# Patient Record
Sex: FEMALE | Race: WHITE | Employment: OTHER | URBAN - NONMETROPOLITAN AREA
[De-identification: names, ages, dates, MRNs, and addresses within clinical notes are randomized per-mention and may not be internally consistent; named-entity substitution may affect disease eponyms.]

---

## 2017-01-16 DIAGNOSIS — E78.5 HYPERLIPIDEMIA, UNSPECIFIED HYPERLIPIDEMIA TYPE: ICD-10-CM

## 2017-01-16 DIAGNOSIS — I11.0 HYPERTENSIVE HEART DISEASE WITH HEART FAILURE (HCC): ICD-10-CM

## 2017-01-16 DIAGNOSIS — I15.0 RENOVASCULAR HYPERTENSION: ICD-10-CM

## 2017-01-16 DIAGNOSIS — I10 ESSENTIAL HYPERTENSION: Primary | ICD-10-CM

## 2017-01-17 ENCOUNTER — TELEPHONE (OUTPATIENT)
Dept: NEPHROLOGY | Age: 82
End: 2017-01-17

## 2017-01-17 RX ORDER — LISINOPRIL 5 MG/1
2.5 TABLET ORAL DAILY
Qty: 30 TABLET | Refills: 1 | Status: SHIPPED | OUTPATIENT
Start: 2017-01-17 | End: 2018-06-25 | Stop reason: ALTCHOICE

## 2017-01-19 PROBLEM — E86.0 SEVERE DEHYDRATION: Status: ACTIVE | Noted: 2017-01-19

## 2017-01-19 PROBLEM — K59.01 CONSTIPATION BY DELAYED COLONIC TRANSIT: Status: ACTIVE | Noted: 2017-01-19

## 2017-01-19 PROBLEM — E87.3 METABOLIC ALKALOSIS: Status: ACTIVE | Noted: 2017-01-19

## 2017-01-19 PROBLEM — E11.65 DIABETES MELLITUS WITH HYPERGLYCEMIA (HCC): Status: ACTIVE | Noted: 2017-01-19

## 2017-01-24 ENCOUNTER — OFFICE VISIT (OUTPATIENT)
Dept: NEPHROLOGY | Age: 82
End: 2017-01-24

## 2017-01-24 VITALS — HEART RATE: 80 BPM | SYSTOLIC BLOOD PRESSURE: 152 MMHG | RESPIRATION RATE: 16 BRPM | DIASTOLIC BLOOD PRESSURE: 74 MMHG

## 2017-01-24 DIAGNOSIS — I10 ESSENTIAL HYPERTENSION: ICD-10-CM

## 2017-01-24 DIAGNOSIS — N18.30 CKD (CHRONIC KIDNEY DISEASE) STAGE 3, GFR 30-59 ML/MIN (HCC): Primary | ICD-10-CM

## 2017-01-24 DIAGNOSIS — E11.22 TYPE 2 DIABETES MELLITUS WITH STAGE 3 CHRONIC KIDNEY DISEASE, UNSPECIFIED LONG TERM INSULIN USE STATUS: ICD-10-CM

## 2017-01-24 DIAGNOSIS — M15.9 PRIMARY OSTEOARTHRITIS INVOLVING MULTIPLE JOINTS: ICD-10-CM

## 2017-01-24 DIAGNOSIS — N18.3 TYPE 2 DIABETES MELLITUS WITH STAGE 3 CHRONIC KIDNEY DISEASE, UNSPECIFIED LONG TERM INSULIN USE STATUS: ICD-10-CM

## 2017-01-24 PROCEDURE — G8400 PT W/DXA NO RESULTS DOC: HCPCS | Performed by: INTERNAL MEDICINE

## 2017-01-24 PROCEDURE — 1090F PRES/ABSN URINE INCON ASSESS: CPT | Performed by: INTERNAL MEDICINE

## 2017-01-24 PROCEDURE — 1123F ACP DISCUSS/DSCN MKR DOCD: CPT | Performed by: INTERNAL MEDICINE

## 2017-01-24 PROCEDURE — 99213 OFFICE O/P EST LOW 20 MIN: CPT | Performed by: INTERNAL MEDICINE

## 2017-01-24 PROCEDURE — 4040F PNEUMOC VAC/ADMIN/RCVD: CPT | Performed by: INTERNAL MEDICINE

## 2017-01-24 PROCEDURE — G8427 DOCREV CUR MEDS BY ELIG CLIN: HCPCS | Performed by: INTERNAL MEDICINE

## 2017-01-24 PROCEDURE — G8598 ASA/ANTIPLAT THER USED: HCPCS | Performed by: INTERNAL MEDICINE

## 2017-01-24 PROCEDURE — 1111F DSCHRG MED/CURRENT MED MERGE: CPT | Performed by: INTERNAL MEDICINE

## 2017-01-24 PROCEDURE — G8482 FLU IMMUNIZE ORDER/ADMIN: HCPCS | Performed by: INTERNAL MEDICINE

## 2017-01-24 PROCEDURE — G8419 CALC BMI OUT NRM PARAM NOF/U: HCPCS | Performed by: INTERNAL MEDICINE

## 2017-01-24 PROCEDURE — 1036F TOBACCO NON-USER: CPT | Performed by: INTERNAL MEDICINE

## 2017-02-01 ENCOUNTER — TELEPHONE (OUTPATIENT)
Dept: PULMONOLOGY | Age: 82
End: 2017-02-01

## 2017-02-16 ENCOUNTER — OFFICE VISIT (OUTPATIENT)
Dept: CARDIOLOGY | Age: 82
End: 2017-02-16

## 2017-02-16 VITALS
SYSTOLIC BLOOD PRESSURE: 164 MMHG | BODY MASS INDEX: 32.61 KG/M2 | DIASTOLIC BLOOD PRESSURE: 78 MMHG | HEART RATE: 72 BPM | HEIGHT: 64 IN | WEIGHT: 191 LBS

## 2017-02-16 DIAGNOSIS — I10 UNCONTROLLED HYPERTENSION: Primary | ICD-10-CM

## 2017-02-16 DIAGNOSIS — Z95.5 PRESENCE OF STENT IN RIGHT CORONARY ARTERY: ICD-10-CM

## 2017-02-16 DIAGNOSIS — I10 ESSENTIAL HYPERTENSION: ICD-10-CM

## 2017-02-16 DIAGNOSIS — I11.0 HYPERTENSIVE HEART DISEASE WITH HEART FAILURE (HCC): ICD-10-CM

## 2017-02-16 DIAGNOSIS — Z95.5 PRESENCE OF STENT IN LEFT CIRCUMFLEX CORONARY ARTERY: ICD-10-CM

## 2017-02-16 DIAGNOSIS — E78.5 DYSLIPIDEMIA: ICD-10-CM

## 2017-02-16 DIAGNOSIS — I25.10 CORONARY ARTERY DISEASE INVOLVING NATIVE CORONARY ARTERY OF NATIVE HEART WITHOUT ANGINA PECTORIS: ICD-10-CM

## 2017-02-16 DIAGNOSIS — N18.30 CKD (CHRONIC KIDNEY DISEASE) STAGE 3, GFR 30-59 ML/MIN (HCC): ICD-10-CM

## 2017-02-16 PROCEDURE — 4040F PNEUMOC VAC/ADMIN/RCVD: CPT | Performed by: INTERNAL MEDICINE

## 2017-02-16 PROCEDURE — 99213 OFFICE O/P EST LOW 20 MIN: CPT | Performed by: INTERNAL MEDICINE

## 2017-02-16 PROCEDURE — 1090F PRES/ABSN URINE INCON ASSESS: CPT | Performed by: INTERNAL MEDICINE

## 2017-02-16 PROCEDURE — G8427 DOCREV CUR MEDS BY ELIG CLIN: HCPCS | Performed by: INTERNAL MEDICINE

## 2017-02-16 PROCEDURE — G8400 PT W/DXA NO RESULTS DOC: HCPCS | Performed by: INTERNAL MEDICINE

## 2017-02-16 PROCEDURE — 1036F TOBACCO NON-USER: CPT | Performed by: INTERNAL MEDICINE

## 2017-02-16 PROCEDURE — G8598 ASA/ANTIPLAT THER USED: HCPCS | Performed by: INTERNAL MEDICINE

## 2017-02-16 PROCEDURE — 1123F ACP DISCUSS/DSCN MKR DOCD: CPT | Performed by: INTERNAL MEDICINE

## 2017-02-16 PROCEDURE — G8482 FLU IMMUNIZE ORDER/ADMIN: HCPCS | Performed by: INTERNAL MEDICINE

## 2017-02-16 PROCEDURE — G8419 CALC BMI OUT NRM PARAM NOF/U: HCPCS | Performed by: INTERNAL MEDICINE

## 2017-02-16 RX ORDER — ISOSORBIDE MONONITRATE 60 MG/1
60 TABLET, EXTENDED RELEASE ORAL DAILY
Qty: 30 TABLET | Refills: 3 | Status: SHIPPED | OUTPATIENT
Start: 2017-02-16 | End: 2017-06-12 | Stop reason: ALTCHOICE

## 2017-02-16 RX ORDER — METOPROLOL SUCCINATE 100 MG/1
100 TABLET, EXTENDED RELEASE ORAL DAILY
Qty: 30 TABLET | Refills: 3 | Status: SHIPPED | OUTPATIENT
Start: 2017-02-16 | End: 2017-12-18 | Stop reason: DRUGHIGH

## 2017-02-16 RX ORDER — FUROSEMIDE 20 MG/1
20 TABLET ORAL EVERY OTHER DAY
COMMUNITY
End: 2018-06-19 | Stop reason: SDUPTHER

## 2017-02-16 RX ORDER — ISOSORBIDE MONONITRATE 30 MG/1
30 TABLET, EXTENDED RELEASE ORAL 2 TIMES DAILY
COMMUNITY
End: 2017-02-16

## 2017-02-16 RX ORDER — OXYBUTYNIN CHLORIDE 5 MG/1
5 TABLET ORAL 2 TIMES DAILY
COMMUNITY

## 2017-02-17 ENCOUNTER — TELEPHONE (OUTPATIENT)
Dept: NEPHROLOGY | Age: 82
End: 2017-02-17

## 2017-02-28 ENCOUNTER — TELEPHONE (OUTPATIENT)
Dept: NEPHROLOGY | Age: 82
End: 2017-02-28

## 2017-03-01 ENCOUNTER — TELEPHONE (OUTPATIENT)
Dept: CARDIOLOGY | Age: 82
End: 2017-03-01

## 2017-05-23 LAB
ANION GAP SERPL CALCULATED.3IONS-SCNC: 10 MMOL/L
BUN BLDV-MCNC: 56 MG/DL (ref 9–24)
CALCIUM SERPL-MCNC: 9.1 MG/DL (ref 8.7–10.8)
CHLORIDE BLD-SCNC: 108 MMOL/L (ref 95–111)
CO2: 24 MMOL/L (ref 21–32)
CREAT SERPL-MCNC: 1.9 MG/DL (ref 0.5–1.3)
EGFR AFRICAN AMERICAN: 30
EGFR IF NONAFRICAN AMERICAN: 25
GLUCOSE: 159 MG/DL (ref 70–100)
HCT VFR BLD CALC: 29.6 % (ref 36–48)
HCT, POC: 29.6
HEMOGLOBIN: 9.9 G/DL (ref 12–16)
HGB, POC: 9.9
PHOSPHORUS: 3.6 MG/DL (ref 2.5–4.7)
POTASSIUM SERPL-SCNC: 4.3 MMOL/L (ref 3.5–5.4)
PTH INTACT: 64
SODIUM BLD-SCNC: 138 MMOL/L (ref 134–147)
VITAMIN D 25-HYDROXY: 34
VITAMIN D2, 25 HYDROXY: NORMAL
VITAMIN D3,25 HYDROXY: NORMAL

## 2017-05-24 LAB
PARATHYROID HORMONE INTACT: 64 PG/ML (ref 11–67)
VITAMIN D 25-HYDROXY: 34 NG/ML

## 2017-06-12 ENCOUNTER — OFFICE VISIT (OUTPATIENT)
Dept: NEPHROLOGY | Age: 82
End: 2017-06-12

## 2017-06-12 VITALS
SYSTOLIC BLOOD PRESSURE: 160 MMHG | DIASTOLIC BLOOD PRESSURE: 72 MMHG | RESPIRATION RATE: 20 BRPM | BODY MASS INDEX: 32.61 KG/M2 | HEART RATE: 60 BPM | WEIGHT: 190 LBS

## 2017-06-12 DIAGNOSIS — N18.4 DIABETES MELLITUS DUE TO UNDERLYING CONDITION WITH STAGE 4 CHRONIC KIDNEY DISEASE, UNSPECIFIED LONG TERM INSULIN USE STATUS: ICD-10-CM

## 2017-06-12 DIAGNOSIS — I10 ESSENTIAL HYPERTENSION: ICD-10-CM

## 2017-06-12 DIAGNOSIS — M15.9 PRIMARY OSTEOARTHRITIS INVOLVING MULTIPLE JOINTS: ICD-10-CM

## 2017-06-12 DIAGNOSIS — E08.22 DIABETES MELLITUS DUE TO UNDERLYING CONDITION WITH STAGE 4 CHRONIC KIDNEY DISEASE, UNSPECIFIED LONG TERM INSULIN USE STATUS: ICD-10-CM

## 2017-06-12 DIAGNOSIS — N18.4 CKD (CHRONIC KIDNEY DISEASE), STAGE 4 (SEVERE): Primary | ICD-10-CM

## 2017-06-12 DIAGNOSIS — N18.4 CHRONIC KIDNEY DISEASE (CKD), STAGE IV (SEVERE) (HCC): ICD-10-CM

## 2017-06-12 PROCEDURE — G8598 ASA/ANTIPLAT THER USED: HCPCS | Performed by: INTERNAL MEDICINE

## 2017-06-12 PROCEDURE — 1090F PRES/ABSN URINE INCON ASSESS: CPT | Performed by: INTERNAL MEDICINE

## 2017-06-12 PROCEDURE — 99214 OFFICE O/P EST MOD 30 MIN: CPT | Performed by: INTERNAL MEDICINE

## 2017-06-12 PROCEDURE — 1123F ACP DISCUSS/DSCN MKR DOCD: CPT | Performed by: INTERNAL MEDICINE

## 2017-06-12 PROCEDURE — 1036F TOBACCO NON-USER: CPT | Performed by: INTERNAL MEDICINE

## 2017-06-12 PROCEDURE — G8427 DOCREV CUR MEDS BY ELIG CLIN: HCPCS | Performed by: INTERNAL MEDICINE

## 2017-06-12 PROCEDURE — 4040F PNEUMOC VAC/ADMIN/RCVD: CPT | Performed by: INTERNAL MEDICINE

## 2017-06-12 PROCEDURE — G8417 CALC BMI ABV UP PARAM F/U: HCPCS | Performed by: INTERNAL MEDICINE

## 2017-06-12 RX ORDER — LOSARTAN POTASSIUM 100 MG/1
100 TABLET ORAL DAILY
Qty: 90 TABLET | Refills: 3 | Status: SHIPPED | OUTPATIENT
Start: 2017-06-12 | End: 2018-07-09 | Stop reason: SDUPTHER

## 2017-12-15 LAB
ANION GAP SERPL CALCULATED.3IONS-SCNC: 15 MMOL/L
BUN BLDV-MCNC: 64 MG/DL (ref 9–24)
CALCIUM SERPL-MCNC: 9.4 MG/DL (ref 8.7–10.8)
CHLORIDE BLD-SCNC: 103 MMOL/L (ref 95–111)
CO2: 27 MMOL/L (ref 21–32)
CREAT SERPL-MCNC: 2.5 MG/DL (ref 0.5–1.3)
EGFR AFRICAN AMERICAN: 22
EGFR IF NONAFRICAN AMERICAN: 18
GLUCOSE: 113 MG/DL (ref 70–100)
PHOSPHORUS: 4.1 MG/DL (ref 2.5–4.7)
POTASSIUM SERPL-SCNC: 4.4 MMOL/L (ref 3.5–5.4)
SODIUM BLD-SCNC: 141 MMOL/L (ref 134–147)

## 2017-12-18 ENCOUNTER — OFFICE VISIT (OUTPATIENT)
Dept: NEPHROLOGY | Age: 82
End: 2017-12-18
Payer: MEDICARE

## 2017-12-18 VITALS — WEIGHT: 194 LBS | BODY MASS INDEX: 33.3 KG/M2 | DIASTOLIC BLOOD PRESSURE: 80 MMHG | SYSTOLIC BLOOD PRESSURE: 176 MMHG

## 2017-12-18 DIAGNOSIS — N18.4 TYPE 2 DIABETES MELLITUS WITH STAGE 4 CHRONIC KIDNEY DISEASE, UNSPECIFIED LONG TERM INSULIN USE STATUS: ICD-10-CM

## 2017-12-18 DIAGNOSIS — D63.8 ANEMIA OF CHRONIC DISEASE: ICD-10-CM

## 2017-12-18 DIAGNOSIS — N18.4 STAGE 4 CHRONIC KIDNEY DISEASE (HCC): Primary | ICD-10-CM

## 2017-12-18 DIAGNOSIS — N17.9 AKI (ACUTE KIDNEY INJURY) (HCC): ICD-10-CM

## 2017-12-18 DIAGNOSIS — E11.22 TYPE 2 DIABETES MELLITUS WITH STAGE 4 CHRONIC KIDNEY DISEASE, UNSPECIFIED LONG TERM INSULIN USE STATUS: ICD-10-CM

## 2017-12-18 DIAGNOSIS — I10 ESSENTIAL HYPERTENSION: ICD-10-CM

## 2017-12-18 LAB
BUN BLDV-MCNC: 64 MG/DL
CALCIUM SERPL-MCNC: 9.4 MG/DL
CHLORIDE BLD-SCNC: 103 MMOL/L
CO2: 27 MMOL/L
CREAT SERPL-MCNC: 2.5 MG/DL
GFR CALCULATED: 18
GLUCOSE BLD-MCNC: 113 MG/DL
PHOSPHORUS: 4.1 MG/DL
POTASSIUM SERPL-SCNC: 4.4 MMOL/L
SODIUM BLD-SCNC: 141 MMOL/L

## 2017-12-18 PROCEDURE — 99214 OFFICE O/P EST MOD 30 MIN: CPT | Performed by: INTERNAL MEDICINE

## 2017-12-18 PROCEDURE — G8427 DOCREV CUR MEDS BY ELIG CLIN: HCPCS | Performed by: INTERNAL MEDICINE

## 2017-12-18 PROCEDURE — 1090F PRES/ABSN URINE INCON ASSESS: CPT | Performed by: INTERNAL MEDICINE

## 2017-12-18 PROCEDURE — 1036F TOBACCO NON-USER: CPT | Performed by: INTERNAL MEDICINE

## 2017-12-18 PROCEDURE — G8417 CALC BMI ABV UP PARAM F/U: HCPCS | Performed by: INTERNAL MEDICINE

## 2017-12-18 PROCEDURE — 1123F ACP DISCUSS/DSCN MKR DOCD: CPT | Performed by: INTERNAL MEDICINE

## 2017-12-18 PROCEDURE — G8598 ASA/ANTIPLAT THER USED: HCPCS | Performed by: INTERNAL MEDICINE

## 2017-12-18 PROCEDURE — G8484 FLU IMMUNIZE NO ADMIN: HCPCS | Performed by: INTERNAL MEDICINE

## 2017-12-18 PROCEDURE — 4040F PNEUMOC VAC/ADMIN/RCVD: CPT | Performed by: INTERNAL MEDICINE

## 2017-12-18 RX ORDER — ISOSORBIDE MONONITRATE 120 MG/1
60 TABLET, EXTENDED RELEASE ORAL DAILY
COMMUNITY
End: 2018-01-01 | Stop reason: SDUPTHER

## 2017-12-18 RX ORDER — METOPROLOL SUCCINATE 50 MG/1
50 TABLET, EXTENDED RELEASE ORAL DAILY
COMMUNITY

## 2017-12-18 NOTE — PROGRESS NOTES
disease involving native coronary artery of native heart without angina pectoris    Acute diastolic congestive heart failure (HCC)    Accelerated hypertension    Pneumonia of both lungs due to infectious organism    Wheezing    Interstitial nephritis, acute    Acidosis    Anemia of chronic kidney failure    Pneumonia of both lower lobes due to infectious organism    Hypervolemia    Constipation by delayed colonic transit    Metabolic alkalosis    Severe dehydration    Diabetes mellitus with hyperglycemia (HCC)    TURNER (acute kidney injury) ([facility] [address]    Anemia of chronic disease       Subjective:   Chief complaint:  Chief Complaint   Patient presents with    Chronic Kidney Disease     Stage 4      HPI:This is a follow up visit for Mrs. Lexis Abdullahi who is here today for return appointment. I see her for chronic kidney disease. She was last seen about 6 months ago. Serum creatinine was 1.9 mg/dL. Today it is 2.5 mg/dL. BUN was 56 mg/dL. Today it is 64 mg/dL. Since last time I saw her, she had been diagnosed with polymyalgia rheumatica. She had been prescribed a prednisone. She was also prescribed methotrexate and folic acid but she has not started those. About 3-4 months ago she was started on the furosemide 20 mg 1-2 tablets as needed for weight gain of 3 pounds or more. Sometimes she had taken up to 3 tablets. She does have  chronic joint ache and joint pain. She uses walker to ambulate around.   She has chronic lower extremity edema    ROS:Constitutional: negative  Eyes: negative  Ears, nose, mouth, throat, and face: negative  Respiratory: negative  Cardiovascular: positive for lower extremity edema  Gastrointestinal: negative  Genitourinary:negative  Integument/breast: negative  Hematologic/lymphatic: negative  Musculoskeletal:positive for arthralgias and stiff joints  Neurological: positive for coordination problems and gait problems  Behavioral/Psych: negative  Endocrine: negative  Allergic/Immunologic: negative  Medications:     Current Outpatient Prescriptions   Medication Sig Dispense Refill    Multiple Vitamins-Minerals (VISION FORMULA PO) Take by mouth daily      losartan (COZAAR) 100 MG tablet Take 1 tablet by mouth daily 90 tablet 3    furosemide (LASIX) 20 MG tablet Take 20 mg by mouth every other day      oxybutynin (DITROPAN) 5 MG tablet Take 5 mg by mouth 2 times daily      famotidine (PEPCID) 20 MG tablet Take 20 mg by mouth daily as needed      lisinopril (PRINIVIL;ZESTRIL) 5 MG tablet Take 0.5 tablets by mouth daily (Patient taking differently: Take 40 mg by mouth daily ) 30 tablet 1    hydrALAZINE (APRESOLINE) 100 MG tablet Take 1 tablet by mouth 3 times daily 90 tablet 3    LUTEIN-ZEAXANTHIN PO Take by mouth      clopidogrel (PLAVIX) 75 MG tablet Take 1 tablet by mouth daily 90 tablet 3    atorvastatin (LIPITOR) 40 MG tablet Take 1 tablet by mouth daily 90 tablet 3    glipiZIDE (GLUCOTROL) 5 MG tablet Take 5 mg by mouth daily      levothyroxine (SYNTHROID) 125 MCG tablet Take 125 mcg by mouth Daily.  aspirin 81 MG tablet Take 81 mg by mouth daily. No current facility-administered medications for this visit.         Lab Results:    CBC:   Lab Results   Component Value Date    WBC 6.3 04/30/2017    HGB 9.9 05/23/2017    HCT 29.6 05/23/2017    MCV 96.0 04/30/2017     04/30/2017     BMP:    Lab Results   Component Value Date     12/18/2017     05/22/2017     04/30/2017    K 4.4 12/18/2017    K 4.3 05/22/2017    K 4.7 04/30/2017     12/18/2017     05/22/2017     04/30/2017    CO2 27 12/18/2017    CO2 24 05/22/2017    CO2 23 04/30/2017    BUN 64 12/18/2017    BUN 56 (H) 05/22/2017    BUN 64 (H) 04/30/2017    CREATININE 2.5 12/18/2017    CREATININE 1.9 (H) 05/22/2017    CREATININE 2.3 (H) 04/30/2017    GLUCOSE 113 12/18/2017    GLUCOSE 159 (H) 05/22/2017    GLUCOSE 79 04/30/2017      Hepatic:   Lab Results

## 2017-12-19 LAB
PARATHYROID HORMONE INTACT: 6 PG/ML (ref 11–67)
VITAMIN D 25-HYDROXY: 20 NG/ML

## 2017-12-20 ENCOUNTER — TELEPHONE (OUTPATIENT)
Dept: NEPHROLOGY | Age: 82
End: 2017-12-20

## 2018-01-01 ENCOUNTER — HOSPITAL ENCOUNTER (OUTPATIENT)
Age: 83
Discharge: HOME | End: 2018-12-10
Payer: MEDICARE

## 2018-01-01 ENCOUNTER — HOSPITAL ENCOUNTER (OUTPATIENT)
Dept: GENERAL RADIOLOGY | Age: 83
Discharge: HOME | End: 2018-12-10
Payer: MEDICARE

## 2018-01-01 ENCOUNTER — OFFICE VISIT (OUTPATIENT)
Dept: NEPHROLOGY | Age: 83
End: 2018-01-01
Payer: MEDICARE

## 2018-01-01 ENCOUNTER — TELEPHONE (OUTPATIENT)
Dept: ADMINISTRATIVE | Age: 83
End: 2018-01-01

## 2018-01-01 ENCOUNTER — HOSPITAL ENCOUNTER (OUTPATIENT)
Dept: NURSING | Age: 83
Discharge: HOME | End: 2018-11-26
Payer: MEDICARE

## 2018-01-01 VITALS
OXYGEN SATURATION: 96 % | BODY MASS INDEX: 34.4 KG/M2 | DIASTOLIC BLOOD PRESSURE: 67 MMHG | HEART RATE: 88 BPM | WEIGHT: 194.2 LBS | SYSTOLIC BLOOD PRESSURE: 147 MMHG

## 2018-01-01 VITALS
OXYGEN SATURATION: 98 % | SYSTOLIC BLOOD PRESSURE: 154 MMHG | TEMPERATURE: 96.9 F | RESPIRATION RATE: 18 BRPM | HEART RATE: 64 BPM | DIASTOLIC BLOOD PRESSURE: 70 MMHG

## 2018-01-01 DIAGNOSIS — M85.80 OSTEOPENIA, UNSPECIFIED LOCATION: ICD-10-CM

## 2018-01-01 DIAGNOSIS — Z87.39 H/O: GOUT: ICD-10-CM

## 2018-01-01 DIAGNOSIS — N18.4 STAGE 4 CHRONIC KIDNEY DISEASE (HCC): Primary | ICD-10-CM

## 2018-01-01 DIAGNOSIS — D63.8 ANEMIA OF CHRONIC DISEASE: ICD-10-CM

## 2018-01-01 DIAGNOSIS — N18.4 TYPE 2 DIABETES MELLITUS WITH STAGE 4 CHRONIC KIDNEY DISEASE, UNSPECIFIED WHETHER LONG TERM INSULIN USE (HCC): ICD-10-CM

## 2018-01-01 DIAGNOSIS — E11.22 TYPE 2 DIABETES MELLITUS WITH STAGE 4 CHRONIC KIDNEY DISEASE, UNSPECIFIED WHETHER LONG TERM INSULIN USE (HCC): ICD-10-CM

## 2018-01-01 DIAGNOSIS — M25.50 POLYARTHRALGIA: ICD-10-CM

## 2018-01-01 DIAGNOSIS — E55.9 VITAMIN D DEFICIENCY: ICD-10-CM

## 2018-01-01 DIAGNOSIS — I10 ESSENTIAL HYPERTENSION: ICD-10-CM

## 2018-01-01 LAB
ANION GAP SERPL CALCULATED.3IONS-SCNC: 13 MEQ/L (ref 10–19)
BUN BLDV-MCNC: 74 MG/DL (ref 8–23)
CALCIUM SERPL-MCNC: 9 MG/DL (ref 8.5–10.5)
CHLORIDE BLD-SCNC: 104 MEQ/L (ref 95–107)
CO2: 25 MEQ/L (ref 19–31)
CREAT SERPL-MCNC: 2.5 MG/DL (ref 0.6–1.3)
EGFR AFRICAN AMERICAN: 19.4 ML/MIN/1.73 M2
EGFR IF NONAFRICAN AMERICAN: 16.7 ML/MIN/1.73 M2
GLUCOSE: 175 MG/DL (ref 70–99)
PARATHYROID HORMONE INTACT: 190 PG/ML (ref 11–67)
PHOSPHORUS: 3 MG/DL (ref 2.5–4.5)
POTASSIUM SERPL-SCNC: 4.9 MEQ/L (ref 3.5–5.4)
SODIUM BLD-SCNC: 142 MEQ/L (ref 135–146)
VITAMIN D 25-HYDROXY: 50 NG/ML

## 2018-01-01 PROCEDURE — 4040F PNEUMOC VAC/ADMIN/RCVD: CPT | Performed by: INTERNAL MEDICINE

## 2018-01-01 PROCEDURE — G8417 CALC BMI ABV UP PARAM F/U: HCPCS | Performed by: INTERNAL MEDICINE

## 2018-01-01 PROCEDURE — 1123F ACP DISCUSS/DSCN MKR DOCD: CPT | Performed by: INTERNAL MEDICINE

## 2018-01-01 PROCEDURE — 1090F PRES/ABSN URINE INCON ASSESS: CPT | Performed by: INTERNAL MEDICINE

## 2018-01-01 PROCEDURE — 1036F TOBACCO NON-USER: CPT | Performed by: INTERNAL MEDICINE

## 2018-01-01 PROCEDURE — 99213 OFFICE O/P EST LOW 20 MIN: CPT | Performed by: INTERNAL MEDICINE

## 2018-01-01 PROCEDURE — G8598 ASA/ANTIPLAT THER USED: HCPCS | Performed by: INTERNAL MEDICINE

## 2018-01-01 PROCEDURE — 6360000002 HC RX W HCPCS: Performed by: INTERNAL MEDICINE

## 2018-01-01 PROCEDURE — 96372 THER/PROPH/DIAG INJ SC/IM: CPT

## 2018-01-01 PROCEDURE — G8484 FLU IMMUNIZE NO ADMIN: HCPCS | Performed by: INTERNAL MEDICINE

## 2018-01-01 PROCEDURE — G8427 DOCREV CUR MEDS BY ELIG CLIN: HCPCS | Performed by: INTERNAL MEDICINE

## 2018-01-01 PROCEDURE — 73630 X-RAY EXAM OF FOOT: CPT

## 2018-01-01 PROCEDURE — 1101F PT FALLS ASSESS-DOCD LE1/YR: CPT | Performed by: INTERNAL MEDICINE

## 2018-01-01 RX ORDER — DIPHENHYDRAMINE HYDROCHLORIDE 50 MG/ML
50 INJECTION INTRAMUSCULAR; INTRAVENOUS ONCE
Status: CANCELLED | OUTPATIENT
Start: 2018-01-01 | End: 2018-01-01

## 2018-01-01 RX ORDER — SODIUM CHLORIDE 9 MG/ML
100 INJECTION, SOLUTION INTRAVENOUS CONTINUOUS
Status: CANCELLED | OUTPATIENT
Start: 2018-01-01

## 2018-01-01 RX ORDER — 0.9 % SODIUM CHLORIDE 0.9 %
10 VIAL (ML) INJECTION ONCE
Status: CANCELLED | OUTPATIENT
Start: 2018-01-01 | End: 2018-01-01

## 2018-01-01 RX ORDER — METHYLPREDNISOLONE SODIUM SUCCINATE 125 MG/2ML
125 INJECTION, POWDER, LYOPHILIZED, FOR SOLUTION INTRAMUSCULAR; INTRAVENOUS ONCE
Status: CANCELLED | OUTPATIENT
Start: 2018-01-01 | End: 2018-01-01

## 2018-01-01 RX ORDER — ISOSORBIDE MONONITRATE 60 MG/1
60 TABLET, EXTENDED RELEASE ORAL DAILY
COMMUNITY

## 2018-01-01 RX ADMIN — DENOSUMAB 60 MG: 60 INJECTION SUBCUTANEOUS at 14:44

## 2018-02-27 ENCOUNTER — HOSPITAL ENCOUNTER (OUTPATIENT)
Dept: GENERAL RADIOLOGY | Age: 83
Discharge: HOME | End: 2018-02-27
Payer: MEDICARE

## 2018-02-27 ENCOUNTER — HOSPITAL ENCOUNTER (OUTPATIENT)
Age: 83
Discharge: HOME | End: 2018-02-27
Payer: MEDICARE

## 2018-02-27 ENCOUNTER — OFFICE VISIT (OUTPATIENT)
Dept: RHEUMATOLOGY | Age: 83
End: 2018-02-27
Payer: MEDICARE

## 2018-02-27 VITALS
DIASTOLIC BLOOD PRESSURE: 80 MMHG | HEIGHT: 64 IN | SYSTOLIC BLOOD PRESSURE: 188 MMHG | HEART RATE: 97 BPM | BODY MASS INDEX: 32.27 KG/M2 | RESPIRATION RATE: 14 BRPM | WEIGHT: 189 LBS

## 2018-02-27 DIAGNOSIS — M17.0 OSTEOARTHRITIS OF BOTH KNEES, UNSPECIFIED OSTEOARTHRITIS TYPE: ICD-10-CM

## 2018-02-27 DIAGNOSIS — M19.042 OSTEOARTHRITIS OF BOTH HANDS, UNSPECIFIED OSTEOARTHRITIS TYPE: ICD-10-CM

## 2018-02-27 DIAGNOSIS — M54.41 CHRONIC MIDLINE LOW BACK PAIN WITH BILATERAL SCIATICA: ICD-10-CM

## 2018-02-27 DIAGNOSIS — M25.50 POLYARTHRALGIA: Primary | ICD-10-CM

## 2018-02-27 DIAGNOSIS — M54.2 CERVICALGIA: ICD-10-CM

## 2018-02-27 DIAGNOSIS — R53.83 FATIGUE, UNSPECIFIED TYPE: ICD-10-CM

## 2018-02-27 DIAGNOSIS — G89.29 CHRONIC MIDLINE LOW BACK PAIN WITH BILATERAL SCIATICA: ICD-10-CM

## 2018-02-27 DIAGNOSIS — M25.50 POLYARTHRALGIA: ICD-10-CM

## 2018-02-27 DIAGNOSIS — M19.041 OSTEOARTHRITIS OF BOTH HANDS, UNSPECIFIED OSTEOARTHRITIS TYPE: ICD-10-CM

## 2018-02-27 DIAGNOSIS — M54.42 CHRONIC MIDLINE LOW BACK PAIN WITH BILATERAL SCIATICA: ICD-10-CM

## 2018-02-27 DIAGNOSIS — Z78.0 POST-MENOPAUSAL: ICD-10-CM

## 2018-02-27 LAB
ALBUMIN SERPL-MCNC: 4.4 G/DL
ALP BLD-CCNC: 105 U/L
ALT SERPL-CCNC: 10 U/L
ANION GAP SERPL CALCULATED.3IONS-SCNC: 19 MMOL/L
AST SERPL-CCNC: 17 U/L
BASOPHILS ABSOLUTE: 0.1 /ΜL
BASOPHILS RELATIVE PERCENT: 1.2 %
BILIRUB SERPL-MCNC: 0.6 MG/DL (ref 0.1–1.4)
BUN BLDV-MCNC: 73 MG/DL
C-REACTIVE PROTEIN: 0.26
CALCIUM SERPL-MCNC: 10 MG/DL
CHLORIDE BLD-SCNC: 103 MMOL/L
CO2: 24 MMOL/L
CREAT SERPL-MCNC: 2.3 MG/DL
EOSINOPHILS ABSOLUTE: 0.3 /ΜL
EOSINOPHILS RELATIVE PERCENT: 3.8 %
FERRITIN: 57.01 NG/ML (ref 9–150)
GFR CALCULATED: 18.5
GLUCOSE BLD-MCNC: 126 MG/DL
HCT VFR BLD CALC: 31.4 % (ref 36–46)
HEMOGLOBIN: 10.7 G/DL (ref 12–16)
LYMPHOCYTES ABSOLUTE: 0.6 /ΜL
LYMPHOCYTES RELATIVE PERCENT: 7.8 %
MAGNESIUM: 2 MG/DL
MCH RBC QN AUTO: 32.9 PG
MCHC RBC AUTO-ENTMCNC: 34.1 G/DL
MCV RBC AUTO: 96.6 FL
MONOCYTES ABSOLUTE: 0.8 /ΜL
MONOCYTES RELATIVE PERCENT: 9.5 %
NEUTROPHILS ABSOLUTE: 6.2 /ΜL
NEUTROPHILS RELATIVE PERCENT: 77.2 %
PDW BLD-RTO: 14.4 %
PHOSPHORUS: 4.9 MG/DL
PLATELET # BLD: 227 K/ΜL
PMV BLD AUTO: ABNORMAL FL
POTASSIUM SERPL-SCNC: 4.8 MMOL/L
RBC # BLD: 3.25 10^6/ΜL
RHEUMATOID FACTOR: <10
SEDIMENTATION RATE, ERYTHROCYTE: 45
SODIUM BLD-SCNC: 146 MMOL/L
TOTAL PROTEIN: 6.5
URIC ACID: 10.5
WBC # BLD: 8.1 10^3/ML

## 2018-02-27 PROCEDURE — G8417 CALC BMI ABV UP PARAM F/U: HCPCS | Performed by: INTERNAL MEDICINE

## 2018-02-27 PROCEDURE — 1090F PRES/ABSN URINE INCON ASSESS: CPT | Performed by: INTERNAL MEDICINE

## 2018-02-27 PROCEDURE — 71046 X-RAY EXAM CHEST 2 VIEWS: CPT

## 2018-02-27 PROCEDURE — G8427 DOCREV CUR MEDS BY ELIG CLIN: HCPCS | Performed by: INTERNAL MEDICINE

## 2018-02-27 PROCEDURE — 73130 X-RAY EXAM OF HAND: CPT

## 2018-02-27 PROCEDURE — 99204 OFFICE O/P NEW MOD 45 MIN: CPT | Performed by: INTERNAL MEDICINE

## 2018-02-27 PROCEDURE — 4040F PNEUMOC VAC/ADMIN/RCVD: CPT | Performed by: INTERNAL MEDICINE

## 2018-02-27 PROCEDURE — G8484 FLU IMMUNIZE NO ADMIN: HCPCS | Performed by: INTERNAL MEDICINE

## 2018-02-27 ASSESSMENT — JOINT PAIN
TOTAL NUMBER OF SWOLLEN JOINTS: 10
TOTAL NUMBER OF TENDER JOINTS: 18

## 2018-02-27 ASSESSMENT — ENCOUNTER SYMPTOMS
EYES NEGATIVE: 1
CONSTIPATION: 1
BACK PAIN: 1
DIARRHEA: 1
RESPIRATORY NEGATIVE: 1
BLOOD IN STOOL: 0
ORTHOPNEA: 0

## 2018-02-27 NOTE — PROGRESS NOTES
Codeine #3 [Acetaminophen-Codeine]     Ultram [Tramadol] Other (See Comments)     dizziness    Vioxx [Rofecoxib] Other (See Comments)     dizziness    Advil [Ibuprofen] Nausea And Vomiting and Other (See Comments)     Was told never to take    Bumex [Bumetanide] Nausea And Vomiting    Codeine Other (See Comments)     Inability to focus, glassed over eyes patient states    Darvocet A500 [Propoxyphene N-Acetaminophen] Nausea And Vomiting and Other (See Comments)     dizziness    Darvon [Fd&C Red #40-Fd&C Yellow #10-Propoxyphene] Nausea And Vomiting and Other (See Comments)     dizziness       CURRENT MEDICATIONS  Current Outpatient Prescriptions   Medication Sig Dispense Refill    Cholecalciferol (VITAMIN D3) 5000 units CAPS Take 1 capsule by mouth daily      isosorbide mononitrate (IMDUR) 60 MG extended release tablet Take 60 mg by mouth daily      metoprolol succinate (TOPROL XL) 50 MG extended release tablet Take 50 mg by mouth daily      Multiple Vitamins-Minerals (VISION FORMULA PO) Take by mouth daily      losartan (COZAAR) 100 MG tablet Take 1 tablet by mouth daily 90 tablet 3    furosemide (LASIX) 20 MG tablet Take 20 mg by mouth every other day      oxybutynin (DITROPAN) 5 MG tablet Take 5 mg by mouth 2 times daily      famotidine (PEPCID) 20 MG tablet Take 20 mg by mouth daily as needed      lisinopril (PRINIVIL;ZESTRIL) 5 MG tablet Take 0.5 tablets by mouth daily (Patient taking differently: Take 40 mg by mouth daily ) 30 tablet 1    hydrALAZINE (APRESOLINE) 100 MG tablet Take 1 tablet by mouth 3 times daily 90 tablet 3    LUTEIN-ZEAXANTHIN PO Take by mouth      clopidogrel (PLAVIX) 75 MG tablet Take 1 tablet by mouth daily 90 tablet 3    glipiZIDE (GLUCOTROL) 5 MG tablet Take 5 mg by mouth daily      levothyroxine (SYNTHROID) 125 MCG tablet Take 125 mcg by mouth Daily.  aspirin 81 MG tablet Take 81 mg by mouth daily.        No current facility-administered medications for this and wrist  LUE: wrist  Right hand: 1st MCP, 2nd MCP, 5th MCP, 1st PIP, 2nd PIP, 3rd PIP, 4th PIP, 5th PIP, 2nd DIP, 3rd DIP, 4th DIP and 5th DIP  Left hand: 1st MCP, 2nd MCP, 3rd MCP, 1st PIP, 2nd PIP, 3rd PIP and 5th PIP    Swelling:   RUE: ulnohumeral and radiohumeral and wrist  LUE: wrist  Right hand: 2nd MCP, 5th MCP, 1st PIP, 2nd PIP and 5th PIP  Left hand: 1st PIP and 5th PIP    YOUNG-28 tender joint count: 18  YOUNG-28 swollen joint count: 10    RAPID3 Composite Score  MDHAQ (0-10):   Patient pain VAS (0-10):   Patient global assessment VAS (0-10):      RAPID3 Total Score:   Remission: <3  Low Disease Activity: <6  Moderate Disease Activity: >=6 and <=12  High Disease Activity: >12    LABS:  CBC  Lab Results   Component Value Date    WBC 6.3 04/30/2017    RBC 3.23 04/30/2017    RBC 3.48 01/13/2016    HGB 9.9 05/23/2017    HGB 9.9 05/22/2017    HCT 29.6 05/23/2017    HCT 29.6 05/22/2017    MCV 96.0 04/30/2017    MCH 33.0 04/30/2017    MCHC 34.3 04/30/2017    RDW 12.8 04/30/2017     04/30/2017       CMP  Lab Results   Component Value Date    CALCIUM 9.4 12/18/2017    LABALBU 3.5 04/30/2017    PROT 6.2 04/30/2017     12/18/2017    K 4.4 12/18/2017    CO2 27 12/18/2017     12/18/2017    BUN 64 12/18/2017    CREATININE 2.5 12/18/2017    ALT 7 04/30/2017    AST 18 04/30/2017       HgBA1c: No components found for: HGBA1C    Lab Results   Component Value Date    TSH 3.920 02/24/2016     Lab Results   Component Value Date    VITD25 20 12/14/2017         No results found for: ANASCRN  No results found for: SSA  No results found for: SSB  No results found for: ANTI-SMITH  No results found for: DSDNAAB   No results found for: ANTIRNP  No results found for: C3, C4  No results found for: CCPAB  No results found for: RF    No components found for: CANCASCRN, APANCASCRN  Lab Results   Component Value Date    SEDRATE 42 (H) 12/28/2016     No results found for: CRP    RADIOLOGY:       Assessment/ Plan:    Polyarthralgia:   - The patient reports joint pain in finger, wrist, elbows, Right shoulder, Bilateral hips, toes, neck and back. Most severe in the right shoulder and elbows. Symptoms started: the lower back with sciatica present for years,  No relief with surgery. Neck pain present for years diagnosed with moderate-severe cervical spinal stenosis in 2015. Achilles tendon rupture 2004 with surgical intervention and in 2005 or 06 she had hammer toe surgery on the right foot. ~ [id number]  the hands pain started with intermittent swelling. nodule development on the fingers over the past several years. The pain in the right shoulder, right elbows started over the past 6 months. Unable to straight the right elbow for 10 years and inability to make a fist. Difficulty with open bottle tops and jar tops, Denies difficulty dressing of feeding herself. Morning stiffness lasting 15-30 minutes, (+) joint swelling, dry mouth, itching eyes intermittent, weakness of legs with standing/walking improved with sitting.    - (+) synovitis in hands and Right elbows. [place] and shayla nodes with [facility]. Limited ROM of the right shoulder and right hand. (+) circumferential swelling of the right 5th Finger, right 1st toes, and 5th toe. Red rash along the anterior    -  Mother & sister with reported similar arthritic hands. Rheumatism in both mother and father.    - evaluation for gout, CPPD arthropathy. Pt with rash in the gluteal cleft, and significant deformities of the figners with synovitis and dactylitis noted on exam so ? PsA. Dactylitis and synovitis can be seen with sarcoidosis. Lower suspicion for PMR given the feet involvement. Will also evaluate for rheumatoid arthritis, SLE, Sjgorens (given Sicca Sx's)   - negative bilateral Temporal artery biopsy 3/12/15    - CBC Auto Differential; Future  - Comprehensive Metabolic Panel; Future  - Sedimentation Rate; Future  - C-Reactive Protein;  Future  - Hepatitis

## 2018-03-05 ENCOUNTER — TELEPHONE (OUTPATIENT)
Dept: RHEUMATOLOGY | Age: 83
End: 2018-03-05

## 2018-03-07 ENCOUNTER — HOSPITAL ENCOUNTER (OUTPATIENT)
Dept: WOMENS IMAGING | Age: 83
Discharge: HOME | End: 2018-03-07
Payer: MEDICARE

## 2018-03-07 DIAGNOSIS — Z78.0 POST-MENOPAUSAL: ICD-10-CM

## 2018-03-07 PROCEDURE — 77080 DXA BONE DENSITY AXIAL: CPT

## 2018-03-09 ENCOUNTER — TELEPHONE (OUTPATIENT)
Dept: RHEUMATOLOGY | Age: 83
End: 2018-03-09

## 2018-03-09 NOTE — TELEPHONE ENCOUNTER
Pt called but there was no answer. A message was left on the patients voicemail asking them to call back. Labs:   - pt with elevated uric acid and Erythrocyte Sedimentation Rate (ESR)   - DEXA consistent with osteopenia but FRAX score warranting treatment given risk of hip fracture was 3.8%. The major risk for fracture was 13 %.  Given her CKD would like to puruse Prolia

## 2018-03-17 LAB
ANION GAP SERPL CALCULATED.3IONS-SCNC: 21 MEQ/L (ref 10–19)
BUN BLDV-MCNC: 53 MG/DL (ref 8–23)
CALCIUM SERPL-MCNC: 8.9 MG/DL (ref 8.5–10.5)
CHLORIDE BLD-SCNC: 101 MEQ/L (ref 95–107)
CO2: 24 MEQ/L (ref 19–31)
CREAT SERPL-MCNC: 2 MG/DL (ref 0.6–1.3)
EGFR AFRICAN AMERICAN: 25.4 ML/MIN/1.73 M2
EGFR IF NONAFRICAN AMERICAN: 21.9 ML/MIN/1.73 M2
GLUCOSE: 149 MG/DL (ref 70–99)
PHOSPHORUS: 3.6 MG/DL (ref 2.5–4.5)
POTASSIUM SERPL-SCNC: 4.5 MEQ/L (ref 3.5–5.4)
SODIUM BLD-SCNC: 146 MEQ/L (ref 135–146)

## 2018-03-19 ENCOUNTER — OFFICE VISIT (OUTPATIENT)
Dept: NEPHROLOGY | Age: 83
End: 2018-03-19
Payer: MEDICARE

## 2018-03-19 VITALS
BODY MASS INDEX: 34.31 KG/M2 | RESPIRATION RATE: 18 BRPM | WEIGHT: 200 LBS | SYSTOLIC BLOOD PRESSURE: 158 MMHG | HEART RATE: 80 BPM | DIASTOLIC BLOOD PRESSURE: 64 MMHG

## 2018-03-19 DIAGNOSIS — N18.4 CKD (CHRONIC KIDNEY DISEASE), STAGE IV (HCC): Primary | ICD-10-CM

## 2018-03-19 DIAGNOSIS — I10 ESSENTIAL HYPERTENSION: ICD-10-CM

## 2018-03-19 DIAGNOSIS — D63.8 ANEMIA OF CHRONIC DISEASE: ICD-10-CM

## 2018-03-19 DIAGNOSIS — N18.4 TYPE 2 DIABETES MELLITUS WITH STAGE 4 CHRONIC KIDNEY DISEASE, UNSPECIFIED LONG TERM INSULIN USE STATUS: ICD-10-CM

## 2018-03-19 DIAGNOSIS — E11.22 TYPE 2 DIABETES MELLITUS WITH STAGE 4 CHRONIC KIDNEY DISEASE, UNSPECIFIED LONG TERM INSULIN USE STATUS: ICD-10-CM

## 2018-03-19 PROCEDURE — G8417 CALC BMI ABV UP PARAM F/U: HCPCS | Performed by: INTERNAL MEDICINE

## 2018-03-19 PROCEDURE — 4040F PNEUMOC VAC/ADMIN/RCVD: CPT | Performed by: INTERNAL MEDICINE

## 2018-03-19 PROCEDURE — 1090F PRES/ABSN URINE INCON ASSESS: CPT | Performed by: INTERNAL MEDICINE

## 2018-03-19 PROCEDURE — G8484 FLU IMMUNIZE NO ADMIN: HCPCS | Performed by: INTERNAL MEDICINE

## 2018-03-19 PROCEDURE — 99214 OFFICE O/P EST MOD 30 MIN: CPT | Performed by: INTERNAL MEDICINE

## 2018-03-19 PROCEDURE — 1036F TOBACCO NON-USER: CPT | Performed by: INTERNAL MEDICINE

## 2018-03-19 PROCEDURE — G8427 DOCREV CUR MEDS BY ELIG CLIN: HCPCS | Performed by: INTERNAL MEDICINE

## 2018-03-19 PROCEDURE — 1123F ACP DISCUSS/DSCN MKR DOCD: CPT | Performed by: INTERNAL MEDICINE

## 2018-03-19 PROCEDURE — G8598 ASA/ANTIPLAT THER USED: HCPCS | Performed by: INTERNAL MEDICINE

## 2018-03-19 NOTE — PROGRESS NOTES
negative  Allergic/Immunologic: negative     Medications:     Current Outpatient Prescriptions   Medication Sig Dispense Refill    Cholecalciferol (VITAMIN D3) 5000 units CAPS Take 1 capsule by mouth daily      isosorbide mononitrate (IMDUR) 60 MG extended release tablet Take 60 mg by mouth daily      metoprolol succinate (TOPROL XL) 50 MG extended release tablet Take 50 mg by mouth daily      losartan (COZAAR) 100 MG tablet Take 1 tablet by mouth daily 90 tablet 3    furosemide (LASIX) 20 MG tablet Take 20 mg by mouth every other day      oxybutynin (DITROPAN) 5 MG tablet Take 5 mg by mouth 2 times daily      hydrALAZINE (APRESOLINE) 100 MG tablet Take 1 tablet by mouth 3 times daily 90 tablet 3    LUTEIN-ZEAXANTHIN PO Take by mouth      clopidogrel (PLAVIX) 75 MG tablet Take 1 tablet by mouth daily 90 tablet 3    glipiZIDE (GLUCOTROL) 5 MG tablet Take 5 mg by mouth daily      Levothyroxine Sodium 150 MCG CAPS Take 150 mcg by mouth Daily       aspirin 81 MG tablet Take 81 mg by mouth daily.  Multiple Vitamins-Minerals (VISION FORMULA PO) Take by mouth daily      famotidine (PEPCID) 20 MG tablet Take 20 mg by mouth daily as needed      lisinopril (PRINIVIL;ZESTRIL) 5 MG tablet Take 0.5 tablets by mouth daily (Patient taking differently: Take 40 mg by mouth daily ) 30 tablet 1     No current facility-administered medications for this visit.         Lab Results:    CBC:   Lab Results   Component Value Date    WBC 8.1 02/27/2018    HGB 10.7 (A) 02/27/2018    HCT 31.4 (A) 02/27/2018    MCV 96.6 02/27/2018     02/27/2018     BMP:    Lab Results   Component Value Date     02/27/2018     12/18/2017     12/14/2017    K 4.8 02/27/2018    K 4.4 12/18/2017    K 4.4 12/14/2017     02/27/2018     12/18/2017     12/14/2017    CO2 24 02/27/2018    CO2 27 12/18/2017    CO2 27 12/14/2017    BUN 73 02/27/2018    BUN 64 12/18/2017    BUN 64 (H) 12/14/2017    CREATININE 2.3

## 2018-03-20 LAB — PARATHYROID HORMONE INTACT: 92 PG/ML (ref 11–67)

## 2018-04-10 ENCOUNTER — OFFICE VISIT (OUTPATIENT)
Dept: RHEUMATOLOGY | Age: 83
End: 2018-04-10
Payer: MEDICARE

## 2018-04-10 ENCOUNTER — TELEPHONE (OUTPATIENT)
Dept: NEPHROLOGY | Age: 83
End: 2018-04-10

## 2018-04-10 VITALS
OXYGEN SATURATION: 96 % | SYSTOLIC BLOOD PRESSURE: 134 MMHG | DIASTOLIC BLOOD PRESSURE: 61 MMHG | BODY MASS INDEX: 33.83 KG/M2 | HEART RATE: 72 BPM | WEIGHT: 197.2 LBS

## 2018-04-10 DIAGNOSIS — M85.89 OSTEOPENIA OF MULTIPLE SITES: ICD-10-CM

## 2018-04-10 DIAGNOSIS — M19.042 OSTEOARTHRITIS OF BOTH HANDS, UNSPECIFIED OSTEOARTHRITIS TYPE: ICD-10-CM

## 2018-04-10 DIAGNOSIS — M19.041 OSTEOARTHRITIS OF BOTH HANDS, UNSPECIFIED OSTEOARTHRITIS TYPE: ICD-10-CM

## 2018-04-10 DIAGNOSIS — M1A.09X1 IDIOPATHIC CHRONIC GOUT OF MULTIPLE SITES WITH TOPHUS: Primary | ICD-10-CM

## 2018-04-10 DIAGNOSIS — M54.2 CERVICALGIA: ICD-10-CM

## 2018-04-10 DIAGNOSIS — M48.02 CERVICAL SPINAL STENOSIS: ICD-10-CM

## 2018-04-10 DIAGNOSIS — Z51.81 MEDICATION MONITORING ENCOUNTER: ICD-10-CM

## 2018-04-10 PROCEDURE — 1036F TOBACCO NON-USER: CPT | Performed by: INTERNAL MEDICINE

## 2018-04-10 PROCEDURE — 99214 OFFICE O/P EST MOD 30 MIN: CPT | Performed by: INTERNAL MEDICINE

## 2018-04-10 PROCEDURE — G8427 DOCREV CUR MEDS BY ELIG CLIN: HCPCS | Performed by: INTERNAL MEDICINE

## 2018-04-10 PROCEDURE — 4040F PNEUMOC VAC/ADMIN/RCVD: CPT | Performed by: INTERNAL MEDICINE

## 2018-04-10 PROCEDURE — 1090F PRES/ABSN URINE INCON ASSESS: CPT | Performed by: INTERNAL MEDICINE

## 2018-04-10 PROCEDURE — G8598 ASA/ANTIPLAT THER USED: HCPCS | Performed by: INTERNAL MEDICINE

## 2018-04-10 PROCEDURE — G8417 CALC BMI ABV UP PARAM F/U: HCPCS | Performed by: INTERNAL MEDICINE

## 2018-04-10 PROCEDURE — 1123F ACP DISCUSS/DSCN MKR DOCD: CPT | Performed by: INTERNAL MEDICINE

## 2018-04-10 RX ORDER — ALLOPURINOL 100 MG/1
100 TABLET ORAL DAILY
Qty: 30 TABLET | Refills: 0 | Status: SHIPPED | OUTPATIENT
Start: 2018-04-10 | End: 2018-04-30 | Stop reason: SDUPTHER

## 2018-04-10 ASSESSMENT — ENCOUNTER SYMPTOMS
RESPIRATORY NEGATIVE: 1
DIARRHEA: 1
EYES NEGATIVE: 1
BLOOD IN STOOL: 0
CONSTIPATION: 1
ORTHOPNEA: 0
BACK PAIN: 1

## 2018-04-27 LAB
ALBUMIN SERPL-MCNC: 4 G/DL
ALP BLD-CCNC: 104 U/L
ALT SERPL-CCNC: 8 U/L
ANION GAP SERPL CALCULATED.3IONS-SCNC: 14 MMOL/L
AST SERPL-CCNC: 16 U/L
BILIRUB SERPL-MCNC: 0.4 MG/DL (ref 0.1–1.4)
BUN BLDV-MCNC: 52 MG/DL
CALCIUM SERPL-MCNC: 9.5 MG/DL
CHLORIDE BLD-SCNC: 101 MMOL/L
CO2: 27 MMOL/L
CREAT SERPL-MCNC: 1.9 MG/DL
GFR CALCULATED: 23.3
GLUCOSE BLD-MCNC: 94 MG/DL
POTASSIUM SERPL-SCNC: 4.6 MMOL/L
SODIUM BLD-SCNC: 142 MMOL/L
TOTAL PROTEIN: 6.1
URIC ACID: 7.6

## 2018-04-30 DIAGNOSIS — M1A.09X1 IDIOPATHIC CHRONIC GOUT OF MULTIPLE SITES WITH TOPHUS: Primary | ICD-10-CM

## 2018-04-30 RX ORDER — ALLOPURINOL 100 MG/1
200 TABLET ORAL DAILY
Qty: 60 TABLET | Refills: 0 | Status: SHIPPED | OUTPATIENT
Start: 2018-04-30 | End: 2018-05-14 | Stop reason: SDUPTHER

## 2018-05-01 ENCOUNTER — TELEPHONE (OUTPATIENT)
Dept: RHEUMATOLOGY | Age: 83
End: 2018-05-01

## 2018-05-11 LAB
ALBUMIN SERPL-MCNC: 3.8 G/DL
ALP BLD-CCNC: 104 U/L
ALT SERPL-CCNC: 7 U/L
ANION GAP SERPL CALCULATED.3IONS-SCNC: 12 MMOL/L
AST SERPL-CCNC: NORMAL U/L
BILIRUB SERPL-MCNC: 0.3 MG/DL (ref 0.1–1.4)
BUN BLDV-MCNC: 53 MG/DL
CALCIUM SERPL-MCNC: 9.3 MG/DL
CHLORIDE BLD-SCNC: 104 MMOL/L
CO2: 25 MMOL/L
CREAT SERPL-MCNC: 2 MG/DL
GFR CALCULATED: 21.9
GLUCOSE BLD-MCNC: 178 MG/DL
POTASSIUM SERPL-SCNC: 4.5 MMOL/L
SODIUM BLD-SCNC: 141 MMOL/L
TOTAL PROTEIN: 6.2
URIC ACID: 6.8

## 2018-05-14 RX ORDER — ALLOPURINOL 300 MG/1
300 TABLET ORAL DAILY
Qty: 30 TABLET | Refills: 0 | Status: SHIPPED | OUTPATIENT
Start: 2018-05-14 | End: 2018-05-30 | Stop reason: SDUPTHER

## 2018-05-15 ENCOUNTER — TELEPHONE (OUTPATIENT)
Dept: RHEUMATOLOGY | Age: 83
End: 2018-05-15

## 2018-05-16 ENCOUNTER — TELEPHONE (OUTPATIENT)
Dept: RHEUMATOLOGY | Age: 83
End: 2018-05-16

## 2018-05-22 ENCOUNTER — HOSPITAL ENCOUNTER (OUTPATIENT)
Dept: NURSING | Age: 83
Discharge: HOME | End: 2018-05-22
Payer: MEDICARE

## 2018-05-22 VITALS
SYSTOLIC BLOOD PRESSURE: 172 MMHG | DIASTOLIC BLOOD PRESSURE: 74 MMHG | TEMPERATURE: 96.2 F | HEART RATE: 65 BPM | RESPIRATION RATE: 18 BRPM | OXYGEN SATURATION: 94 %

## 2018-05-22 DIAGNOSIS — M85.80 OSTEOPENIA, UNSPECIFIED LOCATION: ICD-10-CM

## 2018-05-22 PROCEDURE — 96372 THER/PROPH/DIAG INJ SC/IM: CPT

## 2018-05-22 PROCEDURE — 6360000002 HC RX W HCPCS: Performed by: INTERNAL MEDICINE

## 2018-05-22 RX ADMIN — DENOSUMAB 60 MG: 60 INJECTION SUBCUTANEOUS at 10:41

## 2018-05-22 ASSESSMENT — PAIN DESCRIPTION - LOCATION: LOCATION: COCCYX

## 2018-05-22 ASSESSMENT — PAIN SCALES - GENERAL: PAINLEVEL_OUTOF10: 5

## 2018-05-22 ASSESSMENT — PAIN DESCRIPTION - PAIN TYPE: TYPE: ACUTE PAIN

## 2018-05-22 NOTE — PROGRESS NOTES
56 Pt arrives via wheelchair with daughter for prolia injection. Injection explained and questions answered. PT RIGHTS AND RESPONSIBILITIES OFFERED TO PT.  1041 Injection given. Pt tolerated it well with no complaints. 1046 Pt discharged via wheelchair with daughter with instructions with no complaints.            __m__ Safety:       (Environmental)   Waterfall to environment   Ensure ID band is correct and in place/ allergy band as needed   Assess for fall risk   Initiate fall precautions as applicable (fall band, side rails, etc.)   Call light within reach   Bed in low position/ wheels locked    __m__ Pain:        Assess pain level and characteristics   Administer analgesics as ordered   Assess effectiveness of pain management and report to MD as needed    __m__ Knowledge Deficit:   Assess baseline knowledge   Provide teaching at level of understanding   Provide teaching via preferred learning method   Evaluate teaching effectiveness    __m__ Hemodynamic/Respiratory Status:       (Pre and Post Procedure Monitoring)   Assess/Monitor vital signs and LOC   Assess Baseline SpO2 prior to any sedation   Obtain weight/height   Assess vital signs/ LOC until patient meets discharge criteria   Monitor procedure site and notify MD of any issues  

## 2018-05-29 ENCOUNTER — NURSE TRIAGE (OUTPATIENT)
Dept: ADMINISTRATIVE | Age: 83
End: 2018-05-29

## 2018-05-29 LAB
ALBUMIN SERPL-MCNC: 3.9 G/DL
ALP BLD-CCNC: 104 U/L
ALT SERPL-CCNC: 9 U/L
ANION GAP SERPL CALCULATED.3IONS-SCNC: 15 MMOL/L
AST SERPL-CCNC: 16 U/L
BILIRUB SERPL-MCNC: 0.4 MG/DL (ref 0.1–1.4)
BUN BLDV-MCNC: 44 MG/DL
CALCIUM SERPL-MCNC: 8.1 MG/DL
CHLORIDE BLD-SCNC: 102 MMOL/L
CO2: 25 MMOL/L
CREAT SERPL-MCNC: 1.9 MG/DL
GFR CALCULATED: 23.3
GLUCOSE BLD-MCNC: 97 MG/DL
POTASSIUM SERPL-SCNC: 4.1 MMOL/L
SODIUM BLD-SCNC: 142 MMOL/L
TOTAL PROTEIN: 6.2

## 2018-05-30 ENCOUNTER — HOSPITAL ENCOUNTER (OUTPATIENT)
Dept: GENERAL RADIOLOGY | Age: 83
Discharge: HOME | End: 2018-05-30
Payer: MEDICARE

## 2018-05-30 ENCOUNTER — HOSPITAL ENCOUNTER (OUTPATIENT)
Age: 83
Discharge: HOME | End: 2018-05-30
Payer: MEDICARE

## 2018-05-30 DIAGNOSIS — Z51.81 MEDICATION MONITORING ENCOUNTER: Primary | ICD-10-CM

## 2018-05-30 DIAGNOSIS — Z91.81 STATUS POST FALL: ICD-10-CM

## 2018-05-30 DIAGNOSIS — M1A.9XX1 CHRONIC TOPHACEOUS GOUT: ICD-10-CM

## 2018-05-30 LAB — URIC ACID: 5.2

## 2018-05-30 PROCEDURE — 72100 X-RAY EXAM L-S SPINE 2/3 VWS: CPT

## 2018-05-30 PROCEDURE — 72220 X-RAY EXAM SACRUM TAILBONE: CPT

## 2018-05-30 RX ORDER — ALLOPURINOL 100 MG/1
100 TABLET ORAL DAILY
Qty: 30 TABLET | Refills: 3 | Status: SHIPPED | OUTPATIENT
Start: 2018-05-30 | End: 2018-06-22 | Stop reason: SDUPTHER

## 2018-05-30 RX ORDER — ALLOPURINOL 300 MG/1
300 TABLET ORAL DAILY
Qty: 30 TABLET | Refills: 0 | Status: SHIPPED | OUTPATIENT
Start: 2018-05-30 | End: 2018-06-22 | Stop reason: SDUPTHER

## 2018-06-11 LAB
ALBUMIN SERPL-MCNC: 3.9 G/DL
ALP BLD-CCNC: 178 U/L
ALT SERPL-CCNC: 8 U/L
ANION GAP SERPL CALCULATED.3IONS-SCNC: 13 MMOL/L
AST SERPL-CCNC: 15 U/L
BILIRUB SERPL-MCNC: 0.6 MG/DL (ref 0.1–1.4)
BUN BLDV-MCNC: 39 MG/DL
CALCIUM SERPL-MCNC: 9.3 MG/DL
CHLORIDE BLD-SCNC: 101 MMOL/L
CO2: 28 MMOL/L
CREAT SERPL-MCNC: 1.7 MG/DL
GFR CALCULATED: 26.6
GLUCOSE BLD-MCNC: 170 MG/DL
POTASSIUM SERPL-SCNC: 4.7 MMOL/L
SODIUM BLD-SCNC: 142 MMOL/L
TOTAL PROTEIN: 6.8
URIC ACID: 4.6

## 2018-06-12 ENCOUNTER — OFFICE VISIT (OUTPATIENT)
Dept: RHEUMATOLOGY | Age: 83
End: 2018-06-12
Payer: MEDICARE

## 2018-06-12 VITALS
OXYGEN SATURATION: 96 % | BODY MASS INDEX: 36.43 KG/M2 | DIASTOLIC BLOOD PRESSURE: 74 MMHG | SYSTOLIC BLOOD PRESSURE: 165 MMHG | HEIGHT: 63 IN | HEART RATE: 68 BPM | WEIGHT: 205.6 LBS

## 2018-06-12 DIAGNOSIS — M1A.9XX1 CHRONIC TOPHACEOUS GOUT: Primary | ICD-10-CM

## 2018-06-12 DIAGNOSIS — G89.29 CHRONIC MIDLINE LOW BACK PAIN WITH BILATERAL SCIATICA: ICD-10-CM

## 2018-06-12 DIAGNOSIS — M54.41 CHRONIC MIDLINE LOW BACK PAIN WITH BILATERAL SCIATICA: ICD-10-CM

## 2018-06-12 DIAGNOSIS — M85.89 OSTEOPENIA OF MULTIPLE SITES: ICD-10-CM

## 2018-06-12 DIAGNOSIS — M54.2 CERVICALGIA: ICD-10-CM

## 2018-06-12 DIAGNOSIS — M54.42 CHRONIC MIDLINE LOW BACK PAIN WITH BILATERAL SCIATICA: ICD-10-CM

## 2018-06-12 DIAGNOSIS — M17.0 OSTEOARTHRITIS OF BOTH KNEES, UNSPECIFIED OSTEOARTHRITIS TYPE: ICD-10-CM

## 2018-06-12 PROCEDURE — G8598 ASA/ANTIPLAT THER USED: HCPCS | Performed by: INTERNAL MEDICINE

## 2018-06-12 PROCEDURE — G8427 DOCREV CUR MEDS BY ELIG CLIN: HCPCS | Performed by: INTERNAL MEDICINE

## 2018-06-12 PROCEDURE — 99214 OFFICE O/P EST MOD 30 MIN: CPT | Performed by: INTERNAL MEDICINE

## 2018-06-12 PROCEDURE — G8417 CALC BMI ABV UP PARAM F/U: HCPCS | Performed by: INTERNAL MEDICINE

## 2018-06-12 PROCEDURE — 1123F ACP DISCUSS/DSCN MKR DOCD: CPT | Performed by: INTERNAL MEDICINE

## 2018-06-12 PROCEDURE — 1036F TOBACCO NON-USER: CPT | Performed by: INTERNAL MEDICINE

## 2018-06-12 PROCEDURE — 4040F PNEUMOC VAC/ADMIN/RCVD: CPT | Performed by: INTERNAL MEDICINE

## 2018-06-12 PROCEDURE — 1090F PRES/ABSN URINE INCON ASSESS: CPT | Performed by: INTERNAL MEDICINE

## 2018-06-12 ASSESSMENT — ENCOUNTER SYMPTOMS
EYES NEGATIVE: 1
ORTHOPNEA: 0
DIARRHEA: 1
BACK PAIN: 1
RESPIRATORY NEGATIVE: 1
BLOOD IN STOOL: 0
CONSTIPATION: 1

## 2018-06-14 ENCOUNTER — OFFICE VISIT (OUTPATIENT)
Dept: NEPHROLOGY | Age: 83
End: 2018-06-14
Payer: MEDICARE

## 2018-06-14 VITALS
HEART RATE: 67 BPM | DIASTOLIC BLOOD PRESSURE: 80 MMHG | WEIGHT: 205.8 LBS | OXYGEN SATURATION: 96 % | BODY MASS INDEX: 36.46 KG/M2 | SYSTOLIC BLOOD PRESSURE: 168 MMHG

## 2018-06-14 DIAGNOSIS — I10 ESSENTIAL HYPERTENSION: ICD-10-CM

## 2018-06-14 DIAGNOSIS — N18.3 TYPE 2 DIABETES MELLITUS WITH STAGE 3 CHRONIC KIDNEY DISEASE, UNSPECIFIED LONG TERM INSULIN USE STATUS: ICD-10-CM

## 2018-06-14 DIAGNOSIS — E55.9 VITAMIN D DEFICIENCY: ICD-10-CM

## 2018-06-14 DIAGNOSIS — D63.8 ANEMIA OF CHRONIC DISEASE: ICD-10-CM

## 2018-06-14 DIAGNOSIS — E11.22 TYPE 2 DIABETES MELLITUS WITH STAGE 3 CHRONIC KIDNEY DISEASE, UNSPECIFIED LONG TERM INSULIN USE STATUS: ICD-10-CM

## 2018-06-14 DIAGNOSIS — N18.30 STAGE 3 CHRONIC KIDNEY DISEASE (HCC): Primary | ICD-10-CM

## 2018-06-14 PROCEDURE — 1090F PRES/ABSN URINE INCON ASSESS: CPT | Performed by: INTERNAL MEDICINE

## 2018-06-14 PROCEDURE — 4040F PNEUMOC VAC/ADMIN/RCVD: CPT | Performed by: INTERNAL MEDICINE

## 2018-06-14 PROCEDURE — 99214 OFFICE O/P EST MOD 30 MIN: CPT | Performed by: INTERNAL MEDICINE

## 2018-06-14 PROCEDURE — 1123F ACP DISCUSS/DSCN MKR DOCD: CPT | Performed by: INTERNAL MEDICINE

## 2018-06-14 PROCEDURE — G8598 ASA/ANTIPLAT THER USED: HCPCS | Performed by: INTERNAL MEDICINE

## 2018-06-14 PROCEDURE — 1036F TOBACCO NON-USER: CPT | Performed by: INTERNAL MEDICINE

## 2018-06-14 PROCEDURE — G8417 CALC BMI ABV UP PARAM F/U: HCPCS | Performed by: INTERNAL MEDICINE

## 2018-06-14 PROCEDURE — G8427 DOCREV CUR MEDS BY ELIG CLIN: HCPCS | Performed by: INTERNAL MEDICINE

## 2018-06-14 RX ORDER — METOLAZONE 5 MG/1
5 TABLET ORAL DAILY
Qty: 10 TABLET | Refills: 0 | Status: SHIPPED | OUTPATIENT
Start: 2018-06-14 | End: 2018-07-12

## 2018-06-18 ENCOUNTER — TELEPHONE (OUTPATIENT)
Dept: NEPHROLOGY | Age: 83
End: 2018-06-18

## 2018-06-19 RX ORDER — FUROSEMIDE 40 MG/1
40 TABLET ORAL DAILY
Qty: 30 TABLET | Refills: 5
Start: 2018-06-19 | End: 2018-07-12

## 2018-06-20 LAB
ALBUMIN SERPL-MCNC: 3.9 G/DL
ALP BLD-CCNC: 150 U/L
ALT SERPL-CCNC: 7 U/L
ANION GAP SERPL CALCULATED.3IONS-SCNC: 13 MMOL/L
AST SERPL-CCNC: 12 U/L
BILIRUB SERPL-MCNC: 0.5 MG/DL (ref 0.1–1.4)
BUN BLDV-MCNC: 52 MG/DL
CALCIUM SERPL-MCNC: 8.9 MG/DL
CHLORIDE BLD-SCNC: 97 MMOL/L
CO2: 28 MMOL/L
CREAT SERPL-MCNC: 2 MG/DL
GFR CALCULATED: 21.9
GLUCOSE BLD-MCNC: 141 MG/DL
POTASSIUM SERPL-SCNC: 5 MMOL/L
SODIUM BLD-SCNC: 138 MMOL/L
TOTAL PROTEIN: 6.4
URIC ACID: 4.1

## 2018-06-21 LAB
ANION GAP SERPL CALCULATED.3IONS-SCNC: 15 MEQ/L (ref 10–19)
BUN BLDV-MCNC: 52 MG/DL (ref 8–23)
CALCIUM SERPL-MCNC: 8.9 MG/DL (ref 8.5–10.5)
CHLORIDE BLD-SCNC: 98 MEQ/L (ref 95–107)
CO2: 28 MEQ/L (ref 19–31)
CREAT SERPL-MCNC: 1.9 MG/DL (ref 0.6–1.3)
EGFR AFRICAN AMERICAN: 27 ML/MIN/1.73 M2
EGFR IF NONAFRICAN AMERICAN: 23.3 ML/MIN/1.73 M2
FERRITIN: 42.1 NG/ML (ref 13–200)
GLUCOSE: 141 MG/DL (ref 70–99)
HCT VFR BLD CALC: 30.7 % (ref 36–48)
HEMOGLOBIN: 10.3 G/DL (ref 12–16)
IRON SATURATION: 19 % (ref 20–50)
IRON, SERUM: 63 UG/DL (ref 37–145)
PHOSPHORUS: 3.5 MG/DL (ref 2.5–4.5)
POTASSIUM SERPL-SCNC: 5.1 MEQ/L (ref 3.5–5.4)
SODIUM BLD-SCNC: 141 MEQ/L (ref 135–146)
TOTAL IRON BINDING CAPACITY: 334 MCG/DL (ref 250–450)
UNSATURATED IRON BINDING CAPACITY: 271 UG/DL (ref 112–347)

## 2018-06-22 DIAGNOSIS — M1A.9XX1 CHRONIC TOPHACEOUS GOUT: ICD-10-CM

## 2018-06-22 LAB
PARATHYROID HORMONE INTACT: 308 PG/ML (ref 11–67)
VITAMIN D 25-HYDROXY: 36 NG/ML

## 2018-06-22 RX ORDER — ALLOPURINOL 300 MG/1
300 TABLET ORAL DAILY
Qty: 90 TABLET | Refills: 1 | Status: SHIPPED | OUTPATIENT
Start: 2018-06-22 | End: 2018-08-06 | Stop reason: SDUPTHER

## 2018-06-22 RX ORDER — ALLOPURINOL 100 MG/1
100 TABLET ORAL DAILY
Qty: 90 TABLET | Refills: 1 | Status: SHIPPED | OUTPATIENT
Start: 2018-06-22 | End: 2018-07-12

## 2018-06-25 ENCOUNTER — OFFICE VISIT (OUTPATIENT)
Dept: NEPHROLOGY | Age: 83
End: 2018-06-25
Payer: MEDICARE

## 2018-06-25 VITALS
OXYGEN SATURATION: 95 % | WEIGHT: 200.2 LBS | DIASTOLIC BLOOD PRESSURE: 53 MMHG | HEART RATE: 76 BPM | SYSTOLIC BLOOD PRESSURE: 134 MMHG | BODY MASS INDEX: 35.46 KG/M2

## 2018-06-25 DIAGNOSIS — D63.8 ANEMIA OF CHRONIC DISEASE: ICD-10-CM

## 2018-06-25 DIAGNOSIS — E87.70 HYPERVOLEMIA, UNSPECIFIED HYPERVOLEMIA TYPE: ICD-10-CM

## 2018-06-25 DIAGNOSIS — E11.22 TYPE 2 DIABETES MELLITUS WITH STAGE 3 CHRONIC KIDNEY DISEASE, UNSPECIFIED LONG TERM INSULIN USE STATUS: ICD-10-CM

## 2018-06-25 DIAGNOSIS — N18.3 TYPE 2 DIABETES MELLITUS WITH STAGE 3 CHRONIC KIDNEY DISEASE, UNSPECIFIED LONG TERM INSULIN USE STATUS: ICD-10-CM

## 2018-06-25 DIAGNOSIS — N18.4 CKD (CHRONIC KIDNEY DISEASE), STAGE IV (HCC): Primary | ICD-10-CM

## 2018-06-25 DIAGNOSIS — I51.7 LEFT ATRIAL DILATION: ICD-10-CM

## 2018-06-25 DIAGNOSIS — E55.9 VITAMIN D DEFICIENCY: ICD-10-CM

## 2018-06-25 DIAGNOSIS — I10 ESSENTIAL HYPERTENSION: ICD-10-CM

## 2018-06-25 PROCEDURE — G8598 ASA/ANTIPLAT THER USED: HCPCS | Performed by: INTERNAL MEDICINE

## 2018-06-25 PROCEDURE — 99214 OFFICE O/P EST MOD 30 MIN: CPT | Performed by: INTERNAL MEDICINE

## 2018-06-25 PROCEDURE — 4040F PNEUMOC VAC/ADMIN/RCVD: CPT | Performed by: INTERNAL MEDICINE

## 2018-06-25 PROCEDURE — G8427 DOCREV CUR MEDS BY ELIG CLIN: HCPCS | Performed by: INTERNAL MEDICINE

## 2018-06-25 PROCEDURE — 1123F ACP DISCUSS/DSCN MKR DOCD: CPT | Performed by: INTERNAL MEDICINE

## 2018-06-25 PROCEDURE — G8417 CALC BMI ABV UP PARAM F/U: HCPCS | Performed by: INTERNAL MEDICINE

## 2018-06-25 PROCEDURE — 1036F TOBACCO NON-USER: CPT | Performed by: INTERNAL MEDICINE

## 2018-06-25 PROCEDURE — 1090F PRES/ABSN URINE INCON ASSESS: CPT | Performed by: INTERNAL MEDICINE

## 2018-06-25 RX ORDER — FUROSEMIDE 40 MG/1
40 TABLET ORAL 2 TIMES DAILY
Qty: 60 TABLET | Refills: 3 | Status: ON HOLD | OUTPATIENT
Start: 2018-06-25 | End: 2018-08-31 | Stop reason: HOSPADM

## 2018-07-06 LAB
ALBUMIN SERPL-MCNC: NORMAL G/DL
ALP BLD-CCNC: NORMAL U/L
ALT SERPL-CCNC: NORMAL U/L
ANION GAP SERPL CALCULATED.3IONS-SCNC: 15 MMOL/L
AST SERPL-CCNC: NORMAL U/L
BILIRUB SERPL-MCNC: NORMAL MG/DL (ref 0.1–1.4)
BUN BLDV-MCNC: 80 MG/DL
CALCIUM SERPL-MCNC: 9 MG/DL
CHLORIDE BLD-SCNC: 99 MMOL/L
CO2: 25 MMOL/L
CREAT SERPL-MCNC: 2.3 MG/DL
GFR CALCULATED: 18.5
GLUCOSE BLD-MCNC: 83 MG/DL
POTASSIUM SERPL-SCNC: 5 MMOL/L
SODIUM BLD-SCNC: 139 MMOL/L
TOTAL PROTEIN: NORMAL
URIC ACID: 4

## 2018-07-09 ENCOUNTER — TELEPHONE (OUTPATIENT)
Dept: RHEUMATOLOGY | Age: 83
End: 2018-07-09

## 2018-07-09 RX ORDER — LOSARTAN POTASSIUM 100 MG/1
100 TABLET ORAL DAILY
Qty: 90 TABLET | Refills: 3 | Status: SHIPPED | OUTPATIENT
Start: 2018-07-09

## 2018-07-12 ENCOUNTER — OFFICE VISIT (OUTPATIENT)
Dept: CARDIOLOGY CLINIC | Age: 83
End: 2018-07-12
Payer: MEDICARE

## 2018-07-12 VITALS
WEIGHT: 198 LBS | HEIGHT: 63 IN | SYSTOLIC BLOOD PRESSURE: 180 MMHG | HEART RATE: 67 BPM | BODY MASS INDEX: 35.08 KG/M2 | DIASTOLIC BLOOD PRESSURE: 78 MMHG

## 2018-07-12 DIAGNOSIS — I77.9 BILATERAL CAROTID ARTERY DISEASE (HCC): ICD-10-CM

## 2018-07-12 DIAGNOSIS — I10 UNCONTROLLED HYPERTENSION: ICD-10-CM

## 2018-07-12 DIAGNOSIS — Z95.5 PRESENCE OF STENT IN LEFT CIRCUMFLEX CORONARY ARTERY: ICD-10-CM

## 2018-07-12 DIAGNOSIS — Z95.5 PRESENCE OF STENT IN RIGHT CORONARY ARTERY: ICD-10-CM

## 2018-07-12 DIAGNOSIS — I25.118 CORONARY ARTERY DISEASE INVOLVING NATIVE CORONARY ARTERY OF NATIVE HEART WITH OTHER FORM OF ANGINA PECTORIS (HCC): Primary | ICD-10-CM

## 2018-07-12 PROCEDURE — 1123F ACP DISCUSS/DSCN MKR DOCD: CPT | Performed by: INTERNAL MEDICINE

## 2018-07-12 PROCEDURE — 1101F PT FALLS ASSESS-DOCD LE1/YR: CPT | Performed by: INTERNAL MEDICINE

## 2018-07-12 PROCEDURE — 99213 OFFICE O/P EST LOW 20 MIN: CPT | Performed by: INTERNAL MEDICINE

## 2018-07-12 PROCEDURE — 93000 ELECTROCARDIOGRAM COMPLETE: CPT | Performed by: INTERNAL MEDICINE

## 2018-07-12 PROCEDURE — G8598 ASA/ANTIPLAT THER USED: HCPCS | Performed by: INTERNAL MEDICINE

## 2018-07-12 PROCEDURE — 1036F TOBACCO NON-USER: CPT | Performed by: INTERNAL MEDICINE

## 2018-07-12 PROCEDURE — G8427 DOCREV CUR MEDS BY ELIG CLIN: HCPCS | Performed by: INTERNAL MEDICINE

## 2018-07-12 PROCEDURE — 4040F PNEUMOC VAC/ADMIN/RCVD: CPT | Performed by: INTERNAL MEDICINE

## 2018-07-12 PROCEDURE — G8417 CALC BMI ABV UP PARAM F/U: HCPCS | Performed by: INTERNAL MEDICINE

## 2018-07-12 PROCEDURE — 1090F PRES/ABSN URINE INCON ASSESS: CPT | Performed by: INTERNAL MEDICINE

## 2018-07-12 RX ORDER — ACETAMINOPHEN 500 MG
500 TABLET ORAL EVERY 6 HOURS PRN
COMMUNITY

## 2018-07-12 NOTE — PROGRESS NOTES
Pt here for check up   Having MARY Sharma wanted pt to be seen   Pt states she is always SOB   Denies chest pain, dizziness
Component Value Date    ALKPHOS 150 06/20/2018    ALT 7 06/20/2018    AST 12 06/20/2018    PROT 6.2 04/30/2017    BILITOT 0.5 06/20/2018    BILIDIR <0.2 04/30/2017    LABALBU 3.9 06/20/2018       Lab Results   Component Value Date    TRIG 175 01/13/2016    HDL 43 01/13/2016    LDLCALC 81 01/13/2016    LABVLDL 35 01/13/2016         Assessment/Plan:    C/o angina II  Significant CAD as above. Cath 3/25/15 - Status post successful percutaneous coronary intervention of the distal LAD with 3.0 x 34 mm drug stent. Cath on 4/24/15 - Status post successful percutaneous coronary intervention of the right coronary artery with a 3.0x38 mm drug-eluting stent postdilated to 3.3-3.6 mm. Continue ASA/plavix/imdur/BB. Non invasive cardiac testing will be ordered to further evaluate for any ischemic or flow- limiting CAD and/ or structural heart disease as a cause of the patient symptoms. We will proceed with a Stress Cardiolite test and echo soon. Patient  agrees with that work up and its risks and benefits and potential need for additional testing if stress test is abnormal such as cardiac catheterization. In addition patient is advised to avoid strenuous activity ( Not limited to sexual activities,  driving heavy equipments, traveling long distances,  hunting and avoiding stressfull events) in the mean time while we await stress test testing. Hypertension, on medical treatment. Not well Controlled. On 5 antiHTN meds  No Renal artery stenosis by renal artery duplex. Increase imdur to 120 mg daily. Diabetes mellitus: Followed by family physician. Patient needs very tight control to minimize cardiac risk.     Normal EF    Less than 50 percent disease bilateral      Will schedule follow up appointment in 2 wks

## 2018-07-17 ENCOUNTER — TELEPHONE (OUTPATIENT)
Dept: NEPHROLOGY | Age: 83
End: 2018-07-17

## 2018-07-17 NOTE — TELEPHONE ENCOUNTER
Patient called to get clarification on the dose adjustment made on 06.25.2018 by Dr Juliet Vegas  I verbalized the following with her  \"Continue furosemide 40 mg bid for next 3 days and back to once a day thereafter \"  Patient stated that the Rx directions did not state this. Patient stated that the directions were to take lasix 40 mg po BID and this is how she has been taking the lasix every sinceher last appointment.    Patient voiced understanding as to how she is to take her lasix now    Dr Juliet Vegas   Please advise if there is anything different at this time

## 2018-07-24 ENCOUNTER — HOSPITAL ENCOUNTER (OUTPATIENT)
Dept: NON INVASIVE DIAGNOSTICS | Age: 83
Discharge: HOME | End: 2018-07-24
Payer: MEDICARE

## 2018-07-24 ENCOUNTER — HOSPITAL ENCOUNTER (OUTPATIENT)
Dept: ULTRASOUND IMAGING | Age: 83
Discharge: HOME | End: 2018-07-24
Payer: MEDICARE

## 2018-07-24 VITALS — HEIGHT: 63 IN | BODY MASS INDEX: 34.38 KG/M2 | WEIGHT: 194 LBS

## 2018-07-24 DIAGNOSIS — I25.118 CORONARY ARTERY DISEASE INVOLVING NATIVE CORONARY ARTERY OF NATIVE HEART WITH OTHER FORM OF ANGINA PECTORIS (HCC): ICD-10-CM

## 2018-07-24 DIAGNOSIS — I10 UNCONTROLLED HYPERTENSION: ICD-10-CM

## 2018-07-24 LAB
LV EF: 55 %
LVEF MODALITY: NORMAL

## 2018-07-24 PROCEDURE — 78452 HT MUSCLE IMAGE SPECT MULT: CPT

## 2018-07-24 PROCEDURE — 93306 TTE W/DOPPLER COMPLETE: CPT

## 2018-07-24 PROCEDURE — 93975 VASCULAR STUDY: CPT

## 2018-07-24 PROCEDURE — 93017 CV STRESS TEST TRACING ONLY: CPT

## 2018-07-24 PROCEDURE — 3430000000 HC RX DIAGNOSTIC RADIOPHARMACEUTICAL: Performed by: INTERNAL MEDICINE

## 2018-07-24 PROCEDURE — 93976 VASCULAR STUDY: CPT

## 2018-07-24 PROCEDURE — A9500 TC99M SESTAMIBI: HCPCS | Performed by: INTERNAL MEDICINE

## 2018-07-24 PROCEDURE — 6360000002 HC RX W HCPCS

## 2018-07-24 RX ADMIN — Medication 30 MILLICURIE: at 12:40

## 2018-07-24 RX ADMIN — Medication 9.4 MILLICURIE: at 11:41

## 2018-07-27 ENCOUNTER — OFFICE VISIT (OUTPATIENT)
Dept: CARDIOLOGY CLINIC | Age: 83
End: 2018-07-27
Payer: MEDICARE

## 2018-07-27 VITALS
HEART RATE: 58 BPM | DIASTOLIC BLOOD PRESSURE: 59 MMHG | WEIGHT: 194 LBS | SYSTOLIC BLOOD PRESSURE: 144 MMHG | BODY MASS INDEX: 34.38 KG/M2 | HEIGHT: 63 IN

## 2018-07-27 DIAGNOSIS — I77.9 BILATERAL CAROTID ARTERY DISEASE (HCC): ICD-10-CM

## 2018-07-27 DIAGNOSIS — I10 ESSENTIAL HYPERTENSION: ICD-10-CM

## 2018-07-27 DIAGNOSIS — I25.118 CORONARY ARTERY DISEASE INVOLVING NATIVE CORONARY ARTERY OF NATIVE HEART WITH OTHER FORM OF ANGINA PECTORIS (HCC): Primary | ICD-10-CM

## 2018-07-27 DIAGNOSIS — Z95.5 PRESENCE OF STENT IN LEFT CIRCUMFLEX CORONARY ARTERY: ICD-10-CM

## 2018-07-27 DIAGNOSIS — Z95.5 PRESENCE OF STENT IN RIGHT CORONARY ARTERY: ICD-10-CM

## 2018-07-27 PROCEDURE — 1090F PRES/ABSN URINE INCON ASSESS: CPT | Performed by: INTERNAL MEDICINE

## 2018-07-27 PROCEDURE — G8427 DOCREV CUR MEDS BY ELIG CLIN: HCPCS | Performed by: INTERNAL MEDICINE

## 2018-07-27 PROCEDURE — 4040F PNEUMOC VAC/ADMIN/RCVD: CPT | Performed by: INTERNAL MEDICINE

## 2018-07-27 PROCEDURE — G8598 ASA/ANTIPLAT THER USED: HCPCS | Performed by: INTERNAL MEDICINE

## 2018-07-27 PROCEDURE — 1123F ACP DISCUSS/DSCN MKR DOCD: CPT | Performed by: INTERNAL MEDICINE

## 2018-07-27 PROCEDURE — 1101F PT FALLS ASSESS-DOCD LE1/YR: CPT | Performed by: INTERNAL MEDICINE

## 2018-07-27 PROCEDURE — 99213 OFFICE O/P EST LOW 20 MIN: CPT | Performed by: INTERNAL MEDICINE

## 2018-07-27 PROCEDURE — 1036F TOBACCO NON-USER: CPT | Performed by: INTERNAL MEDICINE

## 2018-07-27 PROCEDURE — G8417 CALC BMI ABV UP PARAM F/U: HCPCS | Performed by: INTERNAL MEDICINE

## 2018-07-27 NOTE — PROGRESS NOTES
Patient here for a 2 week follow up    Patient denies cp, dizziness and swelling     Patient complains of sob and palpitations     Patient did not bring a medication list. Patient verbally stated nothing has changed.

## 2018-07-29 ENCOUNTER — HOSPITAL ENCOUNTER (OUTPATIENT)
Age: 83
Discharge: HOME | End: 2018-07-30
Attending: INTERNAL MEDICINE | Admitting: INTERNAL MEDICINE
Payer: MEDICARE

## 2018-07-29 PROBLEM — R06.02 SOB (SHORTNESS OF BREATH): Status: ACTIVE | Noted: 2018-07-29

## 2018-07-29 PROCEDURE — 2709999900 HC NON-CHARGEABLE SUPPLY

## 2018-07-29 RX ORDER — ALLOPURINOL 100 MG/1
100 TABLET ORAL NIGHTLY
COMMUNITY
End: 2018-08-06 | Stop reason: SDUPTHER

## 2018-07-29 RX ORDER — SODIUM CHLORIDE 0.9 % (FLUSH) 0.9 %
10 SYRINGE (ML) INJECTION PRN
Status: DISCONTINUED | OUTPATIENT
Start: 2018-07-29 | End: 2018-07-30

## 2018-07-29 RX ORDER — ASPIRIN 325 MG
325 TABLET ORAL ONCE
Status: DISCONTINUED | OUTPATIENT
Start: 2018-07-30 | End: 2018-07-30

## 2018-07-29 RX ORDER — SODIUM CHLORIDE 0.9 % (FLUSH) 0.9 %
10 SYRINGE (ML) INJECTION EVERY 12 HOURS SCHEDULED
Status: DISCONTINUED | OUTPATIENT
Start: 2018-07-30 | End: 2018-07-30

## 2018-07-29 RX ORDER — SODIUM CHLORIDE 9 MG/ML
INJECTION, SOLUTION INTRAVENOUS CONTINUOUS
Status: DISCONTINUED | OUTPATIENT
Start: 2018-07-30 | End: 2018-07-30

## 2018-07-29 RX ORDER — NITROGLYCERIN 0.4 MG/1
0.4 TABLET SUBLINGUAL EVERY 5 MIN PRN
Status: DISCONTINUED | OUTPATIENT
Start: 2018-07-29 | End: 2018-07-30

## 2018-07-29 ASSESSMENT — PAIN SCALES - GENERAL: PAINLEVEL_OUTOF10: 0

## 2018-07-30 VITALS
RESPIRATION RATE: 16 BRPM | BODY MASS INDEX: 35.08 KG/M2 | WEIGHT: 198 LBS | DIASTOLIC BLOOD PRESSURE: 75 MMHG | HEIGHT: 63 IN | TEMPERATURE: 97.8 F | HEART RATE: 62 BPM | OXYGEN SATURATION: 96 % | SYSTOLIC BLOOD PRESSURE: 150 MMHG

## 2018-07-30 LAB
ABO: NORMAL
ACTIVATED CLOTTING TIME: 175 SECONDS (ref 1–150)
ANION GAP SERPL CALCULATED.3IONS-SCNC: 13 MEQ/L (ref 8–16)
ANTIBODY SCREEN: NORMAL
APTT: 29.2 SECONDS (ref 22–38)
BUN BLDV-MCNC: 69 MG/DL (ref 7–22)
CALCIUM SERPL-MCNC: 8.3 MG/DL (ref 8.5–10.5)
CHLORIDE BLD-SCNC: 106 MEQ/L (ref 98–111)
CO2: 24 MEQ/L (ref 23–33)
COLLECTED BY:: ABNORMAL
COLLECTED BY:: NORMAL
CREAT SERPL-MCNC: 2.3 MG/DL (ref 0.4–1.2)
EKG ATRIAL RATE: 40 BPM
EKG P AXIS: 90 DEGREES
EKG P-R INTERVAL: 318 MS
EKG Q-T INTERVAL: 420 MS
EKG QRS DURATION: 108 MS
EKG QTC CALCULATION (BAZETT): 462 MS
EKG R AXIS: -65 DEGREES
EKG T AXIS: 89 DEGREES
EKG VENTRICULAR RATE: 73 BPM
ERYTHROCYTE [DISTWIDTH] IN BLOOD BY AUTOMATED COUNT: 14.5 % (ref 11.5–14.5)
ERYTHROCYTE [DISTWIDTH] IN BLOOD BY AUTOMATED COUNT: 52 FL (ref 35–45)
GFR SERPL CREATININE-BSD FRML MDRD: 20 ML/MIN/1.73M2
GLUCOSE BLD-MCNC: 104 MG/DL (ref 70–108)
HCT VFR BLD CALC: 29.3 % (ref 37–47)
HEMOGLOBIN: 9.5 GM/DL (ref 12–16)
INR BLD: 0.98 (ref 0.85–1.13)
MCH RBC QN AUTO: 31.8 PG (ref 26–33)
MCHC RBC AUTO-ENTMCNC: 32.4 GM/DL (ref 32.2–35.5)
MCV RBC AUTO: 98 FL (ref 81–99)
PLATELET # BLD: 224 THOU/MM3 (ref 130–400)
PMV BLD AUTO: 10.6 FL (ref 9.4–12.4)
POC O2 SATURATION: 71 % (ref 94–97)
POC O2 SATURATION: 71 % (ref 94–97)
POC O2 SATURATION: 72 % (ref 94–97)
POC O2 SATURATION: 94 % (ref 94–97)
POTASSIUM REFLEX MAGNESIUM: 4.2 MEQ/L (ref 3.5–5.2)
RBC # BLD: 2.99 MILL/MM3 (ref 4.2–5.4)
RH FACTOR: NORMAL
SODIUM BLD-SCNC: 143 MEQ/L (ref 135–145)
SOURCE, BLOOD GAS: ABNORMAL
SOURCE, BLOOD GAS: NORMAL
WBC # BLD: 5 THOU/MM3 (ref 4.8–10.8)

## 2018-07-30 PROCEDURE — 96361 HYDRATE IV INFUSION ADD-ON: CPT

## 2018-07-30 PROCEDURE — 86850 RBC ANTIBODY SCREEN: CPT

## 2018-07-30 PROCEDURE — 2580000003 HC RX 258: Performed by: PHYSICIAN ASSISTANT

## 2018-07-30 PROCEDURE — 85347 COAGULATION TIME ACTIVATED: CPT

## 2018-07-30 PROCEDURE — 85730 THROMBOPLASTIN TIME PARTIAL: CPT

## 2018-07-30 PROCEDURE — 85610 PROTHROMBIN TIME: CPT

## 2018-07-30 PROCEDURE — 93005 ELECTROCARDIOGRAM TRACING: CPT | Performed by: PHYSICIAN ASSISTANT

## 2018-07-30 PROCEDURE — 93456 R HRT CORONARY ARTERY ANGIO: CPT | Performed by: INTERNAL MEDICINE

## 2018-07-30 PROCEDURE — 96374 THER/PROPH/DIAG INJ IV PUSH: CPT

## 2018-07-30 PROCEDURE — 93010 ELECTROCARDIOGRAM REPORT: CPT | Performed by: INTERNAL MEDICINE

## 2018-07-30 PROCEDURE — 36415 COLL VENOUS BLD VENIPUNCTURE: CPT

## 2018-07-30 PROCEDURE — 82810 BLOOD GASES O2 SAT ONLY: CPT

## 2018-07-30 PROCEDURE — 2500000003 HC RX 250 WO HCPCS

## 2018-07-30 PROCEDURE — 6360000002 HC RX W HCPCS

## 2018-07-30 PROCEDURE — 96360 HYDRATION IV INFUSION INIT: CPT

## 2018-07-30 PROCEDURE — C1894 INTRO/SHEATH, NON-LASER: HCPCS

## 2018-07-30 PROCEDURE — 86901 BLOOD TYPING SEROLOGIC RH(D): CPT

## 2018-07-30 PROCEDURE — 80048 BASIC METABOLIC PNL TOTAL CA: CPT

## 2018-07-30 PROCEDURE — 85027 COMPLETE CBC AUTOMATED: CPT

## 2018-07-30 PROCEDURE — 86900 BLOOD TYPING SEROLOGIC ABO: CPT

## 2018-07-30 PROCEDURE — 93458 L HRT ARTERY/VENTRICLE ANGIO: CPT | Performed by: INTERNAL MEDICINE

## 2018-07-30 PROCEDURE — C1769 GUIDE WIRE: HCPCS

## 2018-07-30 PROCEDURE — C1887 CATHETER, GUIDING: HCPCS

## 2018-07-30 PROCEDURE — 2709999900 HC NON-CHARGEABLE SUPPLY

## 2018-07-30 RX ORDER — GLUCOSAMINE/CHONDR SU A SOD 750-600 MG
20 TABLET ORAL NIGHTLY
Status: DISCONTINUED | OUTPATIENT
Start: 2018-07-30 | End: 2018-07-30 | Stop reason: RX

## 2018-07-30 RX ORDER — ALLOPURINOL 100 MG/1
100 TABLET ORAL NIGHTLY
Status: DISCONTINUED | OUTPATIENT
Start: 2018-07-30 | End: 2018-07-30 | Stop reason: HOSPADM

## 2018-07-30 RX ORDER — ONDANSETRON 2 MG/ML
4 INJECTION INTRAMUSCULAR; INTRAVENOUS EVERY 6 HOURS PRN
Status: DISCONTINUED | OUTPATIENT
Start: 2018-07-30 | End: 2018-07-30 | Stop reason: CLARIF

## 2018-07-30 RX ORDER — HYDRALAZINE HYDROCHLORIDE 50 MG/1
100 TABLET, FILM COATED ORAL 3 TIMES DAILY
Status: DISCONTINUED | OUTPATIENT
Start: 2018-07-30 | End: 2018-07-30

## 2018-07-30 RX ORDER — SODIUM CHLORIDE 0.9 % (FLUSH) 0.9 %
10 SYRINGE (ML) INJECTION PRN
Status: DISCONTINUED | OUTPATIENT
Start: 2018-07-30 | End: 2018-07-30 | Stop reason: HOSPADM

## 2018-07-30 RX ORDER — OXYBUTYNIN CHLORIDE 5 MG/1
10 TABLET ORAL DAILY
Status: DISCONTINUED | OUTPATIENT
Start: 2018-07-30 | End: 2018-07-30

## 2018-07-30 RX ORDER — SODIUM CHLORIDE 0.9 % (FLUSH) 0.9 %
10 SYRINGE (ML) INJECTION EVERY 12 HOURS SCHEDULED
Status: DISCONTINUED | OUTPATIENT
Start: 2018-07-30 | End: 2018-07-30 | Stop reason: HOSPADM

## 2018-07-30 RX ORDER — SODIUM CHLORIDE 9 MG/ML
INJECTION, SOLUTION INTRAVENOUS CONTINUOUS
Status: DISCONTINUED | OUTPATIENT
Start: 2018-07-30 | End: 2018-07-30 | Stop reason: HOSPADM

## 2018-07-30 RX ORDER — ASPIRIN 81 MG/1
81 TABLET, CHEWABLE ORAL DAILY
Status: DISCONTINUED | OUTPATIENT
Start: 2018-07-30 | End: 2018-07-30 | Stop reason: HOSPADM

## 2018-07-30 RX ORDER — LEVOTHYROXINE SODIUM 0.15 MG/1
150 TABLET ORAL DAILY
Status: DISCONTINUED | OUTPATIENT
Start: 2018-07-30 | End: 2018-07-30 | Stop reason: HOSPADM

## 2018-07-30 RX ORDER — LOSARTAN POTASSIUM 100 MG/1
100 TABLET ORAL DAILY
Status: DISCONTINUED | OUTPATIENT
Start: 2018-07-30 | End: 2018-07-30

## 2018-07-30 RX ORDER — HYDRALAZINE HYDROCHLORIDE 50 MG/1
100 TABLET, FILM COATED ORAL 3 TIMES DAILY
Status: DISCONTINUED | OUTPATIENT
Start: 2018-07-30 | End: 2018-07-30 | Stop reason: HOSPADM

## 2018-07-30 RX ORDER — FUROSEMIDE 40 MG/1
40 TABLET ORAL DAILY
Status: DISCONTINUED | OUTPATIENT
Start: 2018-07-30 | End: 2018-07-30 | Stop reason: HOSPADM

## 2018-07-30 RX ORDER — GLUCOSAMINE/CHONDR SU A SOD 750-600 MG
20 TABLET ORAL NIGHTLY
Status: DISCONTINUED | OUTPATIENT
Start: 2018-07-30 | End: 2018-07-30 | Stop reason: HOSPADM

## 2018-07-30 RX ORDER — GLUCOSAMINE/CHONDR SU A SOD 750-600 MG
20 TABLET ORAL NIGHTLY
Status: DISCONTINUED | OUTPATIENT
Start: 2018-07-30 | End: 2018-07-30

## 2018-07-30 RX ORDER — CLOPIDOGREL BISULFATE 75 MG/1
75 TABLET ORAL DAILY
Status: DISCONTINUED | OUTPATIENT
Start: 2018-07-30 | End: 2018-07-30 | Stop reason: HOSPADM

## 2018-07-30 RX ORDER — OXYBUTYNIN CHLORIDE 10 MG/1
10 TABLET, EXTENDED RELEASE ORAL NIGHTLY
Status: DISCONTINUED | OUTPATIENT
Start: 2018-07-30 | End: 2018-07-30 | Stop reason: HOSPADM

## 2018-07-30 RX ORDER — ISOSORBIDE MONONITRATE 60 MG/1
60 TABLET, EXTENDED RELEASE ORAL DAILY
Status: DISCONTINUED | OUTPATIENT
Start: 2018-07-30 | End: 2018-07-30 | Stop reason: HOSPADM

## 2018-07-30 RX ORDER — ACETAMINOPHEN 500 MG
500 TABLET ORAL EVERY 6 HOURS PRN
Status: DISCONTINUED | OUTPATIENT
Start: 2018-07-30 | End: 2018-07-30 | Stop reason: HOSPADM

## 2018-07-30 RX ORDER — ONDANSETRON 4 MG/1
4 TABLET, FILM COATED ORAL EVERY 6 HOURS PRN
Status: DISCONTINUED | OUTPATIENT
Start: 2018-07-30 | End: 2018-07-30 | Stop reason: HOSPADM

## 2018-07-30 RX ORDER — GLIPIZIDE 5 MG/1
5 TABLET ORAL DAILY
Status: DISCONTINUED | OUTPATIENT
Start: 2018-07-30 | End: 2018-07-30 | Stop reason: HOSPADM

## 2018-07-30 RX ORDER — METOPROLOL SUCCINATE 50 MG/1
50 TABLET, EXTENDED RELEASE ORAL DAILY
Status: DISCONTINUED | OUTPATIENT
Start: 2018-07-30 | End: 2018-07-30 | Stop reason: HOSPADM

## 2018-07-30 RX ORDER — LOSARTAN POTASSIUM 100 MG/1
100 TABLET ORAL NIGHTLY
Status: DISCONTINUED | OUTPATIENT
Start: 2018-07-30 | End: 2018-07-30 | Stop reason: HOSPADM

## 2018-07-30 RX ORDER — ALLOPURINOL 300 MG/1
300 TABLET ORAL DAILY
Status: DISCONTINUED | OUTPATIENT
Start: 2018-07-30 | End: 2018-07-30 | Stop reason: HOSPADM

## 2018-07-30 RX ORDER — FAMOTIDINE 20 MG/1
20 TABLET, FILM COATED ORAL DAILY PRN
Status: DISCONTINUED | OUTPATIENT
Start: 2018-07-30 | End: 2018-07-30 | Stop reason: HOSPADM

## 2018-07-30 RX ADMIN — ASPIRIN 81 MG: 81 TABLET, CHEWABLE ORAL at 06:32

## 2018-07-30 RX ADMIN — ALLOPURINOL 100 MG: 100 TABLET ORAL at 01:58

## 2018-07-30 RX ADMIN — FUROSEMIDE 40 MG: 40 TABLET ORAL at 13:51

## 2018-07-30 RX ADMIN — GLIPIZIDE 5 MG: 5 TABLET ORAL at 13:52

## 2018-07-30 RX ADMIN — HYDRALAZINE HYDROCHLORIDE 100 MG: 50 TABLET, FILM COATED ORAL at 01:58

## 2018-07-30 RX ADMIN — SODIUM CHLORIDE: 9 INJECTION, SOLUTION INTRAVENOUS at 00:38

## 2018-07-30 RX ADMIN — ALLOPURINOL 300 MG: 300 TABLET ORAL at 13:51

## 2018-07-30 RX ADMIN — CLOPIDOGREL BISULFATE 75 MG: 75 TABLET ORAL at 06:33

## 2018-07-30 RX ADMIN — ISOSORBIDE MONONITRATE 60 MG: 60 TABLET, EXTENDED RELEASE ORAL at 13:52

## 2018-07-30 RX ADMIN — METOPROLOL SUCCINATE 50 MG: 50 TABLET, EXTENDED RELEASE ORAL at 13:50

## 2018-07-30 RX ADMIN — LEVOTHYROXINE SODIUM 150 MCG: 0.15 TABLET ORAL at 06:32

## 2018-07-30 RX ADMIN — LOSARTAN POTASSIUM 100 MG: 100 TABLET ORAL at 01:59

## 2018-07-30 RX ADMIN — Medication 20 MG: at 01:59

## 2018-07-30 RX ADMIN — HYDRALAZINE HYDROCHLORIDE 100 MG: 50 TABLET, FILM COATED ORAL at 13:53

## 2018-07-30 RX ADMIN — OXYBUTYNIN CHLORIDE 10 MG: 10 TABLET, EXTENDED RELEASE ORAL at 02:00

## 2018-07-30 ASSESSMENT — PAIN SCALES - GENERAL
PAINLEVEL_OUTOF10: 0

## 2018-07-30 NOTE — PROGRESS NOTES
Laterality Date    ACHILLES TENDON SURGERY  3/2004    ANUS SURGERY  2010    BLADDER REPAIR  [id number]    CARDIAC SURGERY      heart cath    CATARACT REMOVAL Right 12/2007    CATARACT REMOVAL Left 1/2008    CORONARY ANGIOPLASTY WITH STENT PLACEMENT  2015    LAD, RCA    COSMETIC SURGERY  5/2008    eyelid    EKG 12-LEAD  4/25/2015         ENDOSCOPY, COLON, DIAGNOSTIC      EYE SURGERY      FINGER SURGERY Left 8/2010    left index    FOOT SURGERY  10/2004    HYSTERECTOMY  1978    JOINT REPLACEMENT      both knees    KNEE ARTHROPLASTY      R 2/98, L 10/98    LUMBAR SPINE SURGERY  3/4/05    REFRACTIVE SURGERY  2012    THYROIDECTOMY  1958    UPPER GASTROINTESTINAL ENDOSCOPY  2015    Dr. Wilkins Leaf           Allergies   Allergen Reactions    Atarax [Hydroxyzine] Other (See Comments)     Unsure of reaction     Biaxin [Clarithromycin]     Ceclor [Cefaclor] Other (See Comments)     Looney for 2 days     Oxycontin [Oxycodone] Other (See Comments)     dizziness    Tape [Adhesive Tape]      Tears skin    Tylenol With Codeine #3 [Acetaminophen-Codeine]     Ultram [Tramadol] Other (See Comments)     dizziness    Vioxx [Rofecoxib] Other (See Comments)     dizziness    Advil [Ibuprofen] Nausea And Vomiting and Other (See Comments)     Was told never to take    Bumex [Bumetanide] Nausea And Vomiting    Codeine Other (See Comments)     Inability to focus, glassed over eyes patient states    Darvocet A500 [Propoxyphene N-Acetaminophen] Nausea And Vomiting and Other (See Comments)     dizziness    Darvon [Fd&C Red #40-Fd&C Yellow #10-Propoxyphene] Nausea And Vomiting and Other (See Comments)     dizziness         MEDICATIONS   Coumadin Use Last 5 Days [x]No []Yes  Antiplatelet drug therapy use last 5 days  []No [x]Yes  Other anticoagulant use last 5 days  [x]No []Yes      No current facility-administered medications on file prior to encounter.       Current Outpatient Prescriptions on File Prior to undergo the planned procedure sedation and anesthesia. (Refer to nursing sedation/analgesia documentation record)  [x]Formulation and discussion of sedation/procedure plan, risks, and expectations with patient and/or responsible adult completed. [x]Patient examined immediately prior to the procedure.  (Refer to nursing sedation/analgesia documentation record)        Ashley Finley MD, [facility], [facility], FCCP  Electronically signed 7/30/2018 at 10:26 AM

## 2018-07-30 NOTE — CARE COORDINATION
7/30/18, 9:16 AM    Discharge plan discussed by  and . Discharge plan reviewed with patient/ family. Patient/ family verbalize understanding of discharge plan and are in agreement with plan. Understanding was demonstrated using the teach back method. Pt here with one of her daughters for prehydration preparation for Cardiac Cath scheduled for 10 am today. Pt from home where another daughter lives with her and assists her with transportation. Pt has no current services and denies need for such at discharge when offered Help per this CM RN. Pt does use a rolling seated walker. Potential discharge in next 24 hours.

## 2018-07-30 NOTE — PROGRESS NOTES
Discharge teaching and instructions for diagnosis/procedure of heart cath completed with patient using teachback method. AVS reviewed. Printed prescriptions given to patient. Patient voiced understanding regarding prescriptions, follow up appointments, and care of self at home. Discharged in a wheelchair to  home with support per family. All questions answered.

## 2018-07-30 NOTE — DISCHARGE INSTR - DIET
serving sizes. Limit salt/sodium. Choose and prepare foods with little or no salt. Use pepper or Mrs. Dash for added flavor. Limit high sodium foods like canned soups, processed meats and cheeses, and salty snack foods. Limit beverages and food with added sugars (such as regular pop, sweetened iced tea, desserts and sweets). These foods are loaded with calories but provide very little nutrients. If you drink alcohol, drink in moderation. Limit alcohol intake to 2 drinks a day for men and 1 drink a day for women.  Check with your Doctor first.

## 2018-07-30 NOTE — OP NOTE
[facility] Post Sedation [facility]        Pt Name: Michael Leger  MRN: [id number]  YOB: 1931  Procedure Performed By: Andria Mccray MD, [facility]  Primary Care Physician: Shana Carbajal MD        POST-PROCEDURE    Physicians/Assistants: Andria Mccray MD, FAC, [facility], [facility]    Procedure Performed:  Left heart cath                                  Sedation/Anesthesia:  Local Anesthesia and IV Conscious Sedation with continuous O2 monitoring    Estimated Blood Loss: 10 cc     Specimens Removed:  [x]None []Other:      Disposition of Specimen:  []Pathology []Other        Complications:   [x]None Immediate []Other:       Post Procedure Diagnosis/Findings:  Coronary Artery Disease          Recommendations:  Medical treatment and review films.                Andria Mccray MD, CARMELO Klein  Electronically signed 7/30/2018 at 2:03 PM

## 2018-08-03 LAB
ALBUMIN SERPL-MCNC: 4.2 G/DL
ALP BLD-CCNC: 93 U/L
ALT SERPL-CCNC: 6 U/L
ANION GAP SERPL CALCULATED.3IONS-SCNC: 14 MMOL/L
AST SERPL-CCNC: 16 U/L
BILIRUB SERPL-MCNC: 0.5 MG/DL (ref 0.1–1.4)
BUN BLDV-MCNC: 56 MG/DL
CALCIUM SERPL-MCNC: 8.9 MG/DL
CHLORIDE BLD-SCNC: 103 MMOL/L
CO2: NORMAL MMOL/L
CREAT SERPL-MCNC: 2 MG/DL
GFR CALCULATED: NORMAL
GLUCOSE BLD-MCNC: 113 MG/DL
POTASSIUM SERPL-SCNC: 4.9 MMOL/L
SODIUM BLD-SCNC: 143 MMOL/L
TOTAL PROTEIN: 6.7

## 2018-08-06 DIAGNOSIS — M1A.9XX1 CHRONIC TOPHACEOUS GOUT: ICD-10-CM

## 2018-08-06 RX ORDER — ALLOPURINOL 100 MG/1
100 TABLET ORAL NIGHTLY
Qty: 90 TABLET | Refills: 1 | Status: SHIPPED | OUTPATIENT
Start: 2018-08-06 | End: 2018-01-01 | Stop reason: DRUGHIGH

## 2018-08-06 RX ORDER — ALLOPURINOL 300 MG/1
300 TABLET ORAL DAILY
Qty: 90 TABLET | Refills: 1 | Status: ON HOLD | OUTPATIENT
Start: 2018-08-06 | End: 2018-08-31 | Stop reason: HOSPADM

## 2018-08-06 NOTE — PROGRESS NOTES
1. Chronic tophaceous gout  - allopurinol (ZYLOPRIM) 100 MG tablet; Take 1 tablet by mouth nightly  Dispense: 90 tablet; Refill: 1  - allopurinol (ZYLOPRIM) 300 MG tablet; Take 1 tablet by mouth daily  Dispense: 90 tablet;  Refill: 1   -  Uric acid 3.3 mg/dL , creatine stable at 2

## 2018-08-15 ENCOUNTER — OFFICE VISIT (OUTPATIENT)
Dept: CARDIOLOGY CLINIC | Age: 83
End: 2018-08-15
Payer: MEDICARE

## 2018-08-15 VITALS
WEIGHT: 191.38 LBS | DIASTOLIC BLOOD PRESSURE: 64 MMHG | HEART RATE: 72 BPM | BODY MASS INDEX: 33.91 KG/M2 | HEIGHT: 63 IN | SYSTOLIC BLOOD PRESSURE: 142 MMHG

## 2018-08-15 DIAGNOSIS — Z95.820 S/P ANGIOPLASTY WITH STENT: ICD-10-CM

## 2018-08-15 DIAGNOSIS — I25.10 CORONARY ARTERY DISEASE INVOLVING NATIVE CORONARY ARTERY OF NATIVE HEART WITHOUT ANGINA PECTORIS: ICD-10-CM

## 2018-08-15 DIAGNOSIS — I10 ESSENTIAL HYPERTENSION: ICD-10-CM

## 2018-08-15 DIAGNOSIS — E78.5 DYSLIPIDEMIA: ICD-10-CM

## 2018-08-15 DIAGNOSIS — R06.02 SOB (SHORTNESS OF BREATH): Primary | ICD-10-CM

## 2018-08-15 PROCEDURE — G8417 CALC BMI ABV UP PARAM F/U: HCPCS | Performed by: PHYSICIAN ASSISTANT

## 2018-08-15 PROCEDURE — 99213 OFFICE O/P EST LOW 20 MIN: CPT | Performed by: PHYSICIAN ASSISTANT

## 2018-08-15 PROCEDURE — G8598 ASA/ANTIPLAT THER USED: HCPCS | Performed by: PHYSICIAN ASSISTANT

## 2018-08-15 PROCEDURE — 4040F PNEUMOC VAC/ADMIN/RCVD: CPT | Performed by: PHYSICIAN ASSISTANT

## 2018-08-15 PROCEDURE — G8427 DOCREV CUR MEDS BY ELIG CLIN: HCPCS | Performed by: PHYSICIAN ASSISTANT

## 2018-08-15 PROCEDURE — 1123F ACP DISCUSS/DSCN MKR DOCD: CPT | Performed by: PHYSICIAN ASSISTANT

## 2018-08-15 PROCEDURE — 1101F PT FALLS ASSESS-DOCD LE1/YR: CPT | Performed by: PHYSICIAN ASSISTANT

## 2018-08-15 PROCEDURE — 1036F TOBACCO NON-USER: CPT | Performed by: PHYSICIAN ASSISTANT

## 2018-08-15 PROCEDURE — 1090F PRES/ABSN URINE INCON ASSESS: CPT | Performed by: PHYSICIAN ASSISTANT

## 2018-08-15 NOTE — PROGRESS NOTES
 aspirin 81 MG tablet Take 81 mg by mouth daily. No current facility-administered medications for this visit. Social History     Social History    Marital status:      Spouse name: N/A    Number of children: 3    Years of education: N/A     Occupational History    retired      Social History Main Topics    Smoking status: Never Smoker    Smokeless tobacco: Never Used    Alcohol use No    Drug use: No    Sexual activity: Not Asked     Other Topics Concern    None     Social History Narrative    None       Family History   Problem Relation Age of Onset    Cancer Mother         breast    Heart Disease Mother         murmur    Arthritis Mother         rheumatism    Arthritis Father         rheumatism    Cancer Brother         jaw    Diabetes Neg Hx     High Blood Pressure Neg Hx     Stroke Neg Hx        Blood pressure (!) 142/64, pulse 72, height 5' 3\" (1.6 m), weight 191 lb 6 oz (86.8 kg), not currently breastfeeding. General:   Well developed, well nourished  Lungs:   Clear to auscultation  Heart:    Normal S1 S2, No murmur, rubs, or gallops  Abdomen:   Soft, non tender, no organomegalies, positive bowel sounds  Extremities:   No edema, no cyanosis, good peripheral pulses  Neurological:   Awake, alert, oriented. No obvious focal deficits  Musculoskeletal:  No obvious deformities       Cardiac catheterization 7/30/2018  Mild disease of the LAD/RCA/left circumflex   Patent LAD and RCA stents  Ostial D1 and D2 with 80% blockage, small size vessels  Mild-to-moderate pulmonary hypertension     Diagnosis Orders   1. SOB (shortness of breath)  FULL PFT STUDY WITH PRE AND POST   2. Coronary artery disease involving native coronary artery of native heart without angina pectoris     3. Essential hypertension     4. Dyslipidemia     5.  S/P angioplasty with stent         Orders Placed This Encounter   Procedures    FULL PFT STUDY WITH PRE AND POST     Standing Status:   Future Standing Expiration Date:   8/15/2019     Continue Dr Campbell Suárez current treatment plan    I discussed referral to pulmonary medicine but patient would like to have pulmonary function test and follow-up with her family doctor Dr. Chauncey Boyce. Continue same current medications and with constant vigilance to changes in symptoms and also any potential side effects. Return for care if become worse or seek medical attention immediately. The patient is educated on symptoms of heart disease that include chest pain and passing out, dizziness, etc and to report them if there is any change or go to emergency room.        Follow up with Dr Leo Arboleda as scheduled or sooner if needed  (Please note that portions of this note were completed with a voice recognition program.  Efforts were made to edit the dictation but occasionally words are mis-transcribed.)      Gloria Huang PA-C

## 2018-08-15 NOTE — PROGRESS NOTES
Former Gustavo Calderon Pt here for 2 week fu [facility] cath no stent    Pt continues sob all the time, more weak, swelling in bilateral legs and feet, heart palpitations    Pt denies dizziness, chest pain

## 2018-08-16 RX ORDER — RANOLAZINE 500 MG/1
500 TABLET, EXTENDED RELEASE ORAL 2 TIMES DAILY
Qty: 56 TABLET | Refills: 3 | COMMUNITY
Start: 2018-08-16 | End: 2018-10-30

## 2018-08-17 LAB
ALBUMIN SERPL-MCNC: 4 G/DL
ALP BLD-CCNC: 74 U/L
ALT SERPL-CCNC: 6 U/L
ANION GAP SERPL CALCULATED.3IONS-SCNC: 18 MMOL/L
AST SERPL-CCNC: 17 U/L
BILIRUB SERPL-MCNC: 0.5 MG/DL (ref 0.1–1.4)
BUN BLDV-MCNC: 88 MG/DL
CALCIUM SERPL-MCNC: 9.9 MG/DL
CHLORIDE BLD-SCNC: 99 MMOL/L
CO2: 23 MMOL/L
CREAT SERPL-MCNC: 2.8 MG/DL
GFR CALCULATED: NORMAL
GLUCOSE BLD-MCNC: 103 MG/DL
POTASSIUM SERPL-SCNC: 5 MMOL/L
SODIUM BLD-SCNC: 140 MMOL/L
TOTAL PROTEIN: 6.6
URIC ACID: 3.7

## 2018-08-20 ENCOUNTER — TELEPHONE (OUTPATIENT)
Dept: RHEUMATOLOGY | Age: 83
End: 2018-08-20

## 2018-08-21 ENCOUNTER — TELEPHONE (OUTPATIENT)
Dept: RHEUMATOLOGY | Age: 83
End: 2018-08-21

## 2018-08-21 DIAGNOSIS — R79.89 CREATININE ELEVATION: Primary | ICD-10-CM

## 2018-08-23 ENCOUNTER — HOSPITAL ENCOUNTER (OUTPATIENT)
Dept: PULMONOLOGY | Age: 83
Discharge: HOME | End: 2018-08-23
Payer: MEDICARE

## 2018-08-23 DIAGNOSIS — R06.02 SOB (SHORTNESS OF BREATH): ICD-10-CM

## 2018-08-23 PROCEDURE — 94010 BREATHING CAPACITY TEST: CPT

## 2018-08-23 PROCEDURE — 94726 PLETHYSMOGRAPHY LUNG VOLUMES: CPT

## 2018-08-27 ENCOUNTER — HOSPITAL ENCOUNTER (INPATIENT)
Age: 83
LOS: 4 days | Discharge: HOME | DRG: 189 | End: 2018-08-31
Attending: FAMILY MEDICINE | Admitting: HOSPITALIST
Payer: MEDICARE

## 2018-08-27 ENCOUNTER — APPOINTMENT (OUTPATIENT)
Dept: NUCLEAR MEDICINE | Age: 83
DRG: 189 | End: 2018-08-27
Payer: MEDICARE

## 2018-08-27 ENCOUNTER — APPOINTMENT (OUTPATIENT)
Dept: CT IMAGING | Age: 83
DRG: 189 | End: 2018-08-27
Payer: MEDICARE

## 2018-08-27 ENCOUNTER — APPOINTMENT (OUTPATIENT)
Dept: GENERAL RADIOLOGY | Age: 83
DRG: 189 | End: 2018-08-27
Payer: MEDICARE

## 2018-08-27 DIAGNOSIS — R91.1 LUNG NODULE: Primary | ICD-10-CM

## 2018-08-27 LAB
ALBUMIN SERPL-MCNC: 4 G/DL (ref 3.5–5.1)
ALLEN TEST: POSITIVE
ALLEN TEST: POSITIVE
ALP BLD-CCNC: 66 U/L (ref 38–126)
ALT SERPL-CCNC: < 5 U/L (ref 11–66)
ANION GAP SERPL CALCULATED.3IONS-SCNC: 21 MEQ/L (ref 8–16)
AST SERPL-CCNC: 12 U/L (ref 5–40)
BASE EXCESS (CALCULATED): -1.4 MMOL/L (ref -2.5–2.5)
BASE EXCESS (CALCULATED): -1.9 MMOL/L (ref -2.5–2.5)
BASOPHILS # BLD: 0.3 %
BASOPHILS ABSOLUTE: 0 THOU/MM3 (ref 0–0.1)
BILIRUB SERPL-MCNC: 0.9 MG/DL (ref 0.3–1.2)
BILIRUBIN DIRECT: < 0.2 MG/DL (ref 0–0.3)
BUN BLDV-MCNC: 96 MG/DL (ref 7–22)
CALCIUM SERPL-MCNC: 9.8 MG/DL (ref 8.5–10.5)
CHLORIDE BLD-SCNC: 100 MEQ/L (ref 98–111)
CO2: 20 MEQ/L (ref 23–33)
COLLECTED BY:: ABNORMAL
COLLECTED BY:: NORMAL
CREAT SERPL-MCNC: 2.3 MG/DL (ref 0.4–1.2)
DEVICE: ABNORMAL
DEVICE: NORMAL
EKG ATRIAL RATE: 62 BPM
EKG P AXIS: 81 DEGREES
EKG P-R INTERVAL: 302 MS
EKG Q-T INTERVAL: 432 MS
EKG QRS DURATION: 134 MS
EKG QTC CALCULATION (BAZETT): 438 MS
EKG R AXIS: -56 DEGREES
EKG T AXIS: 91 DEGREES
EKG VENTRICULAR RATE: 62 BPM
EOSINOPHIL # BLD: 1 %
EOSINOPHILS ABSOLUTE: 0.1 THOU/MM3 (ref 0–0.4)
ERYTHROCYTE [DISTWIDTH] IN BLOOD BY AUTOMATED COUNT: 14.1 % (ref 11.5–14.5)
ERYTHROCYTE [DISTWIDTH] IN BLOOD BY AUTOMATED COUNT: 49.3 FL (ref 35–45)
GFR SERPL CREATININE-BSD FRML MDRD: 20 ML/MIN/1.73M2
GLUCOSE BLD-MCNC: 155 MG/DL (ref 70–108)
GLUCOSE BLD-MCNC: 156 MG/DL (ref 70–108)
HCO3: 17 MMOL/L (ref 23–28)
HCO3: 24 MMOL/L (ref 23–28)
HCT VFR BLD CALC: 35 % (ref 37–47)
HEMOGLOBIN: 11.7 GM/DL (ref 12–16)
IFIO2: 4
IMMATURE GRANS (ABS): 0.03 THOU/MM3 (ref 0–0.07)
IMMATURE GRANULOCYTES: 0.3 %
LACTIC ACID, SEPSIS: 2.1 MMOL/L (ref 0.5–1.9)
LIPASE: 145.2 U/L (ref 5.6–51.3)
LYMPHOCYTES # BLD: 4.3 %
LYMPHOCYTES ABSOLUTE: 0.4 THOU/MM3 (ref 1–4.8)
MCH RBC QN AUTO: 32.2 PG (ref 26–33)
MCHC RBC AUTO-ENTMCNC: 33.4 GM/DL (ref 32.2–35.5)
MCV RBC AUTO: 96.4 FL (ref 81–99)
MONOCYTES # BLD: 7.9 %
MONOCYTES ABSOLUTE: 0.8 THOU/MM3 (ref 0.4–1.3)
NUCLEATED RED BLOOD CELLS: 0 /100 WBC
O2 SATURATION: 96 %
O2 SATURATION: 99 %
OSMOLALITY CALCULATION: 314.2 MOSMOL/KG (ref 275–300)
PCO2: 17 MMHG (ref 35–45)
PCO2: 42 MMHG (ref 35–45)
PH BLOOD GAS: 7.36 (ref 7.35–7.45)
PH BLOOD GAS: 7.61 (ref 7.35–7.45)
PLATELET # BLD: 240 THOU/MM3 (ref 130–400)
PMV BLD AUTO: 12.2 FL (ref 9.4–12.4)
PO2: 84 MMHG (ref 71–104)
PO2: 97 MMHG (ref 71–104)
POTASSIUM REFLEX MAGNESIUM: 4.1 MEQ/L (ref 3.5–5.2)
PRO-BNP: 757.9 PG/ML (ref 0–1800)
PROCALCITONIN: 0.08 NG/ML (ref 0.01–0.09)
RBC # BLD: 3.63 MILL/MM3 (ref 4.2–5.4)
SEG NEUTROPHILS: 86.2 %
SEGMENTED NEUTROPHILS ABSOLUTE COUNT: 8.3 THOU/MM3 (ref 1.8–7.7)
SODIUM BLD-SCNC: 141 MEQ/L (ref 135–145)
SOURCE, BLOOD GAS: ABNORMAL
SOURCE, BLOOD GAS: NORMAL
TOTAL PROTEIN: 7.4 G/DL (ref 6.1–8)
TROPONIN T: 0.04 NG/ML
TROPONIN T: 0.05 NG/ML
WBC # BLD: 9.6 THOU/MM3 (ref 4.8–10.8)

## 2018-08-27 PROCEDURE — 2580000003 HC RX 258: Performed by: FAMILY MEDICINE

## 2018-08-27 PROCEDURE — 2709999900 HC NON-CHARGEABLE SUPPLY

## 2018-08-27 PROCEDURE — 3430000000 HC RX DIAGNOSTIC RADIOPHARMACEUTICAL: Performed by: PHYSICIAN ASSISTANT

## 2018-08-27 PROCEDURE — 93005 ELECTROCARDIOGRAM TRACING: CPT | Performed by: FAMILY MEDICINE

## 2018-08-27 PROCEDURE — A9540 TC99M MAA: HCPCS | Performed by: PHYSICIAN ASSISTANT

## 2018-08-27 PROCEDURE — 36600 WITHDRAWAL OF ARTERIAL BLOOD: CPT

## 2018-08-27 PROCEDURE — 36415 COLL VENOUS BLD VENIPUNCTURE: CPT

## 2018-08-27 PROCEDURE — 83605 ASSAY OF LACTIC ACID: CPT

## 2018-08-27 PROCEDURE — 83880 ASSAY OF NATRIURETIC PEPTIDE: CPT

## 2018-08-27 PROCEDURE — 83690 ASSAY OF LIPASE: CPT

## 2018-08-27 PROCEDURE — 74176 CT ABD & PELVIS W/O CONTRAST: CPT

## 2018-08-27 PROCEDURE — A9558 XE133 XENON 10MCI: HCPCS | Performed by: PHYSICIAN ASSISTANT

## 2018-08-27 PROCEDURE — 82803 BLOOD GASES ANY COMBINATION: CPT

## 2018-08-27 PROCEDURE — 85025 COMPLETE CBC W/AUTO DIFF WBC: CPT

## 2018-08-27 PROCEDURE — 96374 THER/PROPH/DIAG INJ IV PUSH: CPT

## 2018-08-27 PROCEDURE — 99285 EMERGENCY DEPT VISIT HI MDM: CPT

## 2018-08-27 PROCEDURE — 82948 REAGENT STRIP/BLOOD GLUCOSE: CPT

## 2018-08-27 PROCEDURE — 80076 HEPATIC FUNCTION PANEL: CPT

## 2018-08-27 PROCEDURE — 78582 LUNG VENTILAT&PERFUS IMAGING: CPT

## 2018-08-27 PROCEDURE — 71045 X-RAY EXAM CHEST 1 VIEW: CPT

## 2018-08-27 PROCEDURE — 6360000002 HC RX W HCPCS: Performed by: PHYSICIAN ASSISTANT

## 2018-08-27 PROCEDURE — 94640 AIRWAY INHALATION TREATMENT: CPT

## 2018-08-27 PROCEDURE — 84145 PROCALCITONIN (PCT): CPT

## 2018-08-27 PROCEDURE — 84484 ASSAY OF TROPONIN QUANT: CPT

## 2018-08-27 PROCEDURE — 1200000003 HC TELEMETRY R&B

## 2018-08-27 PROCEDURE — 93010 ELECTROCARDIOGRAM REPORT: CPT | Performed by: INTERNAL MEDICINE

## 2018-08-27 PROCEDURE — 80048 BASIC METABOLIC PNL TOTAL CA: CPT

## 2018-08-27 PROCEDURE — 87040 BLOOD CULTURE FOR BACTERIA: CPT

## 2018-08-27 RX ORDER — ACETAMINOPHEN 325 MG/1
650 TABLET ORAL EVERY 4 HOURS PRN
Status: DISCONTINUED | OUTPATIENT
Start: 2018-08-27 | End: 2018-08-31 | Stop reason: HOSPADM

## 2018-08-27 RX ORDER — HEPARIN SODIUM 5000 [USP'U]/ML
5000 INJECTION, SOLUTION INTRAVENOUS; SUBCUTANEOUS EVERY 8 HOURS
Status: DISCONTINUED | OUTPATIENT
Start: 2018-08-28 | End: 2018-08-31 | Stop reason: HOSPADM

## 2018-08-27 RX ORDER — 0.9 % SODIUM CHLORIDE 0.9 %
500 INTRAVENOUS SOLUTION INTRAVENOUS ONCE
Status: COMPLETED | OUTPATIENT
Start: 2018-08-27 | End: 2018-08-28

## 2018-08-27 RX ORDER — LORAZEPAM 2 MG/ML
0.5 INJECTION INTRAMUSCULAR ONCE
Status: COMPLETED | OUTPATIENT
Start: 2018-08-27 | End: 2018-08-27

## 2018-08-27 RX ORDER — ONDANSETRON 4 MG/1
4 TABLET, ORALLY DISINTEGRATING ORAL ONCE
Status: COMPLETED | OUTPATIENT
Start: 2018-08-27 | End: 2018-08-27

## 2018-08-27 RX ORDER — SODIUM CHLORIDE 0.9 % (FLUSH) 0.9 %
10 SYRINGE (ML) INJECTION EVERY 12 HOURS SCHEDULED
Status: DISCONTINUED | OUTPATIENT
Start: 2018-08-28 | End: 2018-08-31 | Stop reason: HOSPADM

## 2018-08-27 RX ORDER — XENON XE-133 10 MCI/1
12.7 GAS RESPIRATORY (INHALATION)
Status: COMPLETED | OUTPATIENT
Start: 2018-08-27 | End: 2018-08-27

## 2018-08-27 RX ORDER — SODIUM CHLORIDE 0.9 % (FLUSH) 0.9 %
10 SYRINGE (ML) INJECTION PRN
Status: DISCONTINUED | OUTPATIENT
Start: 2018-08-27 | End: 2018-08-31 | Stop reason: HOSPADM

## 2018-08-27 RX ORDER — ONDANSETRON 2 MG/ML
4 INJECTION INTRAMUSCULAR; INTRAVENOUS EVERY 6 HOURS PRN
Status: DISCONTINUED | OUTPATIENT
Start: 2018-08-27 | End: 2018-08-28 | Stop reason: CLARIF

## 2018-08-27 RX ADMIN — XENON XE-133 12.7 MILLICURIE: 10 GAS RESPIRATORY (INHALATION) at 19:37

## 2018-08-27 RX ADMIN — ONDANSETRON 4 MG: 4 TABLET, ORALLY DISINTEGRATING ORAL at 17:22

## 2018-08-27 RX ADMIN — ALBUTEROL SULFATE 2.5 MG: 2.5 SOLUTION RESPIRATORY (INHALATION) at 16:55

## 2018-08-27 RX ADMIN — Medication 6 MILLICURIE: at 19:42

## 2018-08-27 RX ADMIN — LORAZEPAM 0.5 MG: 2 INJECTION INTRAMUSCULAR; INTRAVENOUS at 17:33

## 2018-08-27 RX ADMIN — SODIUM CHLORIDE 500 ML: 9 INJECTION, SOLUTION INTRAVENOUS at 22:18

## 2018-08-27 ASSESSMENT — ENCOUNTER SYMPTOMS
SORE THROAT: 0
ABDOMINAL PAIN: 0
EYE DISCHARGE: 0
CONSTIPATION: 0
VOMITING: 1
EYE PAIN: 0
WHEEZING: 0
BACK PAIN: 0
DIARRHEA: 0
SHORTNESS OF BREATH: 1
NAUSEA: 1
COUGH: 1
RHINORRHEA: 0

## 2018-08-27 ASSESSMENT — PAIN SCALES - GENERAL: PAINLEVEL_OUTOF10: 0

## 2018-08-27 NOTE — ED NOTES
Patient at  Bedside hyperventilating at this time encouraged to slow breathing and breath in nose and out mouth      Yovani Weir RN  08/27/18 [id number]

## 2018-08-27 NOTE — ED TRIAGE NOTES
Patient presents to ED by EMS with complaints of nausea and vomitting this has been going on for 3 days. Patient states she has also been short of breath the last three days. Patient blood sugar 139 in squad. EMS reports patient in a-fib, patient states she has a history of it. EKG perfomred in ED.

## 2018-08-27 NOTE — ED PROVIDER NOTES
EXAM     INITIAL VITALS:  height is 5' 3\" (1.6 m) and weight is 184 lb 8 oz (83.7 kg). Her oral temperature is 97 °F (36.1 °C). Her blood pressure is 140/96 (abnormal) and her pulse is 82. Her respiration is 28 and oxygen saturation is 92%. Physical Exam   Constitutional: She is oriented to person, place, and time. She appears well-developed and well-nourished. Non-toxic appearance. obese   HENT:   Head: Normocephalic and atraumatic. Right Ear: Tympanic membrane and external ear normal.   Left Ear: Tympanic membrane and external ear normal.   Nose: Nose normal.   Mouth/Throat: Oropharynx is clear and moist and mucous membranes are normal. No oropharyngeal exudate, posterior oropharyngeal edema or posterior oropharyngeal erythema. Mallampati II   Eyes: Pupils are equal, round, and reactive to light. Conjunctivae and EOM are normal. Right eye exhibits no discharge. Left eye exhibits no discharge. No scleral icterus. Neck: Normal range of motion, full passive range of motion without pain and phonation normal. Neck supple. No JVD present. Carotid bruit is not present. No tracheal deviation present. Cardiovascular: Normal rate, normal heart sounds, intact distal pulses and normal pulses. An irregularly irregular rhythm present. Exam reveals no gallop and no friction rub. No murmur heard. Pulses:       Radial pulses are 2+ on the right side, and 2+ on the left side. Dorsalis pedis pulses are 2+ on the right side, and 2+ on the left side. Posterior tibial pulses are 2+ on the right side, and 2+ on the left side. No LE edema bilat. Pulmonary/Chest: Effort normal. No stridor. Tachypnea noted. She has no decreased breath sounds. She has wheezes (mild expiratory in bilateral lower fields). She has no rhonchi. She has no rales. Abdominal: Soft. Bowel sounds are normal. She exhibits no distension. There is no tenderness. There is no rebound, no guarding and no CVA tenderness. Musculoskeletal: Normal range of motion. She exhibits no edema. Lymphadenopathy:     She has no cervical adenopathy. Neurological: She is alert and oriented to person, place, and time. No cranial nerve deficit. She exhibits normal muscle tone. Coordination normal.   Skin: Skin is warm and dry. No rash noted. She is not diaphoretic. Psychiatric: Her mood appears anxious. Nursing note and vitals reviewed. DIFFERENTIAL DIAGNOSIS:   Pneumonia, RAD, ACS, PE, metabolic acidosis, anemia, CHF, anxiety    DIAGNOSTIC RESULTS     EKG: All EKG's are interpreted by the Emergency Department Physician who either signs or Co-signs this chart in the absence of a cardiologist.    Sinus rhythm with 1st degree AV block with occasional PVC's   LAD  No stemi      RADIOLOGY: non-plain film images(s) such as CT, Ultrasound and MRI are read by the radiologist.  Plain radiographic images are visualized and preliminarily interpreted by the emergency physician unless otherwise stated below. XR CHEST PORTABLE   Final Result   No acute findings               **This report has been created using voice recognition software. It may contain minor errors which are inherent in voice recognition technology. **      Final report electronically signed by Dr. Zhou Gavin on 8/27/2018 5:18 PM      CT ABDOMEN PELVIS WO CONTRAST Additional Contrast? None    (Results Pending)   NM LUNG SCAN PERFUSION ONLY    (Results Pending)       LABS:   Labs Reviewed   CBC WITH AUTO DIFFERENTIAL - Abnormal; Notable for the following:        Result Value    RBC 3.63 (*)     Hemoglobin 11.7 (*)     Hematocrit 35.0 (*)     RDW-SD 49.3 (*)     Segs Absolute 8.3 (*)     Lymphocytes # 0.4 (*)     All other components within normal limits   BASIC METABOLIC PANEL W/ REFLEX TO MG FOR LOW K  - Abnormal; Notable for the following:     CO2 20 (*)     Glucose 155 (*)     BUN 96 (*)     CREATININE 2.3 (*)     All other components within normal limits   HEPATIC stemi. Patient given albuterol neb with improvement in wheezing but pt still feeling short of breath and hyperventilating. zofran given helped with nausea. CXR showed no acute findings. Initial abg suggestive of respiratory alkalosis. Consulted and discussed case with my attending Dr. Janee Jeffrey who also evaluated patient and corroborated patient managment. Pt given ativan with much improvement. Respirations unlabored and normal rate on reevaluation. Repeat abg normal. Labs ordered. Cr 2.3 (last 2.8 on 8/17/18). Troponin elevated 0.047. 2 hr Repeat troponin ordered. Nursing staff reports pt sao2 dropping to 77% when sleeping, so pt placed on 4L NC and currently at 98%. Suspect DAE. Consulted with Dr. Lisa Damon, radiology, who approved VQ scan for Low suspicion for PE. Patient care was transferred to Dr. Janee Jeffrey at the end of my shift pending Ct abdomen, VQ scan, and repeat troponin results. See his note for results, final impression and disposition. CRITICAL CARE:   None    CONSULTS:  Dr. Lisa Damon - Radiology    PROCEDURES:  None    FINAL IMPRESSION    1. Shortness of breath  2. Elevated troponin  3. Elevated lipase  4. Nausea and vomiting      DISPOSITION/PLAN   Patient care was transferred to Dr. Janee Jeffrey at the end of my shift pending Ct abdomen, VQ scan, and repeat troponin results. See his note for results, final impression and disposition. (Please note that portions of this note were completed with a voice recognition program.  Efforts were made to edit the dictations but occasionally words are mis-transcribed.)    Neal Mcleod PA-C    Scribe:  Yonis Mcneill 8/27/18 4:32 PM Scribing for and in the presence of Neal Mcleod PA-C.     Signed by: Andrews Purdy, 08/27/18 6:47 PM    Provider:  I personally performed the services described in the documentation, reviewed and edited the documentation which was dictated to the scribe in my presence, and it accurately records my words and actions.     Jada eMjia PA-C 08/27/18 6:47 PM          Jada Mejia PA-C  08/28/18 [id number]

## 2018-08-28 PROBLEM — N18.30 ACUTE RENAL FAILURE SUPERIMPOSED ON STAGE 3 CHRONIC KIDNEY DISEASE (HCC): Status: ACTIVE | Noted: 2018-08-28

## 2018-08-28 PROBLEM — I50.32 CHRONIC DIASTOLIC CONGESTIVE HEART FAILURE (HCC): Status: ACTIVE | Noted: 2018-08-28

## 2018-08-28 PROBLEM — E44.0 MODERATE MALNUTRITION (HCC): Status: ACTIVE | Noted: 2018-08-28

## 2018-08-28 PROBLEM — J96.01 ACUTE RESPIRATORY FAILURE WITH HYPOXIA (HCC): Status: ACTIVE | Noted: 2018-08-28

## 2018-08-28 PROBLEM — N17.9 ACUTE RENAL FAILURE SUPERIMPOSED ON STAGE 3 CHRONIC KIDNEY DISEASE (HCC): Status: ACTIVE | Noted: 2018-08-28

## 2018-08-28 PROBLEM — E66.9 CLASS 1 OBESITY IN ADULT: Status: ACTIVE | Noted: 2018-08-28

## 2018-08-28 LAB
ALBUMIN SERPL-MCNC: 3.5 G/DL (ref 3.5–5.1)
ALP BLD-CCNC: 56 U/L (ref 38–126)
ALT SERPL-CCNC: < 5 U/L (ref 11–66)
AMPHETAMINE+METHAMPHETAMINE URINE SCREEN: NEGATIVE
ANION GAP SERPL CALCULATED.3IONS-SCNC: 14 MEQ/L (ref 8–16)
AST SERPL-CCNC: 11 U/L (ref 5–40)
AVERAGE GLUCOSE: 126 MG/DL (ref 70–126)
BACTERIA: ABNORMAL /HPF
BARBITURATE QUANTITATIVE URINE: NEGATIVE
BASOPHILS # BLD: 0.4 %
BASOPHILS ABSOLUTE: 0 THOU/MM3 (ref 0–0.1)
BENZODIAZEPINE QUANTITATIVE URINE: NEGATIVE
BILIRUB SERPL-MCNC: 0.7 MG/DL (ref 0.3–1.2)
BILIRUBIN URINE: NEGATIVE
BLOOD, URINE: NEGATIVE
BUN BLDV-MCNC: 93 MG/DL (ref 7–22)
CALCIUM SERPL-MCNC: 9 MG/DL (ref 8.5–10.5)
CANNABINOID QUANTITATIVE URINE: NEGATIVE
CASTS 2: ABNORMAL /LPF
CASTS UA: ABNORMAL /LPF
CHARACTER, URINE: CLEAR
CHLORIDE BLD-SCNC: 104 MEQ/L (ref 98–111)
CHOLESTEROL, TOTAL: 202 MG/DL (ref 100–199)
CO2: 24 MEQ/L (ref 23–33)
COCAINE METABOLITE QUANTITATIVE URINE: NEGATIVE
COLOR: YELLOW
CREAT SERPL-MCNC: 2.4 MG/DL (ref 0.4–1.2)
CRYSTALS, UA: ABNORMAL
D-DIMER QUANTITATIVE: 1067 NG/ML FEU (ref 0–500)
EKG ATRIAL RATE: 69 BPM
EKG ATRIAL RATE: 75 BPM
EKG P AXIS: 82 DEGREES
EKG P AXIS: 86 DEGREES
EKG P-R INTERVAL: 302 MS
EKG P-R INTERVAL: 352 MS
EKG Q-T INTERVAL: 416 MS
EKG Q-T INTERVAL: 442 MS
EKG QRS DURATION: 122 MS
EKG QRS DURATION: 136 MS
EKG QTC CALCULATION (BAZETT): 464 MS
EKG QTC CALCULATION (BAZETT): 473 MS
EKG R AXIS: -57 DEGREES
EKG R AXIS: -61 DEGREES
EKG T AXIS: 94 DEGREES
EKG T AXIS: 95 DEGREES
EKG VENTRICULAR RATE: 69 BPM
EKG VENTRICULAR RATE: 75 BPM
EOSINOPHIL # BLD: 4.6 %
EOSINOPHILS ABSOLUTE: 0.3 THOU/MM3 (ref 0–0.4)
EPITHELIAL CELLS, UA: ABNORMAL /HPF
ERYTHROCYTE [DISTWIDTH] IN BLOOD BY AUTOMATED COUNT: 14.4 % (ref 11.5–14.5)
ERYTHROCYTE [DISTWIDTH] IN BLOOD BY AUTOMATED COUNT: 50.4 FL (ref 35–45)
GFR SERPL CREATININE-BSD FRML MDRD: 19 ML/MIN/1.73M2
GLUCOSE BLD-MCNC: 105 MG/DL (ref 70–108)
GLUCOSE BLD-MCNC: 120 MG/DL (ref 70–108)
GLUCOSE BLD-MCNC: 137 MG/DL (ref 70–108)
GLUCOSE BLD-MCNC: 156 MG/DL (ref 70–108)
GLUCOSE BLD-MCNC: 212 MG/DL (ref 70–108)
GLUCOSE URINE: NEGATIVE MG/DL
HBA1C MFR BLD: 6.2 % (ref 4.4–6.4)
HCT VFR BLD CALC: 30.6 % (ref 37–47)
HDLC SERPL-MCNC: 33 MG/DL
HEMOGLOBIN: 10.1 GM/DL (ref 12–16)
IMMATURE GRANS (ABS): 0.03 THOU/MM3 (ref 0–0.07)
IMMATURE GRANULOCYTES: 0.4 %
INR BLD: 1.02 (ref 0.85–1.13)
KETONES, URINE: NEGATIVE
LACTIC ACID: 0.7 MMOL/L (ref 0.5–2.2)
LDL CHOLESTEROL CALCULATED: 137 MG/DL
LEUKOCYTE ESTERASE, URINE: NEGATIVE
LYMPHOCYTES # BLD: 7.6 %
LYMPHOCYTES ABSOLUTE: 0.6 THOU/MM3 (ref 1–4.8)
MAGNESIUM: 2 MG/DL (ref 1.6–2.4)
MCH RBC QN AUTO: 32.2 PG (ref 26–33)
MCHC RBC AUTO-ENTMCNC: 33 GM/DL (ref 32.2–35.5)
MCV RBC AUTO: 97.5 FL (ref 81–99)
MISCELLANEOUS 2: ABNORMAL
MONOCYTES # BLD: 8.7 %
MONOCYTES ABSOLUTE: 0.6 THOU/MM3 (ref 0.4–1.3)
NITRITE, URINE: NEGATIVE
NUCLEATED RED BLOOD CELLS: 0 /100 WBC
OPIATES, URINE: NEGATIVE
OXYCODONE: NEGATIVE
PH UA: 5
PHENCYCLIDINE QUANTITATIVE URINE: NEGATIVE
PLATELET # BLD: 196 THOU/MM3 (ref 130–400)
PMV BLD AUTO: 11.7 FL (ref 9.4–12.4)
POTASSIUM REFLEX MAGNESIUM: 4.5 MEQ/L (ref 3.5–5.2)
PROTEIN UA: 30
RBC # BLD: 3.14 MILL/MM3 (ref 4.2–5.4)
RBC URINE: ABNORMAL /HPF
RENAL EPITHELIAL, UA: ABNORMAL
SEG NEUTROPHILS: 78.3 %
SEGMENTED NEUTROPHILS ABSOLUTE COUNT: 5.8 THOU/MM3 (ref 1.8–7.7)
SODIUM BLD-SCNC: 142 MEQ/L (ref 135–145)
SPECIFIC GRAVITY, URINE: 1.02 (ref 1–1.03)
TOTAL PROTEIN: 6.3 G/DL (ref 6.1–8)
TRIGL SERPL-MCNC: 160 MG/DL (ref 0–199)
TROPONIN T: 0.04 NG/ML
UROBILINOGEN, URINE: 0.2 EU/DL
WBC # BLD: 7.4 THOU/MM3 (ref 4.8–10.8)
WBC UA: ABNORMAL /HPF
YEAST: ABNORMAL

## 2018-08-28 PROCEDURE — 93005 ELECTROCARDIOGRAM TRACING: CPT | Performed by: HOSPITALIST

## 2018-08-28 PROCEDURE — 83605 ASSAY OF LACTIC ACID: CPT

## 2018-08-28 PROCEDURE — 99233 SBSQ HOSP IP/OBS HIGH 50: CPT | Performed by: INTERNAL MEDICINE

## 2018-08-28 PROCEDURE — 83735 ASSAY OF MAGNESIUM: CPT

## 2018-08-28 PROCEDURE — 80307 DRUG TEST PRSMV CHEM ANLYZR: CPT

## 2018-08-28 PROCEDURE — 97161 PT EVAL LOW COMPLEX 20 MIN: CPT

## 2018-08-28 PROCEDURE — 80061 LIPID PANEL: CPT

## 2018-08-28 PROCEDURE — 80053 COMPREHEN METABOLIC PANEL: CPT

## 2018-08-28 PROCEDURE — 6370000000 HC RX 637 (ALT 250 FOR IP): Performed by: HOSPITALIST

## 2018-08-28 PROCEDURE — G8978 MOBILITY CURRENT STATUS: HCPCS

## 2018-08-28 PROCEDURE — 93010 ELECTROCARDIOGRAM REPORT: CPT | Performed by: NUCLEAR MEDICINE

## 2018-08-28 PROCEDURE — 85610 PROTHROMBIN TIME: CPT

## 2018-08-28 PROCEDURE — 94762 N-INVAS EAR/PLS OXIMTRY CONT: CPT

## 2018-08-28 PROCEDURE — 97530 THERAPEUTIC ACTIVITIES: CPT

## 2018-08-28 PROCEDURE — 6370000000 HC RX 637 (ALT 250 FOR IP): Performed by: INTERNAL MEDICINE

## 2018-08-28 PROCEDURE — 93005 ELECTROCARDIOGRAM TRACING: CPT | Performed by: INTERNAL MEDICINE

## 2018-08-28 PROCEDURE — G8979 MOBILITY GOAL STATUS: HCPCS

## 2018-08-28 PROCEDURE — 97110 THERAPEUTIC EXERCISES: CPT

## 2018-08-28 PROCEDURE — 2709999900 HC NON-CHARGEABLE SUPPLY

## 2018-08-28 PROCEDURE — 81001 URINALYSIS AUTO W/SCOPE: CPT

## 2018-08-28 PROCEDURE — 99223 1ST HOSP IP/OBS HIGH 75: CPT | Performed by: INTERNAL MEDICINE

## 2018-08-28 PROCEDURE — 6360000002 HC RX W HCPCS: Performed by: HOSPITALIST

## 2018-08-28 PROCEDURE — 2580000003 HC RX 258: Performed by: INTERNAL MEDICINE

## 2018-08-28 PROCEDURE — G8987 SELF CARE CURRENT STATUS: HCPCS

## 2018-08-28 PROCEDURE — 1200000003 HC TELEMETRY R&B

## 2018-08-28 PROCEDURE — 84484 ASSAY OF TROPONIN QUANT: CPT

## 2018-08-28 PROCEDURE — 2580000003 HC RX 258: Performed by: HOSPITALIST

## 2018-08-28 PROCEDURE — G8988 SELF CARE GOAL STATUS: HCPCS

## 2018-08-28 PROCEDURE — 36415 COLL VENOUS BLD VENIPUNCTURE: CPT

## 2018-08-28 PROCEDURE — 97165 OT EVAL LOW COMPLEX 30 MIN: CPT

## 2018-08-28 PROCEDURE — 99221 1ST HOSP IP/OBS SF/LOW 40: CPT | Performed by: INTERNAL MEDICINE

## 2018-08-28 PROCEDURE — 85025 COMPLETE CBC W/AUTO DIFF WBC: CPT

## 2018-08-28 PROCEDURE — 83036 HEMOGLOBIN GLYCOSYLATED A1C: CPT

## 2018-08-28 PROCEDURE — 82948 REAGENT STRIP/BLOOD GLUCOSE: CPT

## 2018-08-28 PROCEDURE — 85379 FIBRIN DEGRADATION QUANT: CPT

## 2018-08-28 RX ORDER — NICOTINE POLACRILEX 4 MG
15 LOZENGE BUCCAL PRN
Status: DISCONTINUED | OUTPATIENT
Start: 2018-08-28 | End: 2018-08-31 | Stop reason: HOSPADM

## 2018-08-28 RX ORDER — METOPROLOL SUCCINATE 50 MG/1
50 TABLET, EXTENDED RELEASE ORAL 2 TIMES DAILY
Status: DISCONTINUED | OUTPATIENT
Start: 2018-08-28 | End: 2018-08-31 | Stop reason: HOSPADM

## 2018-08-28 RX ORDER — DEXTROSE MONOHYDRATE 25 G/50ML
12.5 INJECTION, SOLUTION INTRAVENOUS PRN
Status: DISCONTINUED | OUTPATIENT
Start: 2018-08-28 | End: 2018-08-31 | Stop reason: HOSPADM

## 2018-08-28 RX ORDER — ASPIRIN 81 MG/1
81 TABLET, CHEWABLE ORAL DAILY
Status: DISCONTINUED | OUTPATIENT
Start: 2018-08-28 | End: 2018-08-31 | Stop reason: HOSPADM

## 2018-08-28 RX ORDER — ONDANSETRON 4 MG/1
4 TABLET, ORALLY DISINTEGRATING ORAL EVERY 8 HOURS PRN
Status: DISCONTINUED | OUTPATIENT
Start: 2018-08-28 | End: 2018-08-31 | Stop reason: HOSPADM

## 2018-08-28 RX ORDER — RANOLAZINE 500 MG/1
500 TABLET, EXTENDED RELEASE ORAL 2 TIMES DAILY
Status: DISCONTINUED | OUTPATIENT
Start: 2018-08-28 | End: 2018-08-31 | Stop reason: HOSPADM

## 2018-08-28 RX ORDER — LOSARTAN POTASSIUM 100 MG/1
100 TABLET ORAL DAILY
Status: DISCONTINUED | OUTPATIENT
Start: 2018-08-28 | End: 2018-08-31 | Stop reason: HOSPADM

## 2018-08-28 RX ORDER — ISOSORBIDE MONONITRATE 60 MG/1
60 TABLET, EXTENDED RELEASE ORAL DAILY
Status: DISCONTINUED | OUTPATIENT
Start: 2018-08-28 | End: 2018-08-31 | Stop reason: HOSPADM

## 2018-08-28 RX ORDER — ALLOPURINOL 100 MG/1
100 TABLET ORAL NIGHTLY
Status: DISCONTINUED | OUTPATIENT
Start: 2018-08-28 | End: 2018-08-31 | Stop reason: HOSPADM

## 2018-08-28 RX ORDER — FAMOTIDINE 20 MG/1
20 TABLET, FILM COATED ORAL DAILY
Status: DISCONTINUED | OUTPATIENT
Start: 2018-08-28 | End: 2018-08-31 | Stop reason: HOSPADM

## 2018-08-28 RX ORDER — HYDRALAZINE HYDROCHLORIDE 50 MG/1
100 TABLET, FILM COATED ORAL 3 TIMES DAILY
Status: DISCONTINUED | OUTPATIENT
Start: 2018-08-28 | End: 2018-08-31 | Stop reason: HOSPADM

## 2018-08-28 RX ORDER — SODIUM CHLORIDE, SODIUM LACTATE, POTASSIUM CHLORIDE, CALCIUM CHLORIDE 600; 310; 30; 20 MG/100ML; MG/100ML; MG/100ML; MG/100ML
INJECTION, SOLUTION INTRAVENOUS CONTINUOUS
Status: DISCONTINUED | OUTPATIENT
Start: 2018-08-28 | End: 2018-08-31

## 2018-08-28 RX ORDER — LEVOTHYROXINE SODIUM 0.15 MG/1
150 TABLET ORAL DAILY
Status: DISCONTINUED | OUTPATIENT
Start: 2018-08-28 | End: 2018-08-30

## 2018-08-28 RX ORDER — OXYBUTYNIN CHLORIDE 10 MG/1
10 TABLET, EXTENDED RELEASE ORAL DAILY
Status: DISCONTINUED | OUTPATIENT
Start: 2018-08-28 | End: 2018-08-31 | Stop reason: HOSPADM

## 2018-08-28 RX ORDER — ALLOPURINOL 300 MG/1
300 TABLET ORAL DAILY
Status: DISCONTINUED | OUTPATIENT
Start: 2018-08-28 | End: 2018-08-28

## 2018-08-28 RX ORDER — FUROSEMIDE 40 MG/1
40 TABLET ORAL DAILY
Status: DISCONTINUED | OUTPATIENT
Start: 2018-08-28 | End: 2018-08-28

## 2018-08-28 RX ORDER — DEXTROSE MONOHYDRATE 50 MG/ML
100 INJECTION, SOLUTION INTRAVENOUS PRN
Status: DISCONTINUED | OUTPATIENT
Start: 2018-08-28 | End: 2018-08-31 | Stop reason: HOSPADM

## 2018-08-28 RX ORDER — METOPROLOL SUCCINATE 50 MG/1
50 TABLET, EXTENDED RELEASE ORAL DAILY
Status: DISCONTINUED | OUTPATIENT
Start: 2018-08-28 | End: 2018-08-28

## 2018-08-28 RX ORDER — CLOPIDOGREL BISULFATE 75 MG/1
75 TABLET ORAL DAILY
Status: DISCONTINUED | OUTPATIENT
Start: 2018-08-28 | End: 2018-08-31 | Stop reason: HOSPADM

## 2018-08-28 RX ADMIN — VITAMIN D, TAB 1000IU (100/BT) 5000 UNITS: 25 TAB at 07:46

## 2018-08-28 RX ADMIN — ALLOPURINOL 100 MG: 100 TABLET ORAL at 20:30

## 2018-08-28 RX ADMIN — HYDRALAZINE HYDROCHLORIDE 100 MG: 50 TABLET ORAL at 13:48

## 2018-08-28 RX ADMIN — HEPARIN SODIUM 5000 UNITS: 5000 INJECTION INTRAVENOUS; SUBCUTANEOUS at 09:15

## 2018-08-28 RX ADMIN — LEVOTHYROXINE SODIUM 150 MCG: 150 TABLET ORAL at 06:02

## 2018-08-28 RX ADMIN — Medication 10 ML: at 07:49

## 2018-08-28 RX ADMIN — RANOLAZINE 500 MG: 500 TABLET, FILM COATED, EXTENDED RELEASE ORAL at 20:30

## 2018-08-28 RX ADMIN — CLOPIDOGREL BISULFATE 75 MG: 75 TABLET ORAL at 07:41

## 2018-08-28 RX ADMIN — OXYBUTYNIN CHLORIDE 10 MG: 10 TABLET, EXTENDED RELEASE ORAL at 07:46

## 2018-08-28 RX ADMIN — Medication 1 UNITS: at 20:31

## 2018-08-28 RX ADMIN — RANOLAZINE 500 MG: 500 TABLET, FILM COATED, EXTENDED RELEASE ORAL at 07:46

## 2018-08-28 RX ADMIN — ISOSORBIDE MONONITRATE 60 MG: 60 TABLET ORAL at 07:46

## 2018-08-28 RX ADMIN — SODIUM CHLORIDE, POTASSIUM CHLORIDE, SODIUM LACTATE AND CALCIUM CHLORIDE: 600; 310; 30; 20 INJECTION, SOLUTION INTRAVENOUS at 17:30

## 2018-08-28 RX ADMIN — HYDRALAZINE HYDROCHLORIDE 100 MG: 50 TABLET ORAL at 20:30

## 2018-08-28 RX ADMIN — SODIUM CHLORIDE, POTASSIUM CHLORIDE, SODIUM LACTATE AND CALCIUM CHLORIDE: 600; 310; 30; 20 INJECTION, SOLUTION INTRAVENOUS at 07:57

## 2018-08-28 RX ADMIN — LOSARTAN POTASSIUM 100 MG: 100 TABLET, FILM COATED ORAL at 07:46

## 2018-08-28 RX ADMIN — HYDRALAZINE HYDROCHLORIDE 100 MG: 50 TABLET ORAL at 07:46

## 2018-08-28 RX ADMIN — HEPARIN SODIUM 5000 UNITS: 5000 INJECTION INTRAVENOUS; SUBCUTANEOUS at 17:31

## 2018-08-28 RX ADMIN — Medication 2 UNITS: at 12:49

## 2018-08-28 RX ADMIN — ASPIRIN 81 MG CHEWABLE TABLET 81 MG: 81 TABLET CHEWABLE at 07:41

## 2018-08-28 RX ADMIN — FAMOTIDINE 20 MG: 20 TABLET ORAL at 07:41

## 2018-08-28 RX ADMIN — HEPARIN SODIUM 5000 UNITS: 5000 INJECTION INTRAVENOUS; SUBCUTANEOUS at 03:22

## 2018-08-28 RX ADMIN — METOPROLOL SUCCINATE 50 MG: 50 TABLET, FILM COATED, EXTENDED RELEASE ORAL at 17:30

## 2018-08-28 RX ADMIN — ACETAMINOPHEN 650 MG: 325 TABLET ORAL at 23:13

## 2018-08-28 ASSESSMENT — ENCOUNTER SYMPTOMS
SHORTNESS OF BREATH: 1
EYE ITCHING: 0
ANAL BLEEDING: 0
BACK PAIN: 0
COLOR CHANGE: 0
SINUS PAIN: 0
NAUSEA: 0
VOMITING: 0
VOICE CHANGE: 0
EYE REDNESS: 0
CHOKING: 0
BLOOD IN STOOL: 0
APNEA: 0
ABDOMINAL DISTENTION: 0
RECTAL PAIN: 0
DIARRHEA: 0
CHEST TIGHTNESS: 0
STRIDOR: 0
COUGH: 0
WHEEZING: 0
PHOTOPHOBIA: 0

## 2018-08-28 ASSESSMENT — PAIN SCALES - GENERAL: PAINLEVEL_OUTOF10: 1

## 2018-08-28 NOTE — H&P
History & Physical        Patient:  Dez Hernandez  YOB: 1931    MRN: [id number]     Acct: [de-identified]    PCP: Javed Faria MD    Date of Admission: 8/27/2018    Date of Service: Pt seen/examined and admitted to Inpatient with expected LOS greater than two midnights due to medical therapy    Chief Complaint:  SOB x 1 week      History Of Present Illness:      80 y.o. female who presented to [address] [place] [address] with SOB, nausea and vomiting. No fever. No sick contacts. No chest pain, cough, wheezing. ED eval => Troponin 0.045, bicarb 20, bun/cr 96/2.3, lactate 2.1, lipase 145, hb 11.7.    Was started on supplemental o2    Past Medical History:          Diagnosis Date    Anemia     Atrial fib/flutter, transient 2000    Atrial fibrillation (HCC)     CHF (congestive heart failure) (HCC)     DJD (degenerative joint disease)     DM type 2 (diabetes mellitus, type 2) ([facility] [address]     Gout     HLD (hyperlipidemia)     HTN (hypertension)     Hypothyroidism     Incontinence     Obesity     Pneumonia     Renal artery stenosis (HCC)     Renal insufficiency        Past Surgical History:          Procedure Laterality Date    ACHILLES TENDON SURGERY  3/2004    ANUS SURGERY  2010    BACK SURGERY      BLADDER REPAIR  [id number]    CARDIAC CATHETERIZATION  07/30/2018    CARDIAC SURGERY      heart cath    CATARACT REMOVAL Right 12/2007    CATARACT REMOVAL Left 1/2008    CORONARY ANGIOPLASTY WITH STENT PLACEMENT  2015    LAD, RCA    COSMETIC SURGERY  5/2008    eyelid    EKG 12-LEAD  4/25/2015         ENDOSCOPY, COLON, DIAGNOSTIC      EYE SURGERY      FINGER SURGERY Left 8/2010    left index    FOOT SURGERY  10/2004    HYSTERECTOMY  1978    JOINT REPLACEMENT      both knees    KNEE ARTHROPLASTY      R 2/98, L 10/98    LUMBAR SPINE SURGERY  3/4/05    REFRACTIVE SURGERY  2012    THYROIDECTOMY  1958    UPPER GASTROINTESTINAL ENDOSCOPY  2015    Dr. Governor Cooper Parker Ceclor [cefaclor]; Oxycontin [oxycodone]; Tape [adhesive tape]; Tylenol with codeine #3 [acetaminophen-codeine]; Ultram [tramadol]; Vioxx [rofecoxib]; Advil [ibuprofen]; Bumex [bumetanide]; Codeine; Darvocet a500 [propoxyphene n-acetaminophen]; and Darvon [fd&c red #40-fd&c yellow #10-propoxyphene]    Social History:        TOBACCO:   reports that she has never smoked. She has never used smokeless tobacco.  ETOH:   reports that she does not drink alcohol. Family History:       Reviewed in detail and negative for DM, CAD, Cancer, CVA. Positive as follows:    Family History   Problem Relation Age of Onset    Cancer Mother         breast    Heart Disease Mother         murmur    Arthritis Mother         rheumatism    Arthritis Father         rheumatism    Cancer Brother         jaw    Diabetes Neg Hx     High Blood Pressure Neg Hx     Stroke Neg Hx        Diet:  DIET CARB CONTROL;    REVIEW OF SYSTEMS:   Pertinent positives as noted in the HPI. All other systems reviewed and negative. PHYSICAL EXAM:    BP (!) 193/77   Pulse 75   Temp 98 °F (36.7 °C) (Oral)   Resp 20   Ht 5' 3\" (1.6 m)   Wt 186 lb 14.4 oz (84.8 kg)   SpO2 100% Comment: turned patient down to 1L  BMI 33.11 kg/m²     General appearance:  Obese, no apparent distress, appears stated age and cooperative. HEENT:  Normal cephalic, atraumatic without obvious deformity. Pupils equal, round, and reactive to light. Extra ocular muscles intact. Conjunctivae/corneas clear. Neck: Supple, with full range of motion. No jugular venous distention. Trachea midline. Respiratory:  Normal respiratory effort. Clear to auscultation, bilaterally without Rales/Wheezes/Rhonchi. Cardiovascular:  Irregularly irregular rate and rhythm with normal S1/S2 without murmurs, rubs or gallops. Abdomen: Soft, non-tender, non-distended with normal bowel sounds. Musculoskeletal:  No clubbing, cyanosis or edema bilaterally.   Full range of motion without deformity. Skin: Skin color, texture, turgor normal.  No rashes or lesions. Neurologic:  Neurovascularly intact without any focal sensory/motor deficits. Cranial nerves: II-XII intact, grossly non-focal.  Psychiatric:  Alert and oriented, thought content appropriate, normal insight  Capillary Refill: Brisk,< 3 seconds   Peripheral Pulses: +2 palpable, equal bilaterally       Labs:     Recent Labs      08/27/18   1707   WBC  9.6   HGB  11.7*   HCT  35.0*   PLT  240     Recent Labs      08/27/18   1707   NA  141   K  4.1   CL  100   CO2  20*   BUN  96*   CREATININE  2.3*   CALCIUM  9.8     Recent Labs      08/27/18   1707   AST  12   ALT  <5*   BILIDIR  <0.2   BILITOT  0.9   ALKPHOS  66     No results for input(s): INR in the last 72 hours. No results for input(s): Saralee Brenden in the last 72 hours. Urinalysis:      Lab Results   Component Value Date    NITRU NEGATIVE 08/28/2018    WBCUA 0-2 08/28/2018    BACTERIA NONE 08/28/2018    RBCUA NONE SEEN 08/28/2018    BLOODU NEGATIVE 08/28/2018    GLUCOSEU NEGATIVE 08/28/2018       Intake & Output:  No intake/output data recorded. No intake/output data recorded. Radiology:     CXR: I have reviewed the CXR with the following interpretation:   EKG:  I have reviewed the EKG with the following interpretation:     CT ABDOMEN PELVIS WO CONTRAST Additional Contrast? None   Final Result   1. Stable postsurgical changes of the lumbar spine. 2. Chronic degenerative skeletal spondylosis and atherosclerotic disease without interval change. 3. Colonic diverticulosis without diverticulitis or evidence of inflammatory process. 4. Stable small bilateral renal cysts without obstructive uropathy. 5. Small hiatal hernia. **This report has been created using voice recognition software. It may contain minor errors which are inherent in voice recognition technology. **      Final report electronically signed by Dr. Amira Johnson on 8/27/2018 9:35 PM      NM LUNG VENT/PERFUSION (VQ)   Final Result   1. Low probability for pulmonary artery embolism. Final report electronically signed by Dr. Yovani Simon on 8/27/2018 8:48 PM      XR CHEST PORTABLE   Final Result   No acute findings               **This report has been created using voice recognition software. It may contain minor errors which are inherent in voice recognition technology. **      Final report electronically signed by Dr. Yovani Simon on 8/27/2018 5:18 PM               DVT prophylaxis: [] Lovenox                                 [] SCDs                                 [] SQ Heparin                                 [] Encourage ambulation           [] Already on Anticoagulation    Code Status: Full Code      PT/OT Eval Status:     Disposition:    [] Home       [] TCU       [] Rehab       [] Psych       [] SNF       [] [facility]       [] Other-    ASSESSMENT:    C/Moustapha Best [address] Problems    Diagnosis Date Noted    SOB (shortness of breath) [R06.02] 07/29/2018       PLAN:     Acute respiratoy failure, etiology unclear. Supplemental o2. Wean    Elevated troponin. No chest pain. EKG unremarkable for significant S-T, T changes. Serial troponin, fasting lipid profile    Metabolic acodos    Hyperlactatemia. IVF N/S    Chronic A-Fib. Rate controlled. Resume home regimen    CAD s/p stents. No acute issues. Resume cardioprotective regimen    Anemia    Elevated lipase. No abdominal pain. Monitor lipase    CKD 3. At baseline    DM 2. SSI. Hold glipizide    CHF. Last 2decho lvef 53%, grade 1 diastolic dysfunction with concern for ASD. Appears compenated. Thank you Servando Montero MD for the opportunity to be involved in this patient's care.     Electronically signed by Jennifer Gonzalez MD on 8/28/2018 at 1:04 AM

## 2018-08-28 NOTE — ED NOTES
Patient transported to [facility]  by cart in stable condition. Patient monitored on cardiac telemetry. Patient on 2 LPM O2 via nasal canula. IV infusing and line is patent.         oSn Chavis, EMT-P  08/27/18 2443

## 2018-08-28 NOTE — PROGRESS NOTES
Nutrition Assessment    Type and Reason for Visit: Initial, Positive Nutrition Screen (weight loss, poor po)    Nutrition Recommendations: Continue current diet. Recommend MVI. Malnutrition Assessment:  · Malnutrition Status: Meets the criteria for moderate malnutrition  · Context: Acute illness or injury  · Findings of the 6 clinical characteristics of malnutrition (Minimum of 2 out of 6 clinical characteristics is required to make the diagnosis of moderate or severe Protein Calorie Malnutrition based on AND/ASPEN Guidelines):  1. Energy Intake-Less than or equal to 75%, greater than 7 days   2. Fat Loss-Mild subcutaneous fat loss, Orbital  3. Muscle Loss-Mild muscle mass loss, Temples (temporalis muscle)    Nutrition Diagnosis:   · Problem: Moderate malnutrition  · Etiology: related to Insufficient energy/nutrient consumption     Signs and symptoms:  as evidenced by Diet history of poor intake (mild subcutaneous fat loss, mild muscle loss)    Nutrition Assessment:  · Subjective Assessment:  Pt. Seen -reports decreased appetite for past week or 2. Reports nausea but denies any abdominal pain. States mouth is dry but denies need for altered textures. Reports blood sugars are well controlled pta.  8/28: HgbA1c 6.2%, average blood sugar 126 mg/dl. 8/28: BUN 93, Cr 2.4. Pt. States uses salt substitute at home and limits processed meats but does consume canned soup. Agrees to trial Glucerna once/day. States \"they told me not too much protein d/t to my kidneys\".   · Wound Type: None  · Current Nutrition Therapies:  · Oral Diet Orders: Carb Control 4 Carbs/Meal   · Oral Diet intake: %  · Oral Nutrition Supplement (ONS) Orders: Diabetic Oral Supplement (once/day)  · ONS intake:  (initiated)  · Anthropometric Measures:  · Ht: 5' 3\" (160 cm)   · Current Body Wt: 186 lb 14.4 oz (84.8 kg) (8/27, trace edema)  · Admission Body Wt: 184 lb 8 oz (83.7 kg) (8/27, trace edema)  · Usual Body Wt:  (~190# per pt; per EMR: 8/15/18: 191# 6 oz, 6/18: 205# 12.8 oz (unsure of edema or not))  · % Weight Change: -2.4%,  2 weeks (question at least partially d/t fluid)  · Ideal Body Wt: 115 lb (52.2 kg),   · BMI Classification: BMI 30.0 - 34.9 Obese Class I  · Comparative Standards (Estimated Nutrition Needs):  · Estimated Daily Total Kcal: 2567-2883 kcals  · Estimated Daily Protein (g): 42-52 gm (as renal function allows)    Estimated Intake vs Estimated Needs: Intake Less Than Needs    Nutrition Risk Level: High    Nutrition Interventions:   Continue current diet, Start ONS  Continued Inpatient Monitoring, Education Initiated, Coordination of Care (8/28 Encouraged continued compliance with diabetic diet. Encouraged low sodium diet. Discussed appropriate use of ONS if intake poor.)    Nutrition Evaluation:   · Evaluation: Goals set   · Goals: Pt. will consume 75% or more of meals during LOS. · Monitoring: Meal Intake, Supplement Intake, Diet Tolerance, Ascites/Edema, Weight, Pertinent Labs, Patient/Family Education    See Adult Nutrition Doc Flowsheet for more detail.      Electronically signed by Steff Britt RD, LD on 8/28/18 at 10:04 AM    Contact Number: [phone]

## 2018-08-28 NOTE — PROGRESS NOTES
Jhonataniggy Reed 60  INPATIENT OCCUPATIONAL THERAPY  [facility] ICU STEPDOWN TELEMETRY 4K  EVALUATION    Time:  Time In: 1140  Time Out: 1218  Timed Code Treatment Minutes: 23 Minutes  Minutes: 38          Date: 2018  Patient Name: Tamera Garcia,   Gender: female      MRN: [id number]  : 1931  (80 y.o.)  Referring Practitioner: Dr. Rebekah Santamaria MD  Diagnosis: SOB  Additional Pertinent Hx: Pt presents to the ED via EMS for the evaluation of shortness of breath onset 1 week ago. She reports associated nausea, emesis, fatigue, productive cough and decreased urine output. She denies diarrhea, abdominal pain, constipation, chest pain, chills or palpitations. Patient was told 10 days ago by Dr. Laureano that she needed blood work done. Patient was told today by her PCP that her creatine was elevated and was sent here.      Restrictions/Precautions:  Fall Risk                            Past Medical History:   Diagnosis Date    Anemia     Atrial fib/flutter, transient 2000    Atrial fibrillation (HCC)     CHF (congestive heart failure) (HCC)     DJD (degenerative joint disease)     DM type 2 (diabetes mellitus, type 2) ([facility] [address]     Gout     HLD (hyperlipidemia)     HTN (hypertension)     Hypothyroidism     Incontinence     Obesity     Pneumonia     Renal artery stenosis (HCC)     Renal insufficiency      Past Surgical History:   Procedure Laterality Date    ACHILLES TENDON SURGERY  3/2004    ANUS SURGERY      BACK SURGERY      BLADDER REPAIR  [id number]    CARDIAC CATHETERIZATION  2018    CARDIAC SURGERY      heart cath    CATARACT REMOVAL Right 2007    CATARACT REMOVAL Left 2008    CORONARY ANGIOPLASTY WITH STENT PLACEMENT      LAD, RCA    COSMETIC SURGERY  2008    eyelid    EKG 12-LEAD  2015         ENDOSCOPY, COLON, DIAGNOSTIC      EYE SURGERY      FINGER SURGERY Left 2010    left index    FOOT SURGERY  10/2004   [address]    JOINT REPLACEMENT      both knees    KNEE ARTHROPLASTY      R 2/98, L 10/98    LUMBAR SPINE SURGERY  3/4/05    REFRACTIVE SURGERY  2012    THYROIDECTOMY  1958    UPPER GASTROINTESTINAL ENDOSCOPY  2015    Dr. Oren Bergeron           Subjective  Chart Reviewed: Yes (Internal medicine note; order review)  Patient assessed for rehabilitation services?: Yes  Response to previous treatment: Patient with no complaints from previous session  Family / Caregiver Present: Yes (Daughter present at the start and end of session; supportive and asking questions)    Subjective: Pleasant and cooperative  Comments: RN approved session. Pt was using 4L of O2. Per her nurse, she had been wearing O2 for when she was asleep. Pt agreed to try doing her therapy without using the O2. She complained about nausea when she was walking. General:  Overall Orientation Status: Within Normal Limits    Vision: Impaired  Vision Exceptions: Wears glasses for reading    Hearing: Exceptions to [facility]  Hearing Exceptions: Hard of hearing/hearing concerns         Pain:  Pain Assessment  Patient Currently in Pain: Denies       Social/Functional History:  Lives With: Daughter (daughter has 2 kids at home- 25 and 23 yrs old)  Type of Home: House  Home Layout: One level  Home Access: Ramped entrance  [address]: 4 wheeled walker     Bathroom Shower/Tub: Walk-in shower, Shower chair with back  Bathroom Toilet: Standard  Bathroom Equipment: Grab bars in shower  Bathroom Accessibility: Accessible  IADL Comments: Pt was doing her self care and helped with cleaning or cooking as needed. She slept in a chair often times. She had help with loading/washing the laundry but she would put it away. Pt took rides to the store. She used an electric cart at the store or waited in the car while her daughter shopped. Receives Help From: Family  ADL Assistance: Independent  Homemaking Assistance: Needs assistance  Meal Prep: Moderate  Laundry:  Moderate  Vacuuming: assistance (Preparing to walk)     Time: 30 seconds; 15 seconds mutliple trials  Activity: Preparing to start walking; taking rest breaks during the walk     Functional Mobility  Functional - Mobility Device: Rolling Walker  Activity: Other  Assist Level: Contact guard assistance  Functional Mobility Comments: Walked with forward leaning and pressing on her walker. She needed cues to try to keep her back as straight as possible. Pt took several standing rest breaks while walking. Activity Tolerance:  Activity Tolerance: Patient limited by fatigue  Activity Tolerance: Pt was needing rest breaks while walking. She needed cues to straighten up while using the rolling walker as her arms get very tired while leaning forward and pushing the walker. She remained in the chair following session. Treatment Initiated:  Pt was having some nausea on the way back to her room. She had to sit in a chair for several minutes before she could return to the room. Min A for sit to stand from the chair and SBA to sit in the recliner chair. Pt's O2 saturation was at 96% when she was on room air after walking. She did feel more relaxed when she had her legs elevated and she was sitting in the room. Discussed with pt and family members that pacing is an important part of her activity. Her participation in therapy will also need to take a slow but steady pace. Reviewed with pt energy conservation techniques. Pt voiced understanding. Assessment:  Assessment: Pt would benefit from continued skilled OT services to address above deficits. She presents with decreased endurance and ADLs. She struggles with walking far distances but this is close to her baseline, per pt. She did her own self care prior to admission. She reported helping with housekeeping. She needed rest breaks while walking and also had help with straightening her slipper socks.

## 2018-08-28 NOTE — ED PROVIDER NOTES
Dariustent is a sign out from [employer]. Presented with shortness of breath and hyper ventilation. Patient was initially treated with Ativan and her breathing improved. Repeat ABG within normal.  VQ scan low probability of PE. Troponin elevated second troponin stable. Patient did not have any chest pain. CT abdomen no evidence of acute abnormality. I called and discussed case with hospitalist on-call Dr. Salud Becerra accepted patient for admission.     Diagnoses  Elevated troponin  Dyspnea  Elevated lipase  Nausea and vomiting    Disposition  Admission       Alexandria Vigil MD  08/28/18 2232

## 2018-08-28 NOTE — DISCHARGE INSTR - DIET
on your intake of high fat food can help reduce body weight and cholesterol levels. Reduce intake of fried food, melton, sausage, luncheon meat, gravy, sour cream, cheese, egg yolks and margarine/butter. Limit your intake of alcohol. Drink alcohol only with permission of your doctor. Never drink alcohol on an empty stomach. Be more active. Regular exercise is an important part of your diabetes care as exercise can help lower your blood sugar levels. The type and amount of exercise that is right for you should be discussed with your doctor.

## 2018-08-28 NOTE — PROGRESS NOTES
[address]  [id number]    CARDIAC CATHETERIZATION  07/30/2018    CARDIAC SURGERY      heart cath    CATARACT REMOVAL Right 12/2007    CATARACT REMOVAL Left 1/2008    CORONARY ANGIOPLASTY WITH STENT PLACEMENT  2015    LAD, RCA    COSMETIC SURGERY  5/2008    eyelid    EKG 12-LEAD  4/25/2015         ENDOSCOPY, COLON, DIAGNOSTIC      EYE SURGERY      FINGER SURGERY Left 8/2010    left index    FOOT SURGERY  10/2004    HYSTERECTOMY  1978    JOINT REPLACEMENT      both knees    KNEE ARTHROPLASTY      R 2/98, L 10/98    LUMBAR SPINE SURGERY  3/4/05    REFRACTIVE SURGERY  2012    THYROIDECTOMY  1958    UPPER GASTROINTESTINAL ENDOSCOPY  2015    Dr. Kaleb Tripp       Restrictions/Precautions:  Fall Risk, General Precautions        Subjective:  Chart Reviewed: Yes  Patient assessed for rehabilitation services?: Yes  Subjective: Pt resting in recliner and reports that she sleeps in a recliner at home as well. General:  Overall Orientation Status: Within Normal Limits    Vision: Impaired  Vision Exceptions: Wears glasses for reading    Hearing: Exceptions to [facility]  Hearing Exceptions: Hard of hearing/hearing concerns       Pain:  Denies. Social/Functional History:    Lives With: Daughter (daughter has 2 kids at home- 25 and 23 yrs old)  Type of Home: House  Home Layout: One level  Home Access: Ramped entrance  [address]: 4 wheeled walker     Bathroom Shower/Tub: Walk-in shower, Shower chair with back  [address]: Grab bars in shower  Bathroom Accessibility: Accessible  IADL Comments: Pt was doing her self care and helped with cleaning or cooking as needed. She slept in a chair often times. She had help with loading/washing the laundry but she would put it away. Pt took rides to the store. She used an electric cart at the store or waited in the car while her daughter shopped.       Receives Help From: Family  ADL Assistance: Independent    Homemaking Yes  Discharge Recommendations: Continue to assess pending progress, Home with Home health PT    Patient Education:  Patient Education: plan of care and LE exercises    Equipment Recommendations: Other: monitor for needs    Safety:  Type of devices: All fall risk precautions in place, Left in chair, Call light within reach, Chair alarm in place, Gait belt, Patient at risk for falls, Nurse notified    Plan:  Times per week: 5x GM  Times per day: Daily  Specific instructions for Next Treatment: ther ex, ther act, gait trng, functional mobility  Current Treatment Recommendations: Strengthening, Endurance Training, Functional Mobility Training, Transfer Training, Gait Training, Patient/Caregiver Education & Training, Safety Education & Training, Home Exercise Program    Goals:  Patient goals : go home  Short term goals  Time Frame for Short term goals: 3 days  Short term goal 1: Pt to be Mod I for sit <> stand to get up to ambulate  Short term goal 2: Pt to ambulate > 150 ft with RW with Supervision for household distances  Long term goals  Time Frame for Long term goals : not set due to short ELOS    Evaluation Complexity: Based on the findings of patient history, examination, clinical presentation, and decision making during this evaluation, the evaluation of Tessa Ham is of low complexity. PT G-Codes  Functional Assessment Tool Used: [facility]  Functional Limitation: Mobility: Walking and moving around  Mobility: Walking and Moving Around Current Status (): At least 40 percent but less than 60 percent impaired, limited or restricted  Mobility: Walking and Moving Around Goal Status ():  At least 40 percent but less than 60 percent impaired, limited or restricted       AM-PAC Inpatient Mobility without Stair Climbing Raw Score : 16  AM-PAC Inpatient without Stair Climbing T-Scale Score : 45.54  Mobility Inpatient CMS 0-100% Score: 40.64  Mobility Inpatient without Stair CMS G-Code Modifier :

## 2018-08-28 NOTE — PROGRESS NOTES
Hospitalist Progress Note  [facility] ICU STEPDOWN TELEMETRY 4K       Patient: Lorna Vaca  Unit/Bed: 0F-94/232-F  YOB: 1931  MRN: [id number]  Acct: [de-identified]   Admitting Diagnosis: SOB (shortness of breath) [R06.02]  Admit Date:  8/27/2018  Hospital Day: 1    Subjective:    Patient is feeling fine today. Hypoxemia w/ day time somnolence noted. Has no N/V/D. No fever. Patient Seen, Chart, Labs, Radiology studies, and Consults reviewed. Objective:   BP (!) 140/83   Pulse 65   Temp 98.3 °F (36.8 °C) (Oral)   Resp 16   Ht 5' 3\" (1.6 m)   Wt 186 lb 14.4 oz (84.8 kg)   SpO2 96%   BMI 33.11 kg/m²       Intake/Output Summary (Last 24 hours) at 08/28/18 1850  Last data filed at 08/28/18 1409   Gross per 24 hour   Intake              950 ml   Output              750 ml   Net              200 ml     Review of Systems   Constitutional: Positive for activity change. Negative for chills, fatigue, fever and unexpected weight change. HENT: Negative for congestion, drooling, ear pain, hearing loss, postnasal drip, sinus pain, sneezing, tinnitus and voice change. Eyes: Negative for photophobia, redness, itching and visual disturbance. Respiratory: Positive for shortness of breath. Negative for apnea, cough, choking, chest tightness, wheezing and stridor. Cardiovascular: Negative for chest pain, palpitations and leg swelling. Gastrointestinal: Negative for abdominal distention, anal bleeding, blood in stool, diarrhea, nausea, rectal pain and vomiting. Endocrine: Negative for heat intolerance, polydipsia, polyphagia and polyuria. Genitourinary: Negative for difficulty urinating, dysuria, flank pain, genital sores, menstrual problem, urgency, vaginal discharge and vaginal pain. Musculoskeletal: Negative for back pain, joint swelling, neck pain and neck stiffness. Skin: Negative for color change, pallor and wound. Allergic/Immunologic: Negative for food allergies.

## 2018-08-28 NOTE — CONSULTS
Consult to Cardiology  Consult performed by: Blas Meier ordered by: Dionne Nyhan          Elevated troponin- chronic  CAD s/p hx of  PCI to LAD, PCI to RCA,   HTN,   Dyslipidemia   DHN  N/V    Plan  Med RX    [id number]    Cath 07/18  Procedure Summary  I used 20 cc contrast.  Creat was 2.3  Mild ds in LAD/RCA/Lcx. Diffuse calcification in coronary arteries. LAD and RCA stents are patent. Ostial Diag 1 and ostial Diag 2 have 80% stenosis - Small size vessels. Mild-moderate pulmonary hypertension.     Kentrell Hope MD

## 2018-08-28 NOTE — CONSULTS
Nephrology Consult Note  Patient's Name: Amanda Santana  2:53 PM  8/28/2018    Nephrologist: Evi Barnes    Reason for Consult:  Acute kidney injury  Requesting Physician:  Juliann Yates MD    Chief Complaint:  Nausea and vomiting  Assessment  1-acute kidney injury improved and due to volume contraction resulting from nausea and vomiting and subsequent poor oral intake with dehydration. 2-dehydration due to poor oral intake  3-stage IIIB chronic kidney disease  4. Elevated troponin level significant uncertain  5. Normocytic anemia of chronic disease  6. Atrial fibrillation with controlled ventricular rate  7. Hypertension inadequately controlled    Plan  1-I discussed my thoughts with the patient and she understood  2-I addressed her questions  3-labs reviewed  4. Medications reviewed  5. Start lactated Ringer 100 mL an hour being. 6.  Increase metoprolol XL from 50 mg a day to twice a day  7. Discontinue allopurinol 300 mg once a day as she is also on 100 mg once a day at the same time  8. Hold furosemide for now  9.   labs in the morning  10. We'll follow    History Obtained From:  Patient, staff and medical record  History of Present Ilness:    Amanda Santana is a 80 y.o. female with history of chronic kidney disease stage III B, hypertension, congestive heart failure, atrial fibrillation multiple other multiple medical entities was admitted through the emergency department after the patient presented there with nausea and vomiting. She was admitted for evaluation and management. Serum creatinine is around 1.5 mg by deciliter up to 2.0 mg/dL. Today it is 2.4 mg /dl. However recently it  was also 2.8 mg/dL. Baseline BUN is around the 50-60 mg per deciliter. Today it is 92 mg/dL. Her oral intake has been poor according to her. No chest pain or shortness of breath however. No abdominal pain. No fever or chills. CAT scan of the abdomen and pelvis was done in the  emergency department.   It was unremarkable. Past Medical History:   Diagnosis Date    Anemia     Atrial fib/flutter, transient 2000    Atrial fibrillation (HCC)     CHF (congestive heart failure) (HCC)     DJD (degenerative joint disease)     DM type 2 (diabetes mellitus, type 2) ([facility] [address]     Gout     HLD (hyperlipidemia)     HTN (hypertension)     Hypothyroidism     Incontinence     Obesity     Pneumonia     Renal artery stenosis (HCC)     Renal insufficiency        Past Surgical History:   Procedure Laterality Date    ACHILLES TENDON SURGERY  3/2004    ANUS SURGERY  2010    BACK SURGERY      BLADDER REPAIR  [id number]    CARDIAC CATHETERIZATION  07/30/2018    CARDIAC SURGERY      heart cath    CATARACT REMOVAL Right 12/2007    CATARACT REMOVAL Left 1/2008    CORONARY ANGIOPLASTY WITH STENT PLACEMENT  2015    LAD, RCA    COSMETIC SURGERY  5/2008    eyelid    EKG 12-LEAD  4/25/2015         ENDOSCOPY, COLON, DIAGNOSTIC      EYE SURGERY      FINGER SURGERY Left 8/2010    left index    FOOT SURGERY  10/2004    HYSTERECTOMY  1978    JOINT REPLACEMENT      both knees    KNEE ARTHROPLASTY      R 2/98, L 10/98    LUMBAR SPINE SURGERY  3/4/05    REFRACTIVE SURGERY  2012    THYROIDECTOMY  1958    UPPER GASTROINTESTINAL ENDOSCOPY  2015    Dr. Chaz Blankenship       Family History   Problem Relation Age of Onset    Cancer Mother         breast    Heart Disease Mother         murmur    Arthritis Mother         rheumatism    Arthritis Father         rheumatism    Cancer Brother         jaw    Diabetes Neg Hx     High Blood Pressure Neg Hx     Stroke Neg Hx         reports that she has never smoked. She has never used smokeless tobacco. She reports that she does not drink alcohol or use drugs. Allergies:  Atarax [hydroxyzine]; Biaxin [clarithromycin]; Ceclor [cefaclor]; Oxycontin [oxycodone]; Tape [adhesive tape]; Tylenol with codeine #3 [acetaminophen-codeine]; Ultram [tramadol]; Vioxx [rofecoxib];  Advil [ibuprofen]; Bumex [bumetanide]; Codeine; Darvocet a500 [propoxyphene n-acetaminophen]; and Darvon [fd&c red #40-fd&c yellow #10-propoxyphene]    Current Medications:      ondansetron (ZOFRAN-ODT) disintegrating tablet 4 mg Q8H PRN   glucose (GLUTOSE) 40 % oral gel 15 g PRN   dextrose 50 % solution 12.5 g PRN   glucagon (rDNA) injection 1 mg PRN   dextrose 5 % solution PRN   aspirin chewable tablet 81 mg Daily   vitamin D (CHOLECALCIFEROL) tablet 5,000 Units Daily   clopidogrel (PLAVIX) tablet 75 mg Daily   famotidine (PEPCID) tablet 20 mg Daily   hydrALAZINE (APRESOLINE) tablet 100 mg TID   allopurinol (ZYLOPRIM) tablet 100 mg Nightly   isosorbide mononitrate (IMDUR) extended release tablet 60 mg Daily   levothyroxine (SYNTHROID) tablet 150 mcg Daily   losartan (COZAAR) tablet 100 mg Daily   oxybutynin (DITROPAN-XL) extended release tablet 10 mg Daily   ranolazine (RANEXA) extended release tablet 500 mg BID   metoprolol succinate (TOPROL XL) extended release tablet 50 mg BID   lactated ringers infusion Continuous   sodium chloride flush 0.9 % injection 10 mL 2 times per day   sodium chloride flush 0.9 % injection 10 mL PRN   magnesium hydroxide (MILK OF MAGNESIA) 400 MG/5ML suspension 30 mL Daily PRN   acetaminophen (TYLENOL) tablet 650 mg Q4H PRN   insulin lispro (HUMALOG) injection vial 0-6 Units 4x Daily AC & HS   heparin (porcine) injection 5,000 Units Q8H       Review of Systems:   Pertinent positives stated above in HPI. All other systems were reviewed and were negative.   ROS:Constitutional: negative  Eyes: negative  Ears, nose, mouth, throat, and face: negative  Respiratory: negative  Cardiovascular: positive for irregular heart beat  Gastrointestinal: negative  Genitourinary:negative  Integument/breast: negative  Hematologic/lymphatic: negative  Musculoskeletal:positive for arthralgias and stiff joints  Neurological: positive for coordination problems and gait problems  Behavioral/Psych: negative  Endocrine: negative  Allergic/Immunologic: negative    Physical exam:   Constitutional:  Well-developed elderly lady in no obvious distress. Vitals:   Vitals:    08/28/18 1102   BP: 139/63   Pulse: 72   Resp: 18   Temp: 97.5 °F (36.4 °C)   SpO2: 98%       Skin: no rash, turgor wnl  Heent:  Pupils are reactive to light and accommodation. Throat is clear. Oral mucosa is moist.  Neck: no bruits or jvd noted  Cardiovascular: Irregularly irregular. Respiratory: Clear to auscultation without any wheezes, rhonchi or rales. Abdomen:  Soft. Bowel sounds. No palpable mass. No tenderness with palpation. No abdominal bruit. Ext: No lower extremity edema  Psychiatric: mood and affect appropriate  Musculoskeletal:  Rom, muscular strength intact  CNS: Very awake and very alert. Well oriented. Normal speech. She has good motor strength. No obvious focal deficit.     Data:   Labs:  Lab Results   Component Value Date     08/28/2018     08/27/2018     08/17/2018    K 4.5 08/28/2018    K 4.1 08/27/2018    K 5.0 08/17/2018     08/28/2018    CO2 24 08/28/2018    CO2 20 (L) 08/27/2018    CO2 23 08/17/2018    CREATININE 2.4 (H) 08/28/2018    CREATININE 2.3 (H) 08/27/2018    CREATININE 2.8 08/17/2018    BUN 93 (H) 08/28/2018    BUN 96 (H) 08/27/2018    BUN 88 08/17/2018    GLUCOSE 120 (H) 08/28/2018    GLUCOSE 155 (H) 08/27/2018    GLUCOSE 103 08/17/2018    PHOS 3.5 06/20/2018    PHOS 3.6 03/16/2018    PHOS 4.9 02/27/2018    WBC 7.4 08/28/2018    WBC 9.6 08/27/2018    WBC 5.0 07/30/2018    HGB 10.1 (L) 08/28/2018    HGB 11.7 (L) 08/27/2018    HGB 9.5 (L) 07/30/2018    HCT 30.6 (L) 08/28/2018    HCT 35.0 (L) 08/27/2018    HCT 29.3 (L) 07/30/2018    MCV 97.5 08/28/2018     08/28/2018     {Labs Reviewed    Imaging:  CXR results: Diagnostic images reports reviewed        Thank you Dr. Tiffanie Sutton MD for allowing us to participate in care of Chio Camargo       Electronically signed by Mari Dill MD on 8/28/2018 at 2:53 PM

## 2018-08-29 ENCOUNTER — APPOINTMENT (OUTPATIENT)
Dept: MRI IMAGING | Age: 83
DRG: 189 | End: 2018-08-29
Payer: MEDICARE

## 2018-08-29 ENCOUNTER — APPOINTMENT (OUTPATIENT)
Dept: CT IMAGING | Age: 83
DRG: 189 | End: 2018-08-29
Payer: MEDICARE

## 2018-08-29 LAB
ANION GAP SERPL CALCULATED.3IONS-SCNC: 14 MEQ/L (ref 8–16)
BUN BLDV-MCNC: 85 MG/DL (ref 7–22)
CALCIUM SERPL-MCNC: 9 MG/DL (ref 8.5–10.5)
CHLORIDE BLD-SCNC: 103 MEQ/L (ref 98–111)
CO2: 22 MEQ/L (ref 23–33)
CREAT SERPL-MCNC: 2.6 MG/DL (ref 0.4–1.2)
GFR SERPL CREATININE-BSD FRML MDRD: 17 ML/MIN/1.73M2
GLUCOSE BLD-MCNC: 101 MG/DL (ref 70–108)
GLUCOSE BLD-MCNC: 106 MG/DL (ref 70–108)
GLUCOSE BLD-MCNC: 151 MG/DL (ref 70–108)
GLUCOSE BLD-MCNC: 159 MG/DL (ref 70–108)
GLUCOSE BLD-MCNC: 172 MG/DL (ref 70–108)
POTASSIUM SERPL-SCNC: 4.6 MEQ/L (ref 3.5–5.2)
SODIUM BLD-SCNC: 139 MEQ/L (ref 135–145)
TSH SERPL DL<=0.05 MIU/L-ACNC: 0.15 UIU/ML (ref 0.4–4.2)

## 2018-08-29 PROCEDURE — 99232 SBSQ HOSP IP/OBS MODERATE 35: CPT | Performed by: INTERNAL MEDICINE

## 2018-08-29 PROCEDURE — 99223 1ST HOSP IP/OBS HIGH 75: CPT | Performed by: INTERNAL MEDICINE

## 2018-08-29 PROCEDURE — 36415 COLL VENOUS BLD VENIPUNCTURE: CPT

## 2018-08-29 PROCEDURE — 80048 BASIC METABOLIC PNL TOTAL CA: CPT

## 2018-08-29 PROCEDURE — 97116 GAIT TRAINING THERAPY: CPT

## 2018-08-29 PROCEDURE — 6370000000 HC RX 637 (ALT 250 FOR IP): Performed by: HOSPITALIST

## 2018-08-29 PROCEDURE — 94761 N-INVAS EAR/PLS OXIMETRY MLT: CPT

## 2018-08-29 PROCEDURE — 6370000000 HC RX 637 (ALT 250 FOR IP): Performed by: INTERNAL MEDICINE

## 2018-08-29 PROCEDURE — 2709999900 HC NON-CHARGEABLE SUPPLY

## 2018-08-29 PROCEDURE — 1200000003 HC TELEMETRY R&B

## 2018-08-29 PROCEDURE — 99233 SBSQ HOSP IP/OBS HIGH 50: CPT | Performed by: INTERNAL MEDICINE

## 2018-08-29 PROCEDURE — 6360000002 HC RX W HCPCS: Performed by: HOSPITALIST

## 2018-08-29 PROCEDURE — 97110 THERAPEUTIC EXERCISES: CPT

## 2018-08-29 PROCEDURE — 84443 ASSAY THYROID STIM HORMONE: CPT

## 2018-08-29 PROCEDURE — 2580000003 HC RX 258: Performed by: INTERNAL MEDICINE

## 2018-08-29 PROCEDURE — 70551 MRI BRAIN STEM W/O DYE: CPT

## 2018-08-29 PROCEDURE — 71250 CT THORAX DX C-: CPT

## 2018-08-29 PROCEDURE — 82948 REAGENT STRIP/BLOOD GLUCOSE: CPT

## 2018-08-29 RX ORDER — BUTALBITAL, ACETAMINOPHEN AND CAFFEINE 50; 325; 40 MG/1; MG/1; MG/1
1 TABLET ORAL EVERY 4 HOURS PRN
Status: DISCONTINUED | OUTPATIENT
Start: 2018-08-29 | End: 2018-08-31 | Stop reason: HOSPADM

## 2018-08-29 RX ADMIN — Medication 1 UNITS: at 22:55

## 2018-08-29 RX ADMIN — LOSARTAN POTASSIUM 100 MG: 100 TABLET, FILM COATED ORAL at 08:29

## 2018-08-29 RX ADMIN — METOPROLOL SUCCINATE 50 MG: 50 TABLET, FILM COATED, EXTENDED RELEASE ORAL at 06:58

## 2018-08-29 RX ADMIN — ALLOPURINOL 100 MG: 100 TABLET ORAL at 22:54

## 2018-08-29 RX ADMIN — SODIUM CHLORIDE, POTASSIUM CHLORIDE, SODIUM LACTATE AND CALCIUM CHLORIDE: 600; 310; 30; 20 INJECTION, SOLUTION INTRAVENOUS at 10:25

## 2018-08-29 RX ADMIN — Medication 1 UNITS: at 12:17

## 2018-08-29 RX ADMIN — ISOSORBIDE MONONITRATE 60 MG: 60 TABLET ORAL at 08:29

## 2018-08-29 RX ADMIN — CLOPIDOGREL BISULFATE 75 MG: 75 TABLET ORAL at 08:30

## 2018-08-29 RX ADMIN — OXYBUTYNIN CHLORIDE 10 MG: 10 TABLET, EXTENDED RELEASE ORAL at 08:29

## 2018-08-29 RX ADMIN — VITAMIN D, TAB 1000IU (100/BT) 5000 UNITS: 25 TAB at 08:29

## 2018-08-29 RX ADMIN — RANOLAZINE 500 MG: 500 TABLET, FILM COATED, EXTENDED RELEASE ORAL at 08:29

## 2018-08-29 RX ADMIN — ACETAMINOPHEN 650 MG: 325 TABLET ORAL at 10:24

## 2018-08-29 RX ADMIN — ASPIRIN 81 MG CHEWABLE TABLET 81 MG: 81 TABLET CHEWABLE at 08:30

## 2018-08-29 RX ADMIN — LEVOTHYROXINE SODIUM 150 MCG: 150 TABLET ORAL at 06:58

## 2018-08-29 RX ADMIN — Medication 1 UNITS: at 17:21

## 2018-08-29 RX ADMIN — FAMOTIDINE 20 MG: 20 TABLET ORAL at 08:30

## 2018-08-29 RX ADMIN — HYDRALAZINE HYDROCHLORIDE 100 MG: 50 TABLET ORAL at 22:54

## 2018-08-29 RX ADMIN — HYDRALAZINE HYDROCHLORIDE 100 MG: 50 TABLET ORAL at 08:29

## 2018-08-29 RX ADMIN — METOPROLOL SUCCINATE 50 MG: 50 TABLET, FILM COATED, EXTENDED RELEASE ORAL at 17:21

## 2018-08-29 RX ADMIN — HEPARIN SODIUM 5000 UNITS: 5000 INJECTION INTRAVENOUS; SUBCUTANEOUS at 17:21

## 2018-08-29 RX ADMIN — RANOLAZINE 500 MG: 500 TABLET, FILM COATED, EXTENDED RELEASE ORAL at 22:54

## 2018-08-29 RX ADMIN — HYDRALAZINE HYDROCHLORIDE 100 MG: 50 TABLET ORAL at 13:29

## 2018-08-29 RX ADMIN — HEPARIN SODIUM 5000 UNITS: 5000 INJECTION INTRAVENOUS; SUBCUTANEOUS at 08:31

## 2018-08-29 ASSESSMENT — PAIN SCALES - GENERAL
PAINLEVEL_OUTOF10: 0
PAINLEVEL_OUTOF10: 3
PAINLEVEL_OUTOF10: 0
PAINLEVEL_OUTOF10: 0

## 2018-08-29 ASSESSMENT — PAIN DESCRIPTION - DESCRIPTORS: DESCRIPTORS: HEADACHE

## 2018-08-29 ASSESSMENT — PAIN DESCRIPTION - LOCATION: LOCATION: HEAD

## 2018-08-29 ASSESSMENT — ENCOUNTER SYMPTOMS
DIARRHEA: 0
COLOR CHANGE: 0
STRIDOR: 0
CHOKING: 0
ABDOMINAL DISTENTION: 0
BLOOD IN STOOL: 0
ANAL BLEEDING: 0
NAUSEA: 0
SINUS PAIN: 0
RECTAL PAIN: 0
EYE REDNESS: 0
BACK PAIN: 0
VOICE CHANGE: 0
EYE ITCHING: 0
CHEST TIGHTNESS: 0
SHORTNESS OF BREATH: 0
VOMITING: 0
COUGH: 0
WHEEZING: 0
APNEA: 0
PHOTOPHOBIA: 0

## 2018-08-29 ASSESSMENT — PAIN DESCRIPTION - ORIENTATION: ORIENTATION: MID

## 2018-08-29 ASSESSMENT — PAIN DESCRIPTION - FREQUENCY: FREQUENCY: CONTINUOUS

## 2018-08-29 ASSESSMENT — PAIN DESCRIPTION - PROGRESSION: CLINICAL_PROGRESSION: GRADUALLY WORSENING

## 2018-08-29 ASSESSMENT — PAIN DESCRIPTION - PAIN TYPE: TYPE: ACUTE PAIN

## 2018-08-29 ASSESSMENT — PAIN DESCRIPTION - ONSET: ONSET: ON-GOING

## 2018-08-29 NOTE — CONSULTS
Mother         rheumatism    Arthritis Father         rheumatism    Cancer Brother         jaw    Diabetes Neg Hx     High Blood Pressure Neg Hx     Stroke Neg Hx        ROS    General/Constitutional: No recent loss of weight or appetite changes. No fever or chills. HENT: Negative. Eyes: Negative. Upper respiratory tract: No nasal stuffiness or post nasal drip. Lower respiratory tract/ lungs: WARNER, cough  Cardiovascular: No palpitations, chest pain or edema. Gastrointestinal: positive nausea  Neurological: No focal neurological weakness. Extremities: No tenderness. Musculoskeletal: multiple aches and pains  Genitourinary: No complaints. Hematological: Negative. Denies easy buising  Skin: No itching. Meds    Current Medications    aspirin  81 mg Oral Daily    vitamin D  5,000 Units Oral Daily    clopidogrel  75 mg Oral Daily    famotidine  20 mg Oral Daily    hydrALAZINE  100 mg Oral TID    allopurinol  100 mg Oral Nightly    isosorbide mononitrate  60 mg Oral Daily    levothyroxine  150 mcg Oral Daily    losartan  100 mg Oral Daily    oxybutynin  10 mg Oral Daily    ranolazine  500 mg Oral BID    metoprolol succinate  50 mg Oral BID    pneumococcal 13-valent conjugate  0.5 mL Intramuscular Once    sodium chloride flush  10 mL Intravenous 2 times per day    insulin lispro  0-6 Units Subcutaneous 4x Daily AC & HS    heparin (porcine)  5,000 Units Subcutaneous Q8H     ondansetron, glucose, dextrose, glucagon (rDNA), dextrose, sodium chloride flush, magnesium hydroxide, acetaminophen  IV Drips/Infusions   dextrose      lactated ringers 100 mL/hr at 08/28/18 1730     Vitals    Vitals    height is 5' 3\" (1.6 m) and weight is 191 lb 3.2 oz (86.7 kg). Her oral temperature is 98.2 °F (36.8 °C). Her blood pressure is 156/65 (abnormal) and her pulse is 60. Her respiration is 16 and oxygen saturation is 94%.      O2 Flow Rate (L/min): 3 L/min  I/O    Intake/Output Summary (Last 24 hours) at PROCAL 0.08 08/27/2018    PROCAL 0.07 04/30/2017    PROCAL < 0.05 12/23/2016        Radiology    CXR          (See actual reports for details)    Assessment   Dyspnea is multifactorial  Abnormal breath sounds, SOB, restrictive airway impairment by lung volumes  Episodic hyperventilation--normal V/Q lung scan  Diastolic CHF  TURNER/CKD  A fib  Obesity  Recommendations   HRCT chest to r/o ILD  Home oxygen eval before DC  Noted Anti CCP (-), BLADIMIR (-), Anti SSA/SSB, no need to repeat    Thank you for the consult and allowing us to participate in the care of your patient. Case discussed with nurse and patient/family. Questions and concerns addressed. Meds and Orders reviewed.     Electronically signed by     Mariaelena Pedersen MD on 8/29/2018 at 8:28 AM

## 2018-08-29 NOTE — CONSULTS
[address] [place] [address]                                   CONSULTATION    PATIENT NAME: Monica Ramirez                 :        1931  MED REC NO:   [id number]                           ROOM:       0021  ACCOUNT NO:   [de-identified]                           ADMIT DATE: 2018  PROVIDER:     Anna Crawford. Susan Greer M.D.    Marycruz Piper:  2018    REASON FOR CONSULTATION:  Elevated troponin in an 51-year-old female  patient presented with cough, nausea, vomiting and admitted for dehydration  and nausea and vomiting. HISTORY OF PRESENT ILLNESS:  This is an 51-year-old female patient known to  have coronary artery disease status post previous stent placement to the  emergency room yesterday because of shortness of breath and nausea and  vomiting. The patient has been having some shortness of breath for the  last one week in association with cough productive of yellowish sputum and  in the last three days she has been having nausea, vomiting, repeatedly  almost everyday. She has cough productive and so she has also decreased  urine output, denied any diarrhea or any abdominal pain. Denied chest  pain. She has fever or chills or palpitation. So, she went to the primary  care and was put on antibiotic last week for possible bronchitis, but she  took off for four days and due to possible reaction the patient has history  of atrial fibrillation, diabetes, and CHF and she has history of known  coronary artery disease, COPD. She has a history of known coronary artery  disease. The patient has history of chronic kidney disease, not on dialysis. REVIEW OF SYSTEMS:  Currently, overnight she has been hydrated very well  and has antiemetics now, no more vomiting or nausea today. Twelve review  of systems negative except the above-mentioned ones.     Cardiology evaluation was sought in view of mildly elevated abnormality. STUDIES:  BMP, BUN _____, creatinine 2.4, potassium 4.3, sodium within  normal limits. Cardiac enzymes, troponin is 0.047, 0.045 that area and  before also in January 0.051, no new change really. Chest x-ray, no acute  abnormality noted. Hematology, white blood cell 4.74, hemoglobin 10, and  platelets 256. Echocardiogram, 07/2018, ejection fraction of 55%. Mild  aortic stenosis present. Cardiac catheterization done in 07/2018, patent  stent, no obstructive lesion in the other arteries. BNP within normal  limits. ASSESSMENT:  1.  Nausea and vomiting associated with dehydration. 2.  Dehydration. 3.  Paroxysmal atrial fibrillation. The patient is in sinus rhythm now. 4.  Elevated troponin secondary to nausea, vomiting, and chronic kidney  disease. 5.  Chronic kidney disease, stage 3.  6.  Diabetes, type 2.  7.  Hypertension. 8.  Hyperlipidemia. 9.  Coronary artery disease post PCI of the LAD and PCI of the RCA in 2015. Overall, this is actually an 55-year-old female presented with nausea,  vomiting, and upper respiratory infection with possible bronchitis. So, basically called for troponin elevation. I believe troponin elevation  is chronic and also secondary to underlying chronic kidney disease. The  patient has some nausea and vomiting that could make the troponin elevation  a little over, but at this level she had a recent cardiac catheterization  done in 07/2018, so medical management was recommended. Stents were  patent. So at this level, the patient does not need any further ischemia  workup. PLAN AND RECOMMENDATIONS:  Continue the current medical therapy, control  the upper respiratory infection/bronchitis, hydration, and control  certainly the nausea and vomiting. No further ischemic or nonischemic  cardiac workup is recommended at this level and we will continue with  optimal medical therapy. Follow up Cardiology in one to two weeks as  outpatient.     I discussed with the patient and the family the plan of care. Thank you  for allowing me to participate in the care of this patient. I will follow  up with you. Migdalia Alvarado.  Josh Carmona M.D.    D: 08/28/2018 16:35:06       T: 08/28/2018 16:38:54     [email or web address]  Job#: [id number]     Doc#: [id number]    CC:

## 2018-08-29 NOTE — PROGRESS NOTES
An overnight nocturnal pulse oximetry study was set up on patient. The assigned GE number of the pulse oximetry was [id number]. A log sheet was completed. A patient task was placed in the patients chart for the  to download the nocturnal study and fax the results to the ordering provider for interpretation. The pulse oximetrys memory was cleared. Heart rate on monitor was 73 beats per minute. SpO2 was 94%. A good waveform was detected.

## 2018-08-29 NOTE — PROGRESS NOTES
Renal Progress Note    Assessment and Plan: 1. Acute kidney injury from volume contraction with  serum creatinine  slightly increased today from yesterday. Helena Waters is improved today from yesterday. 2.  Hypertension with variable control  3. Atrial fibrillation with controlled ventricular rate  4. Metabolic acidosis from acute kidney injury  5.   Normocytic anemia  PLAN:   reviewed labs results discussed with the patient  Serum creatinine is slightly increased today  BUN is improved  Medications reviewed  No changes  Continue current intravenous fluid  BMP in the morning  Discussed with the patient and staff    Patient Active Problem List:     Prerenal acute renal failure ([address]     Essential hypertension     Atrial fib/flutter, transient     DJD (degenerative joint disease)     Renal artery stenosis ([facility])     HLD (hyperlipidemia)     DM type 2 (diabetes mellitus, type 2) ([facility])     Anemia     CKD (chronic kidney disease) stage 3, GFR 30-59 ml/min     Coronary artery disease without angina pectoris     Headache     Abnormal EKG     Cardiovascular risk factor     Angina, class II ([facility])     DM (diabetes mellitus) ([address]     Dyslipidemia     S/P angioplasty with stent     Presence of stent in left circumflex coronary artery     Presence of stent in right coronary artery     Type 2 diabetes mellitus with complication ([facility])     Renovascular hypertension     Hypertensive crisis     TURNER (acute kidney injury) ([address]     DVT (deep venous thrombosis) ([facility])     Urge incontinence of urine     Hypertensive emergency     Hypertensive heart disease with heart failure ([facility])     Acute on chronic congestive heart failure ([facility])     CKD (chronic kidney disease)     Coronary artery disease involving native coronary artery of native heart without angina pectoris     Acute diastolic congestive heart failure ([address]     Accelerated hypertension     Pneumonia of both lungs due to infectious organism     Wheezing     Interstitial 9.6  7.4   HGB  11.7*  10.1*   PLT  240  196     CMP:  Recent Labs      08/27/18   1707 08/28/18   0533  08/29/18   0346   NA  141  142  139   K  4.1  4.5  4.6   CL  100  104  103   CO2  20*  24  22*   BUN  96*  93*  85*   CREATININE  2.3*  2.4*  2.6*   GLUCOSE  155*  120*  106   CALCIUM  9.8  9.0  9.0   LABGLOM  20*  19*  17*     Troponin: No results for input(s): TROPONINI in the last 72 hours. BNP: No results for input(s): BNP in the last 72 hours. INR:   Recent Labs      08/28/18   0533   INR  1.02     Lipids: Recent Labs      08/27/18 1707 08/28/18   0533   CHOL   --   202*   TRIG   --   160   HDL   --   33   LIPASE  145.2*   --      Liver: Recent Labs      08/28/18   0533   AST  11   ALT  <5*   ALKPHOS  56   PROT  6.3   LABALBU  3.5   BILITOT  0.7     Iron:  No results for input(s): IRONS, FERRITIN in the last 72 hours. Invalid input(s): LABIRONS    Objective:   Vitals: BP (!) 154/68   Pulse 56   Temp 98.2 °F (36.8 °C) (Oral)   Resp 16   Ht 5' 3\" (1.6 m)   Wt 191 lb 3.2 oz (86.7 kg)   SpO2 96%   BMI 33.87 kg/m²    Wt Readings from Last 3 Encounters:   08/29/18 191 lb 3.2 oz (86.7 kg)   08/15/18 191 lb 6 oz (86.8 kg)   07/29/18 198 lb (89.8 kg)      24HR INTAKE/OUTPUT:    Intake/Output Summary (Last 24 hours) at 08/29/18 1458  Last data filed at 08/29/18 0750   Gross per 24 hour   Intake           1636.1 ml   Output             1050 ml   Net            586.1 ml       Constitutional:  Alert, awake, no apparent distress   Skin:normal   HEENT:Pupils are reactive . Throat is clear   Neck:supple with no thyromegaly  Cardiovascular: Irregular  Respiratory: Clear to auscultation  Abdomen: Soft. Good bowel sounds. No tenderness with palpation.   Ext: No LE edema  Musculoskeletal:Intact  Neuro:Alert and oriented with no deficit      Electronically signed by Claire Fan MD on 8/29/2018 at 2:58 PM

## 2018-08-29 NOTE — PROGRESS NOTES
Physical Therapy   [address] [address]  INPATIENT PHYSICAL THERAPY  DAILY NOTE  [facility] ICU STEPDOWN TELEMETRY 4K - 4K-21/021-A    Time In: 0740  Time Out: 0805  Timed Code Treatment Minutes: 25 Minutes  Minutes: 25          Date: 2018  Patient Name: Ester Joshua,  Gender:  female        MRN: [id number]  : 1931  (80 y.o.)     Referring Practitioner: Helena Romo MD  Diagnosis: SOB (shortness of breath)  Additional Pertinent Hx: Per ED note, pt is a 80 y.o. female who presents to the ED with daughter via EMS for the evaluation of shortness of breath onset 1 week ago. She reports associated nausea, emesis, fatigue, productive cough and decreased urine output. She denies diarrhea, abdominal pain, constipation, chest pain, chills or palpitations. Patient was told 10 days ago by Dr. Susie Church, who she sees for hx of gout, that she needed blood work done. Patient was told today by her PCP that her creatine was elevated and was sent here. Per daughter, the patient was put on Abx last week for possible bronchitis, but was taken off them 4 days ago due to a possible reaction. The patient is not on dialysis. Daughter states patient follows with Dr. Corrine Espinosa, Nephrology. The patient had a pulmonary function test 3 days ago, but patient and daughter do not know results. The patient is currently on aspirin, Plavix, lasix, losartan, imdur, and toprol .      Past Medical History:   Diagnosis Date    Anemia     Atrial fib/flutter, transient 2000    Atrial fibrillation (HCC)     CHF (congestive heart failure) (HCC)     DJD (degenerative joint disease)     DM [facility], [facility]     Gout     HLD (hyperlipidemia)     HTN (hypertension)     Hypothyroidism     Incontinence     Obesity     Pneumonia     Renal artery stenosis (HCC)     Renal insufficiency      Past Surgical History:   Procedure Laterality Date    ACHILLES TENDON SURGERY  3/2004   [facility] ANUS SURGERY   Gait: slow steady pace, fwd trunk flex, good foot clearance, lat lean to left  Distance: 10ft x1, 60ft x1, one standing rest break d/t SOB, correct breathing techniques given  Comments: increased pain in UEs noted, pt states \" I could go farther if my hands and wrists didn't ache\", reports using forearms on RW at home. Discussed proper RW use and safety. Balance  Sitting - Static: Good  Sitting - Dynamic: Good  Standing - Dynamic: Fair  Comments: Pt stood in bathroom for alessandra clean and hand washing, SBA, no LOB, slight sway, x5 min approx    Exercises:  Exercises  Comments: Pt completed seated edge of chair exs; marches, long arc quads, ankle pumps, and reclined hip abd/add and quad sets, and heel slides. Each x 12 reps for strengthening to improve functional mobility. Activity Tolerance:  Activity Tolerance: Patient Tolerated treatment well;Patient limited by fatigue;Patient limited by endurance    Assessment: Body structures, Functions, Activity limitations: Decreased functional mobility , Decreased endurance, Decreased strength  Assessment: Pt tolerated session fairly well, with no c/o pain in beginning of session, tolerated increased reps with ther ex. Would benefit from continued skilled PT for strengthening, transfers and gait training to return to prior level of function for return to home. Prognosis: Excellent     REQUIRES PT FOLLOW UP: Yes  Discharge Recommendations: Continue to assess pending progress, Home with Home health PT    Patient Education:  Patient Education: plan of care and LE exercises    Equipment Recommendations: Other: monitor for needs    Safety:  Type of devices:  All fall risk precautions in place, Left in chair, Call light within reach, Chair alarm in place, Gait belt, Patient at risk for falls, Nurse notified    Plan:  Times per week: 5x GM  Times per day: Daily  Specific instructions for Next Treatment: ther ex, ther act, gait trng, functional mobility  Current Treatment

## 2018-08-29 NOTE — PROGRESS NOTES
Hospitalist Progress Note  [facility] ICU STEPDOWN TELEMETRY 4K       Patient: Brady Case  Unit/Bed: 7K-84/498-L  YOB: 1931  MRN: [id number]  Acct: [de-identified]   Admitting Diagnosis: SOB (shortness of breath) [R06.02]  Admit Date:  8/27/2018  Hospital Day: 2    Subjective:    Patient is feeling fine - Has a generalized headache 5/10 w/ pressure. Presented w/ a 3 days h/o N/V prior to admission. Patient Seen, Chart, Labs, Radiology studies, and Consults reviewed. Objective:   BP (!) 143/64   Pulse 76   Temp 98.3 °F (36.8 °C) (Oral)   Resp 16   Ht 5' 3\" (1.6 m)   Wt 191 lb 3.2 oz (86.7 kg)   SpO2 90%   BMI 33.87 kg/m²       Intake/Output Summary (Last 24 hours) at 08/29/18 0746  Last data filed at 08/29/18 0342   Gross per 24 hour   Intake           2216.1 ml   Output             1050 ml   Net           1166.1 ml     Review of Systems   Constitutional: Positive for activity change. Negative for chills, fatigue, fever and unexpected weight change. HENT: Negative for congestion, drooling, ear pain, hearing loss, mouth sores, postnasal drip, sinus pain, sneezing, tinnitus and voice change. Eyes: Negative for photophobia, redness, itching and visual disturbance. Respiratory: Negative for apnea, cough, choking, chest tightness, shortness of breath, wheezing and stridor. Cardiovascular: Negative for chest pain, palpitations and leg swelling. Gastrointestinal: Negative for abdominal distention, anal bleeding, blood in stool, diarrhea, nausea, rectal pain and vomiting. Endocrine: Negative for heat intolerance, polydipsia, polyphagia and polyuria. Genitourinary: Negative for difficulty urinating, dysuria, flank pain, genital sores, menstrual problem, urgency, vaginal bleeding, vaginal discharge and vaginal pain. Musculoskeletal: Negative for back pain, joint swelling, neck pain and neck stiffness. Skin: Negative for color change, pallor and wound.    Allergic/Immunologic: affect. Her behavior is normal. Judgment and thought content normal.   Nursing note and vitals reviewed. Medications:    aspirin  81 mg Oral Daily    vitamin D  5,000 Units Oral Daily    clopidogrel  75 mg Oral Daily    famotidine  20 mg Oral Daily    hydrALAZINE  100 mg Oral TID    allopurinol  100 mg Oral Nightly    isosorbide mononitrate  60 mg Oral Daily    levothyroxine  150 mcg Oral Daily    losartan  100 mg Oral Daily    oxybutynin  10 mg Oral Daily    ranolazine  500 mg Oral BID    metoprolol succinate  50 mg Oral BID    pneumococcal 13-valent conjugate  0.5 mL Intramuscular Once    sodium chloride flush  10 mL Intravenous 2 times per day    insulin lispro  0-6 Units Subcutaneous 4x Daily AC & HS    heparin (porcine)  5,000 Units Subcutaneous Q8H     Continuous Infusions:   dextrose      lactated ringers 100 mL/hr at 08/28/18 1730     PRN Meds:ondansetron, glucose, dextrose, glucagon (rDNA), dextrose, sodium chloride flush, magnesium hydroxide, acetaminophen    Data:  CBC:   Recent Labs      08/27/18   1707  08/28/18   0533   WBC  9.6  7.4   RBC  3.63*  3.14*   HGB  11.7*  10.1*   HCT  35.0*  30.6*   MCV  96.4  97.5   PLT  240  196     BMP:   Recent Labs      08/27/18   1707  08/28/18   0533  08/29/18   0346   NA  141  142  139   K  4.1  4.5  4.6   CL  100  104  103   CO2  20*  24  22*   BUN  96*  93*  85*   CREATININE  2.3*  2.4*  2.6*     BNP: No results for input(s): BNP in the last 72 hours. PT/INR:   Recent Labs      08/28/18   0533   INR  1.02     APTT: No results for input(s): APTT in the last 72 hours.   CARDIAC ENZYMES:   Recent Labs      08/28/18   0017  08/28/18   0533  08/28/18   1525   TROPONINT  0.037*  0.041*  0.045*     FASTING LIPID PANEL:  Lab Results   Component Value Date    CHOL 202 (H) 08/28/2018    HDL 33 08/28/2018    TRIG 160 08/28/2018     LIVER PROFILE:   Recent Labs      08/27/18   1707  08/28/18   0533   AST  12  11   ALT  <5*  <5*   BILIDIR  <0.2   -- BILITOT  0.9  0.7   ALKPHOS  66  56      ABGs:   Lab Results   Component Value Date    PH 7.36 08/27/2018    PCO2 42 08/27/2018    PO2 84 08/27/2018    HCO3 24 08/27/2018    O2SAT 96 08/27/2018     Results for Darnell Salazar (MRN [id number]) as of 8/29/2018 07:45   Ref. Range 8/28/2018 18:56   D-Dimer, Quant Latest Ref Range: 0.00 - 500.0 ng/ml FEU 1067.00 (H)     MICROBIOLOGY & HISTOPATHOLOGY. Results for Darnell Salazar (MRN [id number]) as of 8/28/2018 13:12   Ref. Range 8/28/2018 00:07   Color, UA Latest Ref Range: STRAW-YELL  YELLOW   Glucose, UA Latest Ref Range: NEGATIVE mg/dl NEGATIVE   Bilirubin, Urine Latest Ref Range: NEGATIVE  NEGATIVE   Ketones, Urine Latest Ref Range: NEGATIVE  NEGATIVE   Blood, Urine Latest Ref Range: NEGATIVE  NEGATIVE   pH, UA Latest Ref Range: 5.0 - 9.0  5.0   Protein, UA Latest Ref Range: NEGATIVE  30 (A)   Urobilinogen, Urine Latest Ref Range: 0.0 - 1.0 eu/dl 0.2   Nitrite, Urine Latest Ref Range: NEGATIVE  NEGATIVE   Leukocyte Esterase, Urine Latest Ref Range: NEGATIVE  NEGATIVE   Casts UA Latest Ref Range: NONE SEEN /lpf NONE SEEN   CASTS 2 Latest Ref Range: NONE SEEN /lpf NONE SEEN   WBC, UA Latest Ref Range: 0-4/hpf /hpf 0-2   RBC, UA Latest Ref Range: 0-2/hpf /hpf NONE SEEN   Epi Cells Latest Ref Range: 3-5/hpf /hpf 0-2   Renal Epithelial, Urine Latest Ref Range: NONE SEEN  NONE SEEN   Bacteria, UA Latest Ref Range: FEW/NONE S /hpf NONE   Yeast, Urine Latest Ref Range: NONE SEEN  NONE SEEN   Crystals Latest Ref Range: NONE SEEN  NONE SEEN   Character, Urine Latest Ref Range: CLEAR-SL C  CLEAR   Specific Gravity, Urine Latest Ref Range: 1.002 - 1.03  1.016     TOXICOLOGY. None. ENDOSCOPE STUDIES. None. PROCEDURES. CARDIAC CATH-7/18/2018. Procedure Summary  Mild ds in LAD/RCA/Lcx. Diffuse calcification in coronary arteries. LAD and RCA stents are patent. Ostial Diag 1 and ostial Diag 2 have 80% stenosis - Small size vessels.   Mild-moderate pulmonary hypertension.     Bernadette Dang MD    RADIOLOGY. VQ SCAN-8/27/2018. 1. Low probability for pulmonary artery embolism. CT A/P-8/27/2018. 1. Stable postsurgical changes of the lumbar spine. 2. Chronic degenerative skeletal spondylosis and atherosclerotic disease without interval change. 3. Colonic diverticulosis without diverticulitis or evidence of inflammatory process. 4. Stable small bilateral renal cysts without obstructive uropathy. 5. Small hiatal hernia. CXR-8/27/2018. No acute findings. ECHO-7/24/2018. Left ventricle size is normal.  Mildly increased left ventricle wall thickness. Mild concentric left ventricular hypertrophy. Systolic function was normal.  Ejection fraction is visually estimated at 55%. Doppler parameters were consistent with abnormal left ventricular relaxation (grade 1 diastolic dysfunction). The left atrium is Moderately dilated. Atrial Septal defect could not be ruled out. Mild tricuspid regurgitation visualized. Mild mitral regurgitation is present. Aortic valve appears tricuspid. Leaflets exhibited moderately increased thickness and mildly reduced cuspal separation of the   aortic valve. Aortic valve leaflets are Moderately calcified. Mild aortic stenosis is present. There was Trivial aortic regurgitation. MRI -C SPINE 6/1/2015. 1.Moderate to severe central spinal stenosis at C5-6 with mild cord impingement. 2.Mild central spinal stenosis at C6-7 with mild ventral cord impingement. 3.Multilevel mild to moderate bilateral foraminal stenosis. 4.Degenerative disc disease and spondylosis. ASSESSMENT AND  PLAN. 1.NEUROVASCULAR. PERSISTENT HEADACHE-BRAIN MRI-FIORICET. C5-C7 SPINAL STENOSIS-6/1/2015. 2.PULMONARY. ACUTE HYPOXEMIA-O2 98 % ON 2L NC. IMPROVED. ? DAE W/ NOCTURNAL HYPOXEMIA-OVERNIGHT PULSE OXIMETRY. MILD TO MODERATE PULMONARY HTN. CHRONIC D DIMER ELEVATION-VQ SCAN LOW PROBABILITY FOR PE.      PULMONARY

## 2018-08-30 LAB
ANION GAP SERPL CALCULATED.3IONS-SCNC: 13 MEQ/L (ref 8–16)
BUN BLDV-MCNC: 68 MG/DL (ref 7–22)
CALCIUM SERPL-MCNC: 9.8 MG/DL (ref 8.5–10.5)
CHLORIDE BLD-SCNC: 104 MEQ/L (ref 98–111)
CO2: 23 MEQ/L (ref 23–33)
CREAT SERPL-MCNC: 2.3 MG/DL (ref 0.4–1.2)
GFR SERPL CREATININE-BSD FRML MDRD: 20 ML/MIN/1.73M2
GLUCOSE BLD-MCNC: 128 MG/DL (ref 70–108)
GLUCOSE BLD-MCNC: 137 MG/DL (ref 70–108)
GLUCOSE BLD-MCNC: 138 MG/DL (ref 70–108)
GLUCOSE BLD-MCNC: 148 MG/DL (ref 70–108)
GLUCOSE BLD-MCNC: 172 MG/DL (ref 70–108)
POTASSIUM SERPL-SCNC: 4.8 MEQ/L (ref 3.5–5.2)
SODIUM BLD-SCNC: 140 MEQ/L (ref 135–145)

## 2018-08-30 PROCEDURE — 6370000000 HC RX 637 (ALT 250 FOR IP): Performed by: NURSE PRACTITIONER

## 2018-08-30 PROCEDURE — 1200000003 HC TELEMETRY R&B

## 2018-08-30 PROCEDURE — 86200 CCP ANTIBODY: CPT

## 2018-08-30 PROCEDURE — 99232 SBSQ HOSP IP/OBS MODERATE 35: CPT | Performed by: INTERNAL MEDICINE

## 2018-08-30 PROCEDURE — 2580000003 HC RX 258: Performed by: INTERNAL MEDICINE

## 2018-08-30 PROCEDURE — 86038 ANTINUCLEAR ANTIBODIES: CPT

## 2018-08-30 PROCEDURE — 99999 PR OFFICE/OUTPT VISIT,PROCEDURE ONLY: CPT | Performed by: NURSE PRACTITIONER

## 2018-08-30 PROCEDURE — 82948 REAGENT STRIP/BLOOD GLUCOSE: CPT

## 2018-08-30 PROCEDURE — 6370000000 HC RX 637 (ALT 250 FOR IP): Performed by: INTERNAL MEDICINE

## 2018-08-30 PROCEDURE — 97530 THERAPEUTIC ACTIVITIES: CPT

## 2018-08-30 PROCEDURE — 6360000002 HC RX W HCPCS: Performed by: HOSPITALIST

## 2018-08-30 PROCEDURE — APPSS30 APP SPLIT SHARED TIME 16-30 MINUTES: Performed by: NURSE PRACTITIONER

## 2018-08-30 PROCEDURE — 80048 BASIC METABOLIC PNL TOTAL CA: CPT

## 2018-08-30 PROCEDURE — 36415 COLL VENOUS BLD VENIPUNCTURE: CPT

## 2018-08-30 PROCEDURE — 6370000000 HC RX 637 (ALT 250 FOR IP): Performed by: HOSPITALIST

## 2018-08-30 PROCEDURE — 99233 SBSQ HOSP IP/OBS HIGH 50: CPT | Performed by: INTERNAL MEDICINE

## 2018-08-30 PROCEDURE — A6250 SKIN SEAL PROTECT MOISTURIZR: HCPCS

## 2018-08-30 PROCEDURE — 2580000003 HC RX 258: Performed by: NURSE PRACTITIONER

## 2018-08-30 PROCEDURE — 2709999900 HC NON-CHARGEABLE SUPPLY

## 2018-08-30 PROCEDURE — 97116 GAIT TRAINING THERAPY: CPT

## 2018-08-30 RX ORDER — HYDRALAZINE HYDROCHLORIDE 20 MG/ML
10 INJECTION INTRAMUSCULAR; INTRAVENOUS EVERY 6 HOURS PRN
Status: DISCONTINUED | OUTPATIENT
Start: 2018-08-30 | End: 2018-08-31 | Stop reason: HOSPADM

## 2018-08-30 RX ORDER — LEVOTHYROXINE SODIUM 112 UG/1
112 TABLET ORAL DAILY
Status: DISCONTINUED | OUTPATIENT
Start: 2018-08-31 | End: 2018-08-31 | Stop reason: HOSPADM

## 2018-08-30 RX ORDER — LACTULOSE 10 G/15ML
30 SOLUTION ORAL 2 TIMES DAILY
Status: DISCONTINUED | OUTPATIENT
Start: 2018-08-30 | End: 2018-08-31

## 2018-08-30 RX ORDER — DOXAZOSIN MESYLATE 4 MG/1
2 TABLET ORAL DAILY
Status: DISCONTINUED | OUTPATIENT
Start: 2018-08-30 | End: 2018-08-31

## 2018-08-30 RX ADMIN — LEVOTHYROXINE SODIUM 150 MCG: 150 TABLET ORAL at 06:27

## 2018-08-30 RX ADMIN — CLOPIDOGREL BISULFATE 75 MG: 75 TABLET ORAL at 08:27

## 2018-08-30 RX ADMIN — LACTULOSE 30 G: 20 SOLUTION ORAL at 10:05

## 2018-08-30 RX ADMIN — ALLOPURINOL 100 MG: 100 TABLET ORAL at 20:29

## 2018-08-30 RX ADMIN — VITAMIN D, TAB 1000IU (100/BT) 5000 UNITS: 25 TAB at 08:27

## 2018-08-30 RX ADMIN — ISOSORBIDE MONONITRATE 60 MG: 60 TABLET ORAL at 08:27

## 2018-08-30 RX ADMIN — RANOLAZINE 500 MG: 500 TABLET, FILM COATED, EXTENDED RELEASE ORAL at 08:26

## 2018-08-30 RX ADMIN — HEPARIN SODIUM 5000 UNITS: 5000 INJECTION INTRAVENOUS; SUBCUTANEOUS at 01:47

## 2018-08-30 RX ADMIN — HYDRALAZINE HYDROCHLORIDE 10 MG: 20 INJECTION INTRAMUSCULAR; INTRAVENOUS at 05:13

## 2018-08-30 RX ADMIN — METOPROLOL SUCCINATE 50 MG: 50 TABLET, FILM COATED, EXTENDED RELEASE ORAL at 05:47

## 2018-08-30 RX ADMIN — LOSARTAN POTASSIUM 100 MG: 100 TABLET, FILM COATED ORAL at 08:27

## 2018-08-30 RX ADMIN — Medication 1 UNITS: at 20:29

## 2018-08-30 RX ADMIN — ONDANSETRON 4 MG: 4 TABLET, ORALLY DISINTEGRATING ORAL at 09:39

## 2018-08-30 RX ADMIN — Medication 1 UNITS: at 17:10

## 2018-08-30 RX ADMIN — RANOLAZINE 500 MG: 500 TABLET, FILM COATED, EXTENDED RELEASE ORAL at 20:29

## 2018-08-30 RX ADMIN — HYDRALAZINE HYDROCHLORIDE 100 MG: 50 TABLET ORAL at 15:27

## 2018-08-30 RX ADMIN — HEPARIN SODIUM 5000 UNITS: 5000 INJECTION INTRAVENOUS; SUBCUTANEOUS at 17:08

## 2018-08-30 RX ADMIN — ACETAMINOPHEN 650 MG: 325 TABLET ORAL at 08:26

## 2018-08-30 RX ADMIN — HEPARIN SODIUM 5000 UNITS: 5000 INJECTION INTRAVENOUS; SUBCUTANEOUS at 08:26

## 2018-08-30 RX ADMIN — ACETAMINOPHEN 650 MG: 325 TABLET ORAL at 04:12

## 2018-08-30 RX ADMIN — METOPROLOL SUCCINATE 50 MG: 50 TABLET, FILM COATED, EXTENDED RELEASE ORAL at 17:10

## 2018-08-30 RX ADMIN — SODIUM CHLORIDE, POTASSIUM CHLORIDE, SODIUM LACTATE AND CALCIUM CHLORIDE: 600; 310; 30; 20 INJECTION, SOLUTION INTRAVENOUS at 01:10

## 2018-08-30 RX ADMIN — OXYBUTYNIN CHLORIDE 10 MG: 10 TABLET, EXTENDED RELEASE ORAL at 08:26

## 2018-08-30 RX ADMIN — HYDRALAZINE HYDROCHLORIDE 100 MG: 50 TABLET ORAL at 20:29

## 2018-08-30 RX ADMIN — ASPIRIN 81 MG CHEWABLE TABLET 81 MG: 81 TABLET CHEWABLE at 08:27

## 2018-08-30 RX ADMIN — DOXAZOSIN 2 MG: 4 TABLET ORAL at 10:05

## 2018-08-30 RX ADMIN — LACTULOSE 30 G: 20 SOLUTION ORAL at 22:47

## 2018-08-30 RX ADMIN — SODIUM CHLORIDE, POTASSIUM CHLORIDE, SODIUM LACTATE AND CALCIUM CHLORIDE: 600; 310; 30; 20 INJECTION, SOLUTION INTRAVENOUS at 17:08

## 2018-08-30 RX ADMIN — HYDRALAZINE HYDROCHLORIDE 100 MG: 50 TABLET ORAL at 08:27

## 2018-08-30 ASSESSMENT — PAIN SCALES - GENERAL
PAINLEVEL_OUTOF10: 0
PAINLEVEL_OUTOF10: 3
PAINLEVEL_OUTOF10: 3
PAINLEVEL_OUTOF10: 5
PAINLEVEL_OUTOF10: 0
PAINLEVEL_OUTOF10: 3
PAINLEVEL_OUTOF10: 0
PAINLEVEL_OUTOF10: 5
PAINLEVEL_OUTOF10: 0

## 2018-08-30 ASSESSMENT — ENCOUNTER SYMPTOMS
NAUSEA: 0
ABDOMINAL DISTENTION: 0
COLOR CHANGE: 0
STRIDOR: 0
EYE ITCHING: 0
EYE REDNESS: 0
DIARRHEA: 0
SINUS PAIN: 0
RECTAL PAIN: 0
PHOTOPHOBIA: 0
ANAL BLEEDING: 0
COUGH: 0
CHEST TIGHTNESS: 0
SHORTNESS OF BREATH: 0
CHOKING: 0
VOICE CHANGE: 0
VOMITING: 0
WHEEZING: 0
APNEA: 0
BLOOD IN STOOL: 0
BACK PAIN: 0

## 2018-08-30 ASSESSMENT — PAIN DESCRIPTION - DESCRIPTORS: DESCRIPTORS: HEADACHE

## 2018-08-30 ASSESSMENT — PAIN DESCRIPTION - LOCATION: LOCATION: HEAD

## 2018-08-30 ASSESSMENT — PAIN DESCRIPTION - ORIENTATION: ORIENTATION: POSTERIOR

## 2018-08-30 ASSESSMENT — PAIN DESCRIPTION - PAIN TYPE: TYPE: ACUTE PAIN

## 2018-08-30 NOTE — PROGRESS NOTES
Nutrition Assessment    Type and Reason for Visit: Reassess    Nutrition Recommendations: Continue current diet, ONS. Recommend MVI. Malnutrition Assessment:  · Malnutrition Status: Meets the criteria for moderate malnutrition  · Context: Acute illness or injury  · Findings of the 6 clinical characteristics of malnutrition (Minimum of 2 out of 6 clinical characteristics is required to make the diagnosis of moderate or severe Protein Calorie Malnutrition based on AND/ASPEN Guidelines):  1. Energy Intake-Less than or equal to 75%, greater than 7 days    2. Fat Loss-Mild subcutaneous fat loss, Orbital  3. Muscle Loss-Mild muscle mass loss, Temples (temporalis muscle)    Nutrition Diagnosis:   · Problem: Moderate malnutrition  · Etiology: related to Insufficient energy/nutrient consumption     Signs and symptoms:  as evidenced by Diet history of poor intake (mild subcutaneous fat loss, mild muscle loss)    Nutrition Assessment:  · Subjective Assessment:  Pt. Seen - reports appetite is \"not real good\". Reports some nausea. C/o constipation. Lactulose started. Rx also includes Zofran. 8/30: BUN 68, Cr 2.3, Glucose 128 mg/dl. Pt. States Glucerna is \"not bad\". Mentions mouth is dry - tries to drink liquid with meals. Denies need for low sodium gravies to moisten food.     · Wound Type: None  · Current Nutrition Therapies:  · Oral Diet Orders: Carb Control 4 Carbs/Meal   · Oral Diet intake: 1-25%, 51-75%, %  · Oral Nutrition Supplement (ONS) Orders: Diabetic Oral Supplement (once/day)  · ONS intake:  (reports acceptance)  · Anthropometric Measures:  · Ht: 5' 3\" (160 cm)   · Current Body Wt: 192 lb 9.6 oz (87.4 kg) (8/30, trace edema)  · Admission Body Wt: 184 lb 8 oz (83.7 kg) (8/27, trace edema)  · Usual Body Wt:  (~190# per pt; per EMR: 8/15/18: 191# 6 oz, 6/18: 205# 12.8 oz (unsure of edema or not))  · % Weight Change: -2.4%,  2 weeks (question at least partially d/t fluid)  · Ideal Body Wt: 115 lb (52.2 kg),   · BMI Classification: BMI 30.0 - 34.9 Obese Class I  · Comparative Standards (Estimated Nutrition Needs):  · Estimated Daily Total Kcal: 5681-1730 kcals  · Estimated Daily Protein (g): 42-52 gm (as renal function allows)    Estimated Intake vs Estimated Needs: Intake Less Than Needs    Nutrition Risk Level: Moderate    Nutrition Interventions:   Continue current diet, Continue current ONS  Continued Inpatient Monitoring, Education Initiated, Coordination of Care (8/28 Encouraged continued compliance with diabetic diet. Encouraged low sodium diet. Discussed appropriate use of ONS if intake poor.)    Nutrition Evaluation:   · Evaluation: Progressing toward goals   · Goals: Pt. will consume 75% or more of meals during LOS. · Monitoring: Meal Intake, Supplement Intake, Diet Tolerance, Ascites/Edema, Weight, Pertinent Labs, Patient/Family Education    See Adult Nutrition Doc Flowsheet for more detail.      Electronically signed by Nori Palacios RD, LD on 8/30/18 at 12:20 PM    Contact Number: [phone]

## 2018-08-30 NOTE — PROGRESS NOTES
[facility]    Patient - Norman Madison   MRN -  [id number]   [facility]   - 1931      Date of Admission -  2018  3:57 PM  Date of evaluation -  2018  Room - [facility] Day - 3  Consulting - Zulema Espinoza MD Primary Care Physician - Joss Salcedo MD   Chief Complaint   SOB  Active Hospital Problem List      Active Hospital Problems    Diagnosis Date Noted    Metabolic acidosis [L50.3]     Moderate malnutrition ([facility] [address] [E44.0] 2018     Class: Acute    Chronic diastolic congestive heart failure (HCC) [I50.32] 2018    Class 1 obesity in adult [E66.9] 2018    Acute respiratory failure with hypoxia (HCC) [J96.01] 2018    Acute renal failure superimposed on stage 3 chronic kidney disease (HCC) [N17.9, N18.3] 2018    Elevated troponin [R74.8]     Coronary artery disease without angina pectoris [I25.10]     DJD (degenerative joint disease) [M19.90]     DM type 2 (diabetes mellitus, type 2) ([facility] [address] [E11.9]      HPI   Norman Madison is a 80 y.o. female  Admitted via ED where she presented referred by hewr PCP due to elevated Cr,  noted SOB and hyperventilation responsive to Lorazepam--CXR, V/Q scan not revealing   Extensive pmh: CKD, gout, A fib, CHF, DM, renal artery stenosis  Non smoker, denies h/o asthma  H/O SOB is not new, previous episodes of acute hyperventilation recently seen by Laurice Osler for same, had PFTs done with difficulty due to hyperventilation. Extensive rheumatological w/u negative except gout.  And inflammatory polyarthritis     Past 24 hour events/ROS  Up in chair  Dyspnea is unchanged  On RA  Denies cough  HRCT of chest revealed atelectasis and 6 mm ground glass nodule of RLLL  Past Medical History         Diagnosis Date    Anemia     Atrial fib/flutter, transient 2000    Atrial fibrillation (HCC)     CHF (congestive heart failure) (HCC)     DJD (degenerative joint disease)     DM type 2 (diabetes mellitus, type 2) ([facility] [address]     Gout     HLD (hyperlipidemia)     HTN (hypertension)     Hypothyroidism     Incontinence     Obesity     Pneumonia     Renal artery stenosis (HCC)     Renal insufficiency       Past Surgical History           Procedure Laterality Date    ACHILLES TENDON SURGERY  3/2004    ANUS SURGERY  2010    BACK SURGERY      BLADDER REPAIR  [id number]    CARDIAC CATHETERIZATION  07/30/2018    CARDIAC SURGERY      heart cath    CATARACT REMOVAL Right 12/2007    CATARACT REMOVAL Left 1/2008    CORONARY ANGIOPLASTY WITH STENT PLACEMENT  2015    LAD, RCA    COSMETIC SURGERY  5/2008    eyelid    EKG 12-LEAD  4/25/2015         ENDOSCOPY, COLON, DIAGNOSTIC      EYE SURGERY      FINGER SURGERY Left 8/2010    left index    FOOT SURGERY  10/2004    HYSTERECTOMY  1978    JOINT REPLACEMENT      both knees    KNEE ARTHROPLASTY      R 2/98, L 10/98    LUMBAR SPINE SURGERY  3/4/05    REFRACTIVE SURGERY  2012    THYROIDECTOMY  1958    UPPER GASTROINTESTINAL ENDOSCOPY  2015    Dr. Lien Franco; Dietary Nutrition Supplements: Diabetic Oral Supplement  Allergies    Atarax [hydroxyzine]; Biaxin [clarithromycin]; Ceclor [cefaclor]; Oxycontin [oxycodone]; Tape [adhesive tape]; Tylenol with codeine #3 [acetaminophen-codeine]; Ultram [tramadol]; Vioxx [rofecoxib]; Advil [ibuprofen]; Bumex [bumetanide];  Codeine; Darvocet a500 [propoxyphene n-acetaminophen]; and Darvon [fd&c red #40-fd&c yellow #10-propoxyphene]    Meds    Current Medications    lactulose  30 g Oral BID    doxazosin  2 mg Oral Daily    aspirin  81 mg Oral Daily    vitamin D  5,000 Units Oral Daily    clopidogrel  75 mg Oral Daily    famotidine  20 mg Oral Daily    hydrALAZINE  100 mg Oral TID    allopurinol  100 mg Oral Nightly    isosorbide mononitrate  60 mg Oral Daily    levothyroxine  150 mcg Oral Daily    losartan  100 mg Oral Daily    oxybutynin 10 mg Oral Daily    ranolazine  500 mg Oral BID    metoprolol succinate  50 mg Oral BID    pneumococcal 13-valent conjugate  0.5 mL Intramuscular Once    sodium chloride flush  10 mL Intravenous 2 times per day    insulin lispro  0-6 Units Subcutaneous 4x Daily AC & HS    heparin (porcine)  5,000 Units Subcutaneous Q8H     hydrALAZINE, butalbital-acetaminophen-caffeine, ondansetron, glucose, dextrose, glucagon (rDNA), dextrose, sodium chloride flush, magnesium hydroxide, acetaminophen  IV Drips/Infusions   dextrose      lactated ringers 50 mL/hr at 08/30/18 1007     Vitals    Vitals    height is 5' 3\" (1.6 m) and weight is 192 lb 9.6 oz (87.4 kg). Her oral temperature is 97.8 °F (36.6 °C). Her blood pressure is 126/76 and her pulse is 65. Her respiration is 16 and oxygen saturation is 96%. O2 Flow Rate (L/min): 3 L/min  I/O    Intake/Output Summary (Last 24 hours) at 08/30/18 1354  Last data filed at 08/30/18 1051   Gross per 24 hour   Intake           2794.9 ml   Output              275 ml   Net           2519.9 ml     Patient Vitals for the past 96 hrs (Last 3 readings):   Weight   08/30/18 0343 192 lb 9.6 oz (87.4 kg)   08/29/18 0345 191 lb 3.2 oz (86.7 kg)   08/27/18 2300 186 lb 14.4 oz (84.8 kg)     Exam   Constitutional: Patient appears moderately built and moderately nourished. Up in chair on RA in NAD. Head: Normocephalic and atraumatic. [place] Neck: Neck supple. No JVD or tracheal deviation present. Cardiovascular: Regular rate, regular rhythm, S1 and S2 with no murmur. No peripheral edema  Pulmonary/Chest: Respirations even and unlabored. Lungs with diminished air exchange with bibasilar rales. No wheezing. No distress. Abdominal: Soft. Bowel sounds audible. No distension or tenderness to palpation. Neurological: Patient is alert and oriented to person, place, and time. Skin: Skin is warm and dry. No clubbing or cyanosis.       Labs   ABG  Lab Results   Component Value Date    PH 7.36 visually estimated at 55%.   Doppler parameters were consistent with abnormal left ventricular   relaxation (grade 1 diastolic dysfunction).   The left atrium is Moderately dilated.   Atrial Septal defect could not be ruled out.   Mild tricuspid regurgitation visualized.   Mild mitral regurgitation is present.   Aortic valve appears tricuspid. Leaflets exhibited moderately increased   thickness and mildly reduced cuspal separation of the aortic valve. Aortic   valve leaflets are Moderately calcified.   Mild aortic stenosis is present. There was Trivial aortic regurgitation.      Signature      ----------------------------------------------------------------   Electronically signed by Sidney Kauffman MD (Interpreting   physician) on 07/27/2018 at 03:55 PM      Cultures    Procalcitonin  Lab Results   Component Value Date    PROCAL 0.08 08/27/2018    PROCAL 0.07 04/30/2017    PROCAL < 0.05 12/23/2016        Radiology    CXR    8/27: No acute findings       HRCT chest 8/29/18    Impression   1. Parenchymal bandlike opacities are noted within the lingula and left lung base dependently and may represent scarring or atelectasis. No honeycombing or emphysematous changes are seen. No bronchiectasis is demonstrated. 2. 6 mm ground glass pulmonary nodule within the superior aspect of the right lower lobe abutting the fissure. This is indeterminate. In the absence of a known primary malignancy, recommend follow-up CT evaluation in 6 months to document stability. 3. Calcified mediastinal nodes are noted and may be related to granulomatous change.        (See actual reports for details)    Assessment   Dyspnea: multifactorial with RLD and episodic hyperventilation  6 mm RLL lung nodule in low risk patient  Chronic diastolic HF  TURNER on CKD  HTN uncontrolled  PAF   Obesity with BMI 34.2  Physical deconditioning  Full Code  Recommendations   Will need repeat CT chest without contrast in 6 months to follow lung nodule  Home

## 2018-08-30 NOTE — PROGRESS NOTES
Home Exercise Program    Goals:  Patient goals : go home    Short term goals  Time Frame for Short term goals: 3 days  Short term goal 1: Pt to be Mod I for sit <> stand to get up to ambulate  Short term goal 2: Pt to ambulate > 150 ft with RW with Supervision for household distances    Long term goals  Time Frame for Long term goals : not set due to short ELOS            AM-PAC Inpatient Mobility without Stair Climbing Raw Score : 17  AM-PAC Inpatient without Stair Climbing T-Scale Score : 48.47  Mobility Inpatient CMS 0-100% Score: 32.72  Mobility Inpatient without Stair CMS G-Code Modifier : CJ

## 2018-08-30 NOTE — PROGRESS NOTES
Hospitalist Progress Note  [facility] ICU STEPDOWN TELEMETRY 4K       Patient: Easton Vargas  Unit/Bed: 2X-35/288-J  YOB: 1931  MRN: [id number]  Acct: [de-identified]   Admitting Diagnosis: SOB (shortness of breath) [R06.02]  Admit Date:  8/27/2018  Hospital Day: 3    Subjective:    Patient is feeling fine and has no new complaints today. Elevated BP this morning. Serum creatinine trending down 2.3 today from 2.6 yesterday. Presented w/ a 3 days h/o N/V prior to admission. Patient Seen, Chart, Labs, Radiology studies, and Consults reviewed. Objective:   /66   Pulse 72   Temp 97.5 °F (36.4 °C) (Oral)   Resp 16   Ht 5' 3\" (1.6 m)   Wt 192 lb 9.6 oz (87.4 kg)   SpO2 95%   BMI 34.12 kg/m²       Intake/Output Summary (Last 24 hours) at 08/30/18 1827  Last data filed at 08/30/18 1444   Gross per 24 hour   Intake          2740.46 ml   Output              575 ml   Net          2165.46 ml     Review of Systems   Constitutional: Negative for activity change, chills, fatigue, fever and unexpected weight change. HENT: Negative for congestion, drooling, ear pain, hearing loss, mouth sores, postnasal drip, sinus pain, sneezing, tinnitus and voice change. Eyes: Negative for photophobia, redness, itching and visual disturbance. Respiratory: Negative for apnea, cough, choking, chest tightness, shortness of breath, wheezing and stridor. Cardiovascular: Negative for chest pain, palpitations and leg swelling. Gastrointestinal: Negative for abdominal distention, anal bleeding, blood in stool, diarrhea, nausea, rectal pain and vomiting. Endocrine: Negative for heat intolerance, polydipsia, polyphagia and polyuria. Genitourinary: Negative for difficulty urinating, dysuria, flank pain, genital sores, menstrual problem, urgency, vaginal bleeding, vaginal discharge and vaginal pain. Musculoskeletal: Negative for back pain, joint swelling, neck pain and neck stiffness.    Skin: Negative stenosis - Small size vessels. Mild-moderate pulmonary hypertension.     Sylvester Klein MD    RADIOLOGY. VQ SCAN-8/27/2018. 1. Low probability for pulmonary artery embolism. CT A/P-8/27/2018. 1. Stable postsurgical changes of the lumbar spine. 2. Chronic degenerative skeletal spondylosis and atherosclerotic disease without interval change. 3. Colonic diverticulosis without diverticulitis or evidence of inflammatory process. 4. Stable small bilateral renal cysts without obstructive uropathy. 5. Small hiatal hernia. CXR-8/27/2018. No acute findings. ECHO-7/24/2018. Left ventricle size is normal.  Mildly increased left ventricle wall thickness. Mild concentric left ventricular hypertrophy. Systolic function was normal.  Ejection fraction is visually estimated at 55%. Doppler parameters were consistent with abnormal left ventricular relaxation (grade 1 diastolic dysfunction). The left atrium is Moderately dilated. Atrial Septal defect could not be ruled out. Mild tricuspid regurgitation visualized. Mild mitral regurgitation is present. Aortic valve appears tricuspid. Leaflets exhibited moderately increased thickness and mildly reduced cuspal separation of the   aortic valve. Aortic valve leaflets are Moderately calcified. Mild aortic stenosis is present. There was Trivial aortic regurgitation. MRI -[facility] 6/1/2015. 1.Moderate to severe central spinal stenosis at C5-6 with mild cord impingement. 2.Mild central spinal stenosis at C6-7 with mild ventral cord impingement. 3.Multilevel mild to moderate bilateral foraminal stenosis. 4.Degenerative disc disease and spondylosis. ASSESSMENT AND  PLAN. 1.NEUROVASCULAR. PERSISTENT HEADACHE-BRAIN MRI-[facility]. C5-C7 SPINAL STENOSIS-6/1/2015. 2.PULMONARY. ACUTE HYPOXEMIA-O2 98 % ON 2L NC. IMPROVED. ? DAE W/ NOCTURNAL HYPOXEMIA-OVERNIGHT PULSE OXIMETRY. MILD TO MODERATE PULMONARY HTN.      CHRONIC D DIMER

## 2018-08-30 NOTE — PROGRESS NOTES
rest.  ABDOMEN: soft, non tender  NEUROLOGICAL: Patient is alert and oriented to person, place, and time. Recent and remote memory intact. Thought is coherant. SKIN: no rash, No significant bruises on exposed surfaces  MUSCULOSKELETAL: Movement is coordinated. Moves all extremities   EXTREMITIES: Distal lower extremity temp is warm, No lower extremity edema. PSYCHIATRIC: mood and affect appropriate. Medications:   Med reviewed  Scheduled Meds:   lactulose  30 g Oral BID    aspirin  81 mg Oral Daily    vitamin D  5,000 Units Oral Daily    clopidogrel  75 mg Oral Daily    famotidine  20 mg Oral Daily    hydrALAZINE  100 mg Oral TID    allopurinol  100 mg Oral Nightly    isosorbide mononitrate  60 mg Oral Daily    levothyroxine  150 mcg Oral Daily    losartan  100 mg Oral Daily    oxybutynin  10 mg Oral Daily    ranolazine  500 mg Oral BID    metoprolol succinate  50 mg Oral BID    pneumococcal 13-valent conjugate  0.5 mL Intramuscular Once    sodium chloride flush  10 mL Intravenous 2 times per day    insulin lispro  0-6 Units Subcutaneous 4x Daily AC & HS    heparin (porcine)  5,000 Units Subcutaneous Q8H     Continuous Infusions:   dextrose      lactated ringers 100 mL/hr at 08/30/18 0110     Labs:   Labs reviewed  Recent Labs      08/28/18   0533  08/29/18   0346  08/30/18   0427   NA  142  139  140   K  4.5  4.6  4.8   CL  104  103  104   CO2  24  22*  23   BUN  93*  85*  68*   CREATININE  2.4*  2.6*  2.3*   LABGLOM  19*  17*  20*   GLUCOSE  120*  106  128*   MG  2.0   --    --    CALCIUM  9.0  9.0  9.8     Recent Labs      08/27/18   1707  08/28/18   0533   WBC  9.6  7.4   RBC  3.63*  3.14*   HGB  11.7*  10.1*   HCT  35.0*  30.6*   PLT  240  196     High resolution CT chest  Impression:        1. Parenchymal bandlike opacities are noted within the lingula and left lung base dependently and may represent scarring or atelectasis. No honeycombing or emphysematous changes are seen.  No bronchiectasis is demonstrated. 2. 6 mm ground glass pulmonary nodule within the superior aspect of the right lower lobe abutting the fissure. This is indeterminate. In the absence of a known primary malignancy, recommend follow-up CT evaluation in 6 months to document stability. 3. Calcified mediastinal nodes are noted and may be related to granulomatous change. ECHO-7/24/2018. Left ventricle size is normal.  Mildly increased left ventricle wall thickness. Mild concentric left ventricular hypertrophy. Systolic function was normal.  Ejection fraction is visually estimated at 55%. Doppler parameters were consistent with abnormal left ventricular relaxation (grade 1 diastolic dysfunction). The left atrium is Moderately dilated. Atrial Septal defect could not be ruled out. Mild tricuspid regurgitation visualized. Mild mitral regurgitation is present. Aortic valve appears tricuspid. Leaflets exhibited moderately increased thickness and mildly reduced cuspal separation of the   aortic valve. Aortic valve leaflets are Moderately calcified. Mild aortic stenosis is present. There was Trivial aortic regurgitation. ASSESSMENT:  1. Acute Kidney Injury. 2nd to volume contraction, Improving, BUN/Creatinine ratio improving. Diuretic on hold. Decrease IV rate to 50/hr  2. Chronic Kidney Disease Stage III, baseline 2.0  3. Metabolic acidosis resolved  4. Essential Hypertension with nephrosclerosis. Uncontrolled. Ok to continue Losartan. Start Curdura 2 mg daily. 5. Renal artery stenosis  6. Atrial fib   7. Coronary artery disease   8. 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  Chronic Grade I diastolic CHF  10. Constipation, Start Lactulose    BMP in AM  Principal Problem:    Acute respiratory failure with hypoxia (HCC)  Active Problems:    DJD (degenerative joint disease)    DM type 2 (diabetes mellitus, type 2) (HCC)    Coronary artery disease without angina pectoris    Moderate malnutrition (HCC)    Elevated troponin    Chronic diastolic congestive heart failure (HCC)    Class 1 obesity in adult    Acute renal failure superimposed on stage 3 chronic kidney disease ([facility] [address]  Resolved Problems:    * No resolved hospital problems.  *     Discussed with Dr. Fede Griffiths, APRN - CNP 8:57 AM 8/30/2018

## 2018-08-31 VITALS
HEART RATE: 76 BPM | HEIGHT: 63 IN | DIASTOLIC BLOOD PRESSURE: 68 MMHG | TEMPERATURE: 97.4 F | WEIGHT: 197.06 LBS | SYSTOLIC BLOOD PRESSURE: 118 MMHG | OXYGEN SATURATION: 95 % | RESPIRATION RATE: 16 BRPM | BODY MASS INDEX: 34.91 KG/M2

## 2018-08-31 PROBLEM — R91.1 INCIDENTAL LUNG NODULE, > 3MM AND < 8MM: Status: ACTIVE | Noted: 2018-08-31

## 2018-08-31 LAB
ANION GAP SERPL CALCULATED.3IONS-SCNC: 15 MEQ/L (ref 8–16)
BUN BLDV-MCNC: 62 MG/DL (ref 7–22)
CALCIUM SERPL-MCNC: 9.9 MG/DL (ref 8.5–10.5)
CHLORIDE BLD-SCNC: 101 MEQ/L (ref 98–111)
CO2: 21 MEQ/L (ref 23–33)
CREAT SERPL-MCNC: 2.3 MG/DL (ref 0.4–1.2)
GFR SERPL CREATININE-BSD FRML MDRD: 20 ML/MIN/1.73M2
GLUCOSE BLD-MCNC: 137 MG/DL (ref 70–108)
GLUCOSE BLD-MCNC: 142 MG/DL (ref 70–108)
GLUCOSE BLD-MCNC: 181 MG/DL (ref 70–108)
POTASSIUM SERPL-SCNC: 4.8 MEQ/L (ref 3.5–5.2)
SODIUM BLD-SCNC: 137 MEQ/L (ref 135–145)

## 2018-08-31 PROCEDURE — 36415 COLL VENOUS BLD VENIPUNCTURE: CPT

## 2018-08-31 PROCEDURE — 6360000002 HC RX W HCPCS: Performed by: HOSPITALIST

## 2018-08-31 PROCEDURE — 6370000000 HC RX 637 (ALT 250 FOR IP): Performed by: INTERNAL MEDICINE

## 2018-08-31 PROCEDURE — APPSS30 APP SPLIT SHARED TIME 16-30 MINUTES: Performed by: NURSE PRACTITIONER

## 2018-08-31 PROCEDURE — 6370000000 HC RX 637 (ALT 250 FOR IP): Performed by: HOSPITALIST

## 2018-08-31 PROCEDURE — 80048 BASIC METABOLIC PNL TOTAL CA: CPT

## 2018-08-31 PROCEDURE — 97116 GAIT TRAINING THERAPY: CPT

## 2018-08-31 PROCEDURE — 6370000000 HC RX 637 (ALT 250 FOR IP): Performed by: NURSE PRACTITIONER

## 2018-08-31 PROCEDURE — 82948 REAGENT STRIP/BLOOD GLUCOSE: CPT

## 2018-08-31 PROCEDURE — 99232 SBSQ HOSP IP/OBS MODERATE 35: CPT | Performed by: INTERNAL MEDICINE

## 2018-08-31 PROCEDURE — 97110 THERAPEUTIC EXERCISES: CPT

## 2018-08-31 PROCEDURE — 99999 PR OFFICE/OUTPT VISIT,PROCEDURE ONLY: CPT | Performed by: NURSE PRACTITIONER

## 2018-08-31 PROCEDURE — 99239 HOSP IP/OBS DSCHRG MGMT >30: CPT | Performed by: INTERNAL MEDICINE

## 2018-08-31 RX ORDER — FUROSEMIDE 40 MG/1
40 TABLET ORAL DAILY
Qty: 60 TABLET | Refills: 3 | Status: SHIPPED
Start: 2018-08-31 | End: 2019-01-01

## 2018-08-31 RX ORDER — LEVOTHYROXINE SODIUM 112 UG/1
112 TABLET ORAL DAILY
Qty: 30 TABLET | Refills: 3 | Status: SHIPPED | OUTPATIENT
Start: 2018-09-01

## 2018-08-31 RX ADMIN — LEVOTHYROXINE SODIUM 112 MCG: 112 TABLET ORAL at 06:30

## 2018-08-31 RX ADMIN — ASPIRIN 81 MG CHEWABLE TABLET 81 MG: 81 TABLET CHEWABLE at 08:21

## 2018-08-31 RX ADMIN — METOPROLOL SUCCINATE 50 MG: 50 TABLET, FILM COATED, EXTENDED RELEASE ORAL at 05:38

## 2018-08-31 RX ADMIN — VITAMIN D, TAB 1000IU (100/BT) 5000 UNITS: 25 TAB at 08:21

## 2018-08-31 RX ADMIN — HYDRALAZINE HYDROCHLORIDE 100 MG: 50 TABLET ORAL at 08:21

## 2018-08-31 RX ADMIN — LOSARTAN POTASSIUM 100 MG: 100 TABLET, FILM COATED ORAL at 08:21

## 2018-08-31 RX ADMIN — ONDANSETRON 4 MG: 4 TABLET, ORALLY DISINTEGRATING ORAL at 04:22

## 2018-08-31 RX ADMIN — FAMOTIDINE 20 MG: 20 TABLET ORAL at 08:21

## 2018-08-31 RX ADMIN — RANOLAZINE 500 MG: 500 TABLET, FILM COATED, EXTENDED RELEASE ORAL at 08:21

## 2018-08-31 RX ADMIN — DOXAZOSIN 2 MG: 4 TABLET ORAL at 08:25

## 2018-08-31 RX ADMIN — ACETAMINOPHEN 650 MG: 325 TABLET ORAL at 08:21

## 2018-08-31 RX ADMIN — CLOPIDOGREL BISULFATE 75 MG: 75 TABLET ORAL at 08:21

## 2018-08-31 RX ADMIN — HEPARIN SODIUM 5000 UNITS: 5000 INJECTION INTRAVENOUS; SUBCUTANEOUS at 08:28

## 2018-08-31 RX ADMIN — ISOSORBIDE MONONITRATE 60 MG: 60 TABLET ORAL at 08:21

## 2018-08-31 RX ADMIN — Medication 1 UNITS: at 12:55

## 2018-08-31 RX ADMIN — OXYBUTYNIN CHLORIDE 10 MG: 10 TABLET, EXTENDED RELEASE ORAL at 08:21

## 2018-08-31 RX ADMIN — HEPARIN SODIUM 5000 UNITS: 5000 INJECTION INTRAVENOUS; SUBCUTANEOUS at 01:14

## 2018-08-31 ASSESSMENT — PAIN SCALES - GENERAL
PAINLEVEL_OUTOF10: 0
PAINLEVEL_OUTOF10: 3
PAINLEVEL_OUTOF10: 3
PAINLEVEL_OUTOF10: 0
PAINLEVEL_OUTOF10: 0

## 2018-08-31 ASSESSMENT — PAIN DESCRIPTION - PAIN TYPE: TYPE: ACUTE PAIN

## 2018-08-31 ASSESSMENT — PAIN DESCRIPTION - DESCRIPTORS: DESCRIPTORS: HEADACHE

## 2018-08-31 ASSESSMENT — PAIN DESCRIPTION - LOCATION: LOCATION: HEAD

## 2018-08-31 NOTE — PROGRESS NOTES
Renal Progress Note    Patient - Wilbur Felipe   MRN -  [id number]   Acct # - [de-identified]      - 1931    80 y.o. Admit Date: 2018  Hospital Day: 4  Location: --A  Date of evaluation -  2018    Subjective:   CC: shortness of breath   Denies sob or cough. Room air   BP improved 136/-168  Fair appetite  Constipation resolved    Objective:   VITALS:  BP (!) 126/58   Pulse 65   Temp 97.8 °F (36.6 °C) (Oral)   Resp 16   Ht 5' 3\" (1.6 m)   Wt 197 lb 1 oz (89.4 kg)   SpO2 95%   BMI 34.91 kg/m²    Patient Vitals for the past 24 hrs:   BP Temp Temp src Pulse Resp SpO2 Weight   18 0813 (!) 126/58 97.8 °F (36.6 °C) Oral 65 16 95 % -   18 0538 (!) 168/72 - - 69 - - -   18 0415 132/60 97.8 °F (36.6 °C) Oral 73 16 93 % -   18 0409 - - - - - - 197 lb 1 oz (89.4 kg)   18 2345 (!) 147/63 97.9 °F (36.6 °C) Oral 73 17 93 % -   18 1955 (!) 143/61 98.2 °F (36.8 °C) Oral 65 18 93 % -   18 1530 134/66 97.5 °F (36.4 °C) Oral 72 16 95 % -   18 1251 126/76 97.8 °F (36.6 °C) Oral 65 16 96 % -     3 L/min  Patient Vitals for the past 96 hrs (Last 3 readings):   Weight   18 0409 197 lb 1 oz (89.4 kg)   18 0343 192 lb 9.6 oz (87.4 kg)   18 0345 191 lb 3.2 oz (86.7 kg)       Intake/Output Summary (Last 24 hours) at 18 0925  Last data filed at 18 0409   Gross per 24 hour   Intake          2509.06 ml   Output              450 ml   Net          2059.06 ml       EXAM:  CONSTITUTIONAL:  No acute distress. Lying comfortable in bed. Pleasant  HEENT:  Head is normocephalic, Extraocular movement intact. Neck is supple. Voice is clear. CARDIOVASCULAR:  S1, S2  regular rate and rhythm. RESPIRATORY: Faint crackles bases. No wheezes. No shortness of breath noted at rest.  ABDOMEN: soft, non tender  NEUROLOGICAL: Patient is alert and oriented to person, place, and time. Recent and remote memory intact. Thought is coherant.   SKIN: no rash, No moderately increased thickness and mildly reduced cuspal separation of the   aortic valve. Aortic valve leaflets are Moderately calcified. Mild aortic stenosis is present. There was Trivial aortic regurgitation. ASSESSMENT:  1. Acute Kidney Injury. 2nd to volume contraction from nausea and vomiting compounded by diuretic. BUN/Creatinine ratio improving. Stop IV fluids  2. Chronic Kidney Disease Stage III, baseline 2.0  3. Mild Metabolic acidosis  4. Essential Hypertension with nephrosclerosis. Improved controlled. Ok to continue Losartan. Low dose Curdura 2 mg started 8/30.  5. Renal artery stenosis  6. Atrial fib   7. Coronary artery disease   8. 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 Chronic Grade I diastolic CHF  10. Constipation, Resolved  Ok for discharge from renal aspect, Ok to resume Lasix post discharge. Pt to keep FU appt with Dr Ankit Estrada in Oct.   Principal Problem:    Acute respiratory failure with hypoxia ([address]  Active Problems:    DJD (degenerative joint disease)    DM type 2 (diabetes mellitus, type 2) ([address]    Coronary artery disease without angina pectoris    Moderate malnutrition (HCC)    Elevated troponin    Chronic diastolic congestive heart failure (HCC)    Class 1 obesity in adult    Acute renal failure superimposed on stage 3 chronic kidney disease (HCC)    Metabolic acidosis    Lung nodule  Resolved Problems:    * No resolved hospital problems.  LUIZ Rendon CNP 9:25 AM 8/31/2018

## 2018-08-31 NOTE — PLAN OF CARE
[facility] THERAPY MISSED TREATMENT NOTE  [facility] ICU STEPDOWN TELEMETRY 4K  [id number]      Date: 2018  Patient Name: Amanda Santana        CSN: [id number]   : 1931  (80 y.o.)  Gender: female   Referring Practitioner: Dr. Claudene Booty, MD  Diagnosis: SOB         REASON FOR MISSED TREATMENT:  Patient refused treatment. Domonique RN reporting pt had increased dizziness when attempting to complete O2 eval approx 45 min prior to therapist arrival. Pt declining stating she still felt dizzy.
Problem: Falls - Risk of:  Goal: Will remain free from falls  Will remain free from falls   Outcome: Ongoing  No falls this shift. Call light within reach, bed alarm on, frequent rounding. Problem: Risk for Impaired Skin Integrity  Goal: Tissue integrity - skin and mucous membranes  Structural intactness and normal physiological function of skin and  mucous membranes. Outcome: Ongoing  No skin issues besides some bruising. Patient turns self in bed. Will continue to monitor. Problem: Pain Control  Goal: Maintain pain level at or below patient's acceptable level (or 5 if patient is unable to determine acceptable level)  Outcome: Ongoing  Pain Assessment: 0-10  Pain Level: 0   Pain goal:  no pain  Is pain goal met at this time? Yes     Additional interventions to be implemented: none      Problem: Cardiovascular  Goal: No DVT, peripheral vascular complications  Outcome: Ongoing  No signs of DVT. Subq heparin  Goal: Hemodynamic stability  Outcome: Ongoing  Blood pressure was high but has improved. Will continue to monitor    Problem: Respiratory  Goal: No pulmonary complications  Outcome: Ongoing  Was on 2L when first brought up to floor. Weaned patient off O2. Continuously monitoring. Goal: O2 Sat > 90%  Outcome: Ongoing  95% on room air    Problem: Skin Integrity/Risk  Goal: No skin breakdown during hospitalization  Outcome: Completed Date Met: 08/28/18      Comments: Care plan reviewed with patient. Patient verbalize understanding of the plan of care and contribute to goal setting.
Problem: Falls - Risk of:  Goal: Will remain free from falls  Will remain free from falls   Outcome: Ongoing  No falls this shift. Up with contact guard to bathroom and chair. Walker and gait belt being utilized. Bed/chair alarm in place. Problem: Risk for Impaired Skin Integrity  Goal: Tissue integrity - skin and mucous membranes  Structural intactness and normal physiological function of skin and  mucous membranes. Outcome: Ongoing  No new skin issues noted. Patient turns self frequently. Problem: Pain Control  Goal: Maintain pain level at or below patient's acceptable level (or 5 if patient is unable to determine acceptable level)  Outcome: Ongoing  Pain Assessment: 0-10  Pain Level: 0   Pain goal:  no pain  Is pain goal met at this time? Yes     Additional interventions to be implemented: medications tylenol and position change      Problem: Cardiovascular  Goal: No DVT, peripheral vascular complications  Outcome: Ongoing  SCD's in place  Goal: Hemodynamic stability  Outcome: Ongoing  Vitals stable. Problem: Respiratory  Goal: No pulmonary complications  Outcome: Ongoing  Patient on room air during day. Oxygen saturation drops in 70's while sleeping. Placed on 3L while sleeping and with naps. Problem: Nutrition  Goal: Optimal nutrition therapy  Outcome: Ongoing  Tolerating diet well. Comments: Care plan reviewed with patient. Patient verbalizes understanding of the plan of care and contribute to goal setting.
Problem: Nutrition  Goal: Optimal nutrition therapy  Outcome: Ongoing  Nutrition Problem: Moderate malnutrition  Intervention: Food and/or Nutrient Delivery: Continue current diet, Start ONS  Nutritional Goals: Pt. will consume 75% or more of meals during LOS.
further needs. Problem: Nutrition  Goal: Optimal nutrition therapy  Outcome: Ongoing  Patient eating three meals a day. Appetite intact. Patient tolerating PO fluids. Comments: Care plan reviewed with patient. Patient verbalizes understanding of the plan of care and contributes to goal setting.

## 2018-08-31 NOTE — PROGRESS NOTES
Oral Daily    losartan  100 mg Oral Daily    oxybutynin  10 mg Oral Daily    ranolazine  500 mg Oral BID    metoprolol succinate  50 mg Oral BID    pneumococcal 13-valent conjugate  0.5 mL Intramuscular Once    sodium chloride flush  10 mL Intravenous 2 times per day    insulin lispro  0-6 Units Subcutaneous 4x Daily AC & HS    heparin (porcine)  5,000 Units Subcutaneous Q8H     hydrALAZINE, butalbital-acetaminophen-caffeine, ondansetron, glucose, dextrose, glucagon (rDNA), dextrose, sodium chloride flush, magnesium hydroxide, acetaminophen  IV Drips/Infusions   dextrose       Vitals    Vitals    height is 5' 3\" (1.6 m) and weight is 197 lb 1 oz (89.4 kg). Her oral temperature is 97.4 °F (36.3 °C). Her blood pressure is 118/68 and her pulse is 76. Her respiration is 16 and oxygen saturation is 95%. O2 Flow Rate (L/min): 3 L/min  I/O    Intake/Output Summary (Last 24 hours) at 08/31/18 1535  Last data filed at 08/31/18 1449   Gross per 24 hour   Intake          1680.14 ml   Output              150 ml   Net          1530.14 ml     Patient Vitals for the past 96 hrs (Last 3 readings):   Weight   08/31/18 0409 197 lb 1 oz (89.4 kg)   08/30/18 0343 192 lb 9.6 oz (87.4 kg)   08/29/18 0345 191 lb 3.2 oz (86.7 kg)     Exam   Constitutional: Patient appears moderately built and moderately nourished. Up in chair on RA in NAD. Head: Normocephalic and atraumatic. Jeremias McDonough Neck: Neck supple. No JVD or tracheal deviation present. Cardiovascular: Regular rate, regular rhythm, S1 and S2 with no murmur. No peripheral edema  Pulmonary/Chest: Respirations even and unlabored. Lungs with diminished air exchange with bibasilar rales. No wheezing. No distress. Abdominal: Soft. Bowel sounds audible. No distension or tenderness to palpation. Neurological: Patient is alert and oriented to person, place, and time. Skin: Skin is warm and dry. No clubbing or cyanosis.       Labs   ABG  Lab Results   Component Value Date    PH 7.36 08/27/2018    PO2 84 08/27/2018    PCO2 42 08/27/2018    HCO3 24 08/27/2018    O2SAT 96 08/27/2018     Lab Results   Component Value Date    IFIO2 4 08/27/2018     CBC  No results for input(s): WBC, RBC, HGB, HCT, MCV, MCH, MCHC, RDW, PLT, MPV in the last 72 hours. BMP  Recent Labs      08/29/18   0346  08/30/18   0427  08/31/18   0420   NA  139  140  137   K  4.6  4.8  4.8   CL  103  104  101   CO2  22*  23  21*   BUN  85*  68*  62*   CREATININE  2.6*  2.3*  2.3*   GLUCOSE  106  128*  137*   CALCIUM  9.0  9.8  9.9     LFT  No results for input(s): AST, ALT, ALB, BILITOT, ALKPHOS, LIPASE in the last 72 hours. Invalid input(s): AMYLASE  TROP  Lab Results   Component Value Date    TROPONINT 0.045 08/28/2018    TROPONINT 0.041 08/28/2018    TROPONINT 0.037 08/28/2018     BNP  Lab Results   Component Value Date    PROBNP 757.9 08/27/2018    PROBNP 2194.0 04/30/2017    PROBNP 1358.0 01/19/2017     D-Dimer  Lab Results   Component Value Date    DDIMER 1067.00 08/28/2018     Lactic Acid  No results for input(s): LACTA in the last 72 hours. INR  No results for input(s): INR, PROTIME in the last 72 hours. PTT  No results for input(s): APTT in the last 72 hours. Glucose  Recent Labs      08/30/18 2028  08/31/18   0642  08/31/18   1136   POCGLU  148*  142*  181*     UA   No results for input(s): SPECGRAV, PHUR, COLORU, CLARITYU, MUCUS, PROTEINU, BLOODU, RBCUA, WBCUA, BACTERIA, NITRU, GLUCOSEU, BILIRUBINUR, UROBILINOGEN, KETUA, LABCAST, LABCASTTY, AMORPHOS in the last 72 hours. Invalid input(s): CRYSTALS.   PFTs                 Echo      ECHO:     Conclusions      Summary   Left ventricle size is normal.   Mildly increased left ventricle wall thickness.   Mild concentric left ventricular hypertrophy.   Systolic function was normal.   Ejection fraction is visually estimated at 55%.   Doppler parameters were consistent with abnormal left ventricular   relaxation (grade 1 diastolic dysfunction).   The left atrium

## 2018-08-31 NOTE — PROGRESS NOTES
BID    pneumococcal 13-valent conjugate  0.5 mL Intramuscular Once    sodium chloride flush  10 mL Intravenous 2 times per day    insulin lispro  0-6 Units Subcutaneous 4x Daily AC & HS    heparin (porcine)  5,000 Units Subcutaneous Q8H     hydrALAZINE, butalbital-acetaminophen-caffeine, ondansetron, glucose, dextrose, glucagon (rDNA), dextrose, sodium chloride flush, magnesium hydroxide, acetaminophen  IV Drips/Infusions   dextrose       Vitals    Vitals    height is 5' 3\" (1.6 m) and weight is 197 lb 1 oz (89.4 kg). Her oral temperature is 97.4 °F (36.3 °C). Her blood pressure is 104/55 (abnormal) and her pulse is 76. Her respiration is 16 and oxygen saturation is 96%. O2 Flow Rate (L/min): 3 L/min  I/O    Intake/Output Summary (Last 24 hours) at 08/31/18 1331  Last data filed at 08/31/18 1137   Gross per 24 hour   Intake          2509.06 ml   Output              450 ml   Net          2059.06 ml     Patient Vitals for the past 96 hrs (Last 3 readings):   Weight   08/31/18 0409 197 lb 1 oz (89.4 kg)   08/30/18 0343 192 lb 9.6 oz (87.4 kg)   08/29/18 0345 191 lb 3.2 oz (86.7 kg)     Exam   Constitutional: Patient appears moderately built and moderately nourished. Up in chair on RA in NAD. Head: Normocephalic and atraumatic. Marisa Reek Neck: Neck supple. No JVD or tracheal deviation present. Cardiovascular: Regular rate, regular rhythm, S1 and S2 with no murmur. No peripheral edema  Pulmonary/Chest: Respirations even and unlabored. Lungs with diminished air exchange. No rales. No wheezing. No distress. Abdominal: Soft. Bowel sounds audible. No distension or tenderness to palpation. +bM. Neurological: Patient is alert and oriented to person, place, and time. No focal changes. Skin: Skin is warm and dry. No clubbing or cyanosis.       Labs   ABG  Lab Results   Component Value Date    PH 7.36 08/27/2018    PO2 84 08/27/2018    PCO2 42 08/27/2018    HCO3 24 08/27/2018    O2SAT 96 08/27/2018     Lab Results mitral regurgitation is present.   Aortic valve appears tricuspid. Leaflets exhibited moderately increased   thickness and mildly reduced cuspal separation of the aortic valve. Aortic   valve leaflets are Moderately calcified.   Mild aortic stenosis is present. There was Trivial aortic regurgitation.      Signature      ----------------------------------------------------------------   Electronically signed by Alejandro Peralta MD (Interpreting   physician) on 07/27/2018 at 03:55 PM      Cultures    Procalcitonin  Lab Results   Component Value Date    PROCAL 0.08 08/27/2018    PROCAL 0.07 04/30/2017    PROCAL < 0.05 12/23/2016      BC x 2 (-) to date  UA (-)  Radiology    CXR    8/27: No acute findings       HRCT chest 8/29/18    Impression   1. Parenchymal bandlike opacities are noted within the lingula and left lung base dependently and may represent scarring or atelectasis. No honeycombing or emphysematous changes are seen. No bronchiectasis is demonstrated. 2. 6 mm ground glass pulmonary nodule within the superior aspect of the right lower lobe abutting the fissure. This is indeterminate. In the absence of a known primary malignancy, recommend follow-up CT evaluation in 6 months to document stability. 3. Calcified mediastinal nodes are noted and may be related to granulomatous change. (See actual reports for details)    Assessment   Dyspnea: multifactorial with RLD and episodic hyperventilation  6 mm RLL lung nodule in low risk patient  Chronic diastolic HF  TURNER on CKD: Nephrology following  HTN improved  PAF   Obesity with BMI 34.2  Physical deconditioning  Full Code  Recommendations   OK for discharge from pulmonary standpoint  Will need repeat CT chest without contrast in 6 months to follow lung nodule  Home oxygen eval before DC  Pulmonary hygiene with IS  PT/OT  DVT prophylaxis with SC Heparin      Case discussed with nurse and patient. Questions and concerns addressed.   Meds and Orders reviewed.     Electronically signed by     Denys Saint, APRN - CNP on 8/31/2018 at 1:31 PM

## 2018-08-31 NOTE — PROGRESS NOTES
Attempted to complete home oxygen evaluation pt at rest on room air pulse ox 96%. Pt stood up with walker at chair became lightheaded and wobbly and immediately sat down. RN notified attempted once more with RN at bedside pt remains unsteady. RN states to attempt evaluation again later this evening.

## 2018-08-31 NOTE — PROGRESS NOTES
goal 2: Pt to ambulate > 150 ft with RW with Supervision for household distances    Long term goals  Time Frame for Long term goals : not set due to short ELOS            AM-PAC Inpatient Mobility without Stair Climbing Raw Score : 17  AM-PAC Inpatient without Stair Climbing T-Scale Score : 48.47  Mobility Inpatient CMS 0-100% Score: 32.72  Mobility Inpatient without Stair CMS G-Code Modifier : CJ

## 2018-08-31 NOTE — DISCHARGE SUMMARY
Results   Component Value Date     CHOL 202 (H) 08/28/2018     HDL 33 08/28/2018     TRIG 160 08/28/2018      LIVER PROFILE:       Recent Labs      08/28/18   0533   AST  11   ALT  <5*   BILITOT  0.7   ALKPHOS  56      ABGs:         Lab Results   Component Value Date     PH 7.36 08/27/2018     PCO2 42 08/27/2018     PO2 84 08/27/2018     HCO3 24 08/27/2018     O2SAT 96 08/27/2018      Results for Sara Guevara (MRN [id number]) as of 8/29/2018 07:45    Ref. Range 8/28/2018 18:56   D-Dimer, Quant Latest Ref Range: 0.00 - 500.0 ng/ml FEU 1067.00 (H)      MICROBIOLOGY & HISTOPATHOLOGY. Results for Sara Guevara (MRN [id number]) as of 8/28/2018 13:12    Ref. Range 8/28/2018 00:07   Color, UA Latest Ref Range: STRAW-YELL  YELLOW   Glucose, UA Latest Ref Range: NEGATIVE mg/dl NEGATIVE   Bilirubin, Urine Latest Ref Range: NEGATIVE  NEGATIVE   Ketones, Urine Latest Ref Range: NEGATIVE  NEGATIVE   Blood, Urine Latest Ref Range: NEGATIVE  NEGATIVE   pH, UA Latest Ref Range: 5.0 - 9.0  5.0   Protein, UA Latest Ref Range: NEGATIVE  30 (A)   Urobilinogen, Urine Latest Ref Range: 0.0 - 1.0 eu/dl 0.2   Nitrite, Urine Latest Ref Range: NEGATIVE  NEGATIVE   Leukocyte Esterase, Urine Latest Ref Range: NEGATIVE  NEGATIVE   Casts UA Latest Ref Range: NONE SEEN /lpf NONE SEEN   CASTS 2 Latest Ref Range: NONE SEEN /lpf NONE SEEN   WBC, UA Latest Ref Range: 0-4/hpf /hpf 0-2   RBC, UA Latest Ref Range: 0-2/hpf /hpf NONE SEEN   Epi Cells Latest Ref Range: 3-5/hpf /hpf 0-2   Renal Epithelial, Urine Latest Ref Range: NONE SEEN  NONE SEEN   Bacteria, UA Latest Ref Range: FEW/NONE S /hpf NONE   Yeast, Urine Latest Ref Range: NONE SEEN  NONE SEEN   Crystals Latest Ref Range: NONE SEEN  NONE SEEN   Character, Urine Latest Ref Range: CLEAR-SL C  CLEAR   Specific Gravity, Urine Latest Ref Range: 1.002 - 1.03  1.016      TOXICOLOGY. None.     ENDOSCOPE STUDIES. None.     PROCEDURES. CARDIAC CATH-7/18/2018.   Procedure Summary  Mild ds in LAD/RCA/Lcx. Diffuse calcification in coronary arteries. LAD and RCA stents are patent. Ostial Diag 1 and ostial Diag 2 have 80% stenosis - Small size vessels. Mild-moderate pulmonary hypertension.     Bernadette Dang MD     RADIOLOGY. HRCT CT CHEST-8/29/2018. 1. Parenchymal bandlike opacities are noted within the lingula and left lung base dependently         and may represent scarring or atelectasis. No honeycombing or emphysematous changes are seen. No bronchiectasis is demonstrated. 2. 6 mm ground glass pulmonary nodule within the superior aspect of the right lower lobe         abutting the fissure. This is indeterminate. In the absence of a known primary malignancy,         recommend follow-up CT evaluation in 6 months to document stability. 3.Calcified mediastinal nodes are noted and may be related to granulomatous change. VQ SCAN-8/27/2018. 1. Low probability for pulmonary artery embolism.     CT A/P-8/27/2018. 1. Stable postsurgical changes of the lumbar spine. 2. Chronic degenerative skeletal spondylosis and atherosclerotic disease without interval change. 3. Colonic diverticulosis without diverticulitis or evidence of inflammatory process. 4. Stable small bilateral renal cysts without obstructive uropathy. 5. Small hiatal hernia.     CXR-8/27/2018. No acute findings.     ECHO-7/24/2018. Left ventricle size is normal.  Mildly increased left ventricle wall thickness. Mild concentric left ventricular hypertrophy. Systolic function was normal.  Ejection fraction is visually estimated at 55%. Doppler parameters were consistent with abnormal left ventricular relaxation (grade 1 diastolic dysfunction). The left atrium is Moderately dilated. Atrial Septal defect could not be ruled out. Mild tricuspid regurgitation visualized. Mild mitral regurgitation is present. Aortic valve appears tricuspid.    Leaflets exhibited moderately increased thickness and mildly mouth daily             famotidine (PEPCID) 20 MG tablet  Take 20 mg by mouth daily as needed             furosemide (LASIX) 40 MG tablet  Take 1 tablet by mouth daily             glipiZIDE (GLUCOTROL) 5 MG tablet  Take 5 mg by mouth daily             hydrALAZINE (APRESOLINE) 100 MG tablet  Take 1 tablet by mouth 3 times daily             isosorbide mononitrate (IMDUR) 120 MG extended release tablet  Take 60 mg by mouth daily              levothyroxine (SYNTHROID) 112 MCG tablet  Take 1 tablet by mouth Daily             losartan (COZAAR) 100 MG tablet  Take 1 tablet by mouth daily             LUTEIN-ZEAXANTHIN PO  Take 20 mg by mouth nightly              metoprolol succinate (TOPROL XL) 50 MG extended release tablet  Take 50 mg by mouth daily             oxybutynin (DITROPAN) 5 MG tablet  Take 10 mg by mouth daily              ranolazine (RANEXA) 500 MG extended release tablet  Take 1 tablet by mouth 2 times daily                 Time Spent on discharge is more than 30 minutes in the examination, evaluation, counseling and review of medications and discharge plan. Signed: Thank you Shana Carbajal MD for the opportunity to be involved in this patient's care.     Electronically signed by Nidia Florentino MD on 8/31/2018 at 5:59 PM

## 2018-09-01 ENCOUNTER — CARE COORDINATION (OUTPATIENT)
Dept: CASE MANAGEMENT | Age: 83
End: 2018-09-01

## 2018-09-01 LAB
ANA SCREEN: NORMAL
CYCLIC CITRULLIN PEPTIDE AB: 8 UNITS (ref 0–19)

## 2018-09-01 NOTE — CARE COORDINATION
[facility] Transitions Initial Follow Up Call    Call within 2 business days of discharge: Yes    Patient: Amanda Santana Patient : 1931   MRN: [id number]  Reason for Admission: There are no discharge diagnoses documented for the most recent discharge. Discharge Date: 18 RARS: Readmission Risk Score: 18     Spoke with: [address]: [facility]    Per patient she is home and feeling \"the same\". She denies vomiting, she is a little nauseas, she is drinking 7up and keeping it down. She does not have an appetite yet. She stated that her daughter will be home soon to check on her. Patient denies fever or chills, chest pain, or severe SOB. She stated that she has her medications and is taking them, declined med review. Patient stated that she is tired and resting. She stated that she has transportation to appt's from her daughter. She denies any issues, needs, or concerns.      Non-face-to-face services provided:  Obtained and reviewed discharge summary and/or continuity of care documents    Care Transitions 24 Hour Call    Do you have any ongoing symptoms?:  No  Do you have a copy of your discharge instructions?:  Yes  Do you have all of your prescriptions and are they filled?:  Yes  Have you been contacted by a [address]?:  No  Have you scheduled your follow up appointment?:  Yes  How are you going to get to your appointment?:  Car - family or friend to transport  Were you discharged with any Home Care or Post Acute Services:  No  Do you feel like you have everything you need to keep you well at home?:  Yes  Care Transitions Interventions  No Identified Needs         Follow Up  Future Appointments  Date Time Provider Ranjith [place]   10/11/2018 3:40 PM MD FRANCES IsaacA KIDNEY [facility] [place]   10/30/2018 9:30 AM Alexander Hudson MD [facility] [place]   2018 3:45 PM DO IMELDA Santiago Rheum [facility] [place]   2018 2:30 PM STR EXAM ROOM 10 [facility] OP NURS None

## 2018-09-02 LAB
BLOOD CULTURE, ROUTINE: NORMAL
BLOOD CULTURE, ROUTINE: NORMAL

## 2018-09-04 ENCOUNTER — NURSE TRIAGE (OUTPATIENT)
Dept: ADMINISTRATIVE | Age: 83
End: 2018-09-04

## 2018-09-04 ENCOUNTER — TELEPHONE (OUTPATIENT)
Dept: CARDIOLOGY CLINIC | Age: 83
End: 2018-09-04

## 2018-09-04 ENCOUNTER — TELEPHONE (OUTPATIENT)
Dept: RHEUMATOLOGY | Age: 83
End: 2018-09-04

## 2018-09-04 DIAGNOSIS — M1A.09X0 IDIOPATHIC CHRONIC GOUT OF MULTIPLE SITES WITHOUT TOPHUS: Primary | ICD-10-CM

## 2018-09-04 NOTE — TELEPHONE ENCOUNTER
Patient is needing to have a 1-2 week hospital f/u and she is requesting for it to be after 2:30 p.m. Please advise patient with an appt.

## 2018-09-04 NOTE — TELEPHONE ENCOUNTER
Patient calling in stating that she was taking 300mg allopurinol and 100mg allopurinol. She recently had a hospital encounter and they took her off the 300mg allopurinol. She wanted to make you aware of this.

## 2018-09-10 ENCOUNTER — OFFICE VISIT (OUTPATIENT)
Dept: CARDIOLOGY CLINIC | Age: 83
End: 2018-09-10
Payer: MEDICARE

## 2018-09-10 VITALS
WEIGHT: 189 LBS | SYSTOLIC BLOOD PRESSURE: 130 MMHG | DIASTOLIC BLOOD PRESSURE: 68 MMHG | HEIGHT: 63 IN | BODY MASS INDEX: 33.49 KG/M2 | HEART RATE: 72 BPM

## 2018-09-10 DIAGNOSIS — N18.4 CKD (CHRONIC KIDNEY DISEASE), STAGE IV (HCC): ICD-10-CM

## 2018-09-10 DIAGNOSIS — I50.32 CHRONIC DIASTOLIC CONGESTIVE HEART FAILURE (HCC): ICD-10-CM

## 2018-09-10 DIAGNOSIS — I10 ESSENTIAL HYPERTENSION: ICD-10-CM

## 2018-09-10 DIAGNOSIS — Z95.5 PRESENCE OF STENT IN LEFT CIRCUMFLEX CORONARY ARTERY: ICD-10-CM

## 2018-09-10 DIAGNOSIS — E78.5 HYPERLIPIDEMIA, UNSPECIFIED HYPERLIPIDEMIA TYPE: ICD-10-CM

## 2018-09-10 DIAGNOSIS — Z95.5 PRESENCE OF STENT IN RIGHT CORONARY ARTERY: ICD-10-CM

## 2018-09-10 DIAGNOSIS — E55.9 VITAMIN D DEFICIENCY: ICD-10-CM

## 2018-09-10 DIAGNOSIS — I25.10 CORONARY ARTERY DISEASE INVOLVING NATIVE CORONARY ARTERY OF NATIVE HEART WITHOUT ANGINA PECTORIS: Primary | ICD-10-CM

## 2018-09-10 DIAGNOSIS — Z95.820 S/P ANGIOPLASTY WITH STENT: ICD-10-CM

## 2018-09-10 PROCEDURE — 1111F DSCHRG MED/CURRENT MED MERGE: CPT | Performed by: INTERNAL MEDICINE

## 2018-09-10 PROCEDURE — G8417 CALC BMI ABV UP PARAM F/U: HCPCS | Performed by: INTERNAL MEDICINE

## 2018-09-10 PROCEDURE — 1090F PRES/ABSN URINE INCON ASSESS: CPT | Performed by: INTERNAL MEDICINE

## 2018-09-10 PROCEDURE — 4040F PNEUMOC VAC/ADMIN/RCVD: CPT | Performed by: INTERNAL MEDICINE

## 2018-09-10 PROCEDURE — 1101F PT FALLS ASSESS-DOCD LE1/YR: CPT | Performed by: INTERNAL MEDICINE

## 2018-09-10 PROCEDURE — G8598 ASA/ANTIPLAT THER USED: HCPCS | Performed by: INTERNAL MEDICINE

## 2018-09-10 PROCEDURE — 1036F TOBACCO NON-USER: CPT | Performed by: INTERNAL MEDICINE

## 2018-09-10 PROCEDURE — 99214 OFFICE O/P EST MOD 30 MIN: CPT | Performed by: INTERNAL MEDICINE

## 2018-09-10 PROCEDURE — 1123F ACP DISCUSS/DSCN MKR DOCD: CPT | Performed by: INTERNAL MEDICINE

## 2018-09-10 PROCEDURE — G8427 DOCREV CUR MEDS BY ELIG CLIN: HCPCS | Performed by: INTERNAL MEDICINE

## 2018-09-10 RX ORDER — ATORVASTATIN CALCIUM 20 MG/1
20 TABLET, FILM COATED ORAL DAILY
Qty: 30 TABLET | Refills: 5 | Status: SHIPPED | OUTPATIENT
Start: 2018-09-10

## 2018-09-10 NOTE — PROGRESS NOTES
Chief Complaint   Patient presents with   [address] from [facility] Coronary Artery Disease   Patient here for a hospital follow up  [address]:  Elevated troponin in an 71-year-old female  patient presented with cough, nausea, vomiting and admitted for dehydration  and nausea and vomiting.     Patient with past medical history of CAD, PCI to LAD, PCI to RCA, HTN, Dyslipidemia     Denied cp, dizziness or edema     Sob on exertion chronic    Occasional palpitations    recordes reviewed      Patient Active Problem List   Diagnosis    Prerenal acute renal failure ([address]    Essential hypertension    Atrial fib/flutter, transient    DJD (degenerative joint disease)    Renal artery stenosis ([address]    HLD (hyperlipidemia)    DM type 2 (diabetes mellitus, type 2) ([facility])    Anemia    CKD (chronic kidney disease) stage 3, GFR 30-59 ml/min    Coronary artery disease without angina pectoris    Headache    Abnormal EKG    Cardiovascular risk factor    Angina, class II ([address]    DM (diabetes mellitus) [facility]    Dyslipidemia    S/P angioplasty with stent    Presence of stent in left circumflex coronary artery    Presence of stent in right coronary artery    Type 2 diabetes mellitus with complication (HCC)    Renovascular hypertension    Hypertensive crisis    TURNER (acute kidney injury) ([address]    DVT (deep venous thrombosis) ([facility])    Urge incontinence of urine    Hypertensive emergency    Hypertensive heart disease with heart failure (HCC)    Acute on chronic congestive heart failure (HCC)    CKD (chronic kidney disease)    Coronary artery disease involving native coronary artery of native heart without angina pectoris    Acute diastolic congestive heart failure (HCC)    Accelerated hypertension    Pneumonia of both lungs due to infectious organism    Wheezing    Interstitial nephritis, acute    Acidosis    Anemia of chronic kidney failure    Pneumonia of both lower lobes due to infectious organism ([address]    Hypervolemia    Constipation by delayed colonic transit    Metabolic alkalosis    Severe dehydration    Diabetes mellitus with hyperglycemia (HCC)    TURNER (acute kidney injury) ([address]    Anemia of chronic disease    CKD (chronic kidney disease) stage 4, GFR 15-29 ml/min (HCC)    Osteopenia    Vitamin D deficiency    Anemia of chronic disease    Volume overload    Left atrial dilation    SOB (shortness of breath)    Moderate malnutrition (HCC)    Elevated troponin    Chronic diastolic congestive heart failure (HCC)    Class 1 obesity in adult    Acute respiratory failure with hypoxia (HCC)    Acute renal failure superimposed on stage 3 chronic kidney disease (HCC)    Metabolic acidosis    Lung nodule    Incidental lung nodule, > 3mm and < 8mm       Past Surgical History:   Procedure Laterality Date    ACHILLES TENDON SURGERY  3/2004    ANUS SURGERY  2010    BACK SURGERY      BLADDER REPAIR  [id number]    CARDIAC CATHETERIZATION  07/30/2018    CARDIAC SURGERY      heart cath    CATARACT REMOVAL Right 12/2007    CATARACT REMOVAL Left 1/2008    CORONARY ANGIOPLASTY WITH STENT PLACEMENT  2015    LAD, RCA    COSMETIC SURGERY  5/2008    eyelid    EKG 12-LEAD  4/25/2015         ENDOSCOPY, COLON, DIAGNOSTIC      EYE SURGERY      FINGER SURGERY Left 8/2010    left index    FOOT SURGERY  10/2004    HYSTERECTOMY  1978    JOINT REPLACEMENT      both knees    KNEE ARTHROPLASTY      R 2/98, L 10/98    LUMBAR SPINE SURGERY  3/4/05    REFRACTIVE SURGERY  2012    THYROIDECTOMY  1958    UPPER GASTROINTESTINAL ENDOSCOPY  2015    Dr. Navneet Hilario       Allergies   Allergen Reactions    Atarax [Hydroxyzine] Other (See Comments)     Unsure of reaction     Biaxin [Clarithromycin]     Ceclor [Cefaclor] Other (See Comments)     Looney for 2 days     Oxycontin [Oxycodone] Other (See Comments)     dizziness    Tape [Adhesive Tape]      Tears skin    Tylenol With focal findings or movement disorder noted  Musculoskeletal - no joint tenderness, deformity or swelling  Extremities - peripheral pulses normal, no pedal edema, no clubbing or cyanosis  Skin - normal coloration and turgor, no rashes, no suspicious skin lesions noted    Lab  No results for input(s): CKTOTAL, CKMB, CKMBINDEX, TROPONINI in the last 72 hours. CBC:   Lab Results   Component Value Date    WBC 7.4 08/28/2018    RBC 3.14 08/28/2018    RBC 3.48 01/13/2016    HGB 10.1 08/28/2018    HCT 30.6 08/28/2018    MCV 97.5 08/28/2018    MCH 32.2 08/28/2018    MCHC 33.0 08/28/2018    RDW 14.4 02/27/2018     08/28/2018    MPV 11.7 08/28/2018     BMP:    Lab Results   Component Value Date     08/31/2018    K 4.8 08/31/2018    K 4.5 08/28/2018     08/31/2018    CO2 21 08/31/2018    BUN 62 08/31/2018    LABALBU 3.5 08/28/2018    CREATININE 2.3 08/31/2018    CALCIUM 9.9 08/31/2018    LABGLOM 20 08/31/2018    GLUCOSE 137 08/31/2018    GLUCOSE 141 06/20/2018     Hepatic Function Panel:    Lab Results   Component Value Date    ALKPHOS 56 08/28/2018    ALT <5 08/28/2018    AST 11 08/28/2018    PROT 6.3 08/28/2018    BILITOT 0.7 08/28/2018    BILIDIR <0.2 08/27/2018    LABALBU 3.5 08/28/2018     Magnesium:    Lab Results   Component Value Date    MG 2.0 08/28/2018     Warfarin PT/INR:  No components found for: PTPATWAR, PTINRWAR  HgBA1c:    Lab Results   Component Value Date    LABA1C 6.2 08/28/2018     FLP:    Lab Results   Component Value Date    TRIG 160 08/28/2018    HDL 33 08/28/2018    LDLCALC 137 08/28/2018    LABVLDL 35 01/13/2016     TSH:    Lab Results   Component Value Date    TSH 0.154 08/29/2018     Cath 07/18  Procedure Summary  I used 20 cc contrast.  Creat was 2.3  Mild ds in LAD/RCA/Lcx. Diffuse calcification in coronary arteries. LAD and RCA stents are patent. Ostial Diag 1 and ostial Diag 2 have 80% stenosis - Small size vessels.   Mild-moderate pulmonary hypertension      Assessment

## 2018-09-12 ENCOUNTER — TELEPHONE (OUTPATIENT)
Dept: RHEUMATOLOGY | Age: 83
End: 2018-09-12

## 2018-09-12 DIAGNOSIS — R79.89 CREATININE ELEVATION: Primary | ICD-10-CM

## 2018-09-12 LAB
ALBUMIN SERPL-MCNC: 4 G/DL (ref 3.5–5.2)
ALK PHOSPHATASE: 61 U/L (ref 30–146)
ALT SERPL-CCNC: 7 U/L (ref 5–40)
ANION GAP SERPL CALCULATED.3IONS-SCNC: 15 MEQ/L (ref 10–19)
ANION GAP SERPL CALCULATED.3IONS-SCNC: 17 MEQ/L (ref 10–19)
AST SERPL-CCNC: 14 U/L (ref 9–40)
BILIRUB SERPL-MCNC: 0.6 MG/DL
BUN BLDV-MCNC: 75 MG/DL (ref 8–23)
BUN BLDV-MCNC: 76 MG/DL (ref 8–23)
CALCIUM SERPL-MCNC: 9.7 MG/DL (ref 8.5–10.5)
CALCIUM SERPL-MCNC: 9.8 MG/DL (ref 8.5–10.5)
CHLORIDE BLD-SCNC: 98 MEQ/L (ref 95–107)
CHLORIDE BLD-SCNC: 98 MEQ/L (ref 95–107)
CO2: 27 MEQ/L (ref 19–31)
CO2: 28 MEQ/L (ref 19–31)
CREAT SERPL-MCNC: 2.6 MG/DL (ref 0.6–1.3)
CREAT SERPL-MCNC: 2.7 MG/DL (ref 0.6–1.3)
EGFR AFRICAN AMERICAN: 17.7 ML/MIN/1.73 M2
EGFR AFRICAN AMERICAN: 18.5 ML/MIN/1.73 M2
EGFR IF NONAFRICAN AMERICAN: 15.2 ML/MIN/1.73 M2
EGFR IF NONAFRICAN AMERICAN: 15.9 ML/MIN/1.73 M2
GLUCOSE: 135 MG/DL (ref 70–99)
GLUCOSE: 135 MG/DL (ref 70–99)
POTASSIUM SERPL-SCNC: 4.4 MEQ/L (ref 3.5–5.4)
POTASSIUM SERPL-SCNC: 4.6 MEQ/L (ref 3.5–5.4)
SODIUM BLD-SCNC: 141 MEQ/L (ref 135–146)
SODIUM BLD-SCNC: 142 MEQ/L (ref 135–146)
TOTAL PROTEIN: 6.6 G/DL (ref 6.1–8.3)
URIC ACID: 3.9 MG/DL (ref 2.7–6.1)

## 2018-09-13 ENCOUNTER — OFFICE VISIT (OUTPATIENT)
Dept: NEPHROLOGY | Age: 83
End: 2018-09-13
Payer: MEDICARE

## 2018-09-13 VITALS
WEIGHT: 184.5 LBS | DIASTOLIC BLOOD PRESSURE: 64 MMHG | HEART RATE: 64 BPM | BODY MASS INDEX: 32.68 KG/M2 | SYSTOLIC BLOOD PRESSURE: 130 MMHG

## 2018-09-13 DIAGNOSIS — N18.30 STAGE 3 CHRONIC KIDNEY DISEASE (HCC): ICD-10-CM

## 2018-09-13 DIAGNOSIS — I10 ESSENTIAL HYPERTENSION: ICD-10-CM

## 2018-09-13 DIAGNOSIS — E11.22 TYPE 2 DIABETES MELLITUS WITH STAGE 3 CHRONIC KIDNEY DISEASE, UNSPECIFIED WHETHER LONG TERM INSULIN USE: ICD-10-CM

## 2018-09-13 DIAGNOSIS — N18.3 TYPE 2 DIABETES MELLITUS WITH STAGE 3 CHRONIC KIDNEY DISEASE, UNSPECIFIED WHETHER LONG TERM INSULIN USE: ICD-10-CM

## 2018-09-13 DIAGNOSIS — N17.9 AKI (ACUTE KIDNEY INJURY) (HCC): Primary | ICD-10-CM

## 2018-09-13 LAB
PARATHYROID HORMONE INTACT: 60 PG/ML (ref 11–67)
VITAMIN D 25-HYDROXY: 46 NG/ML

## 2018-09-13 PROCEDURE — 4040F PNEUMOC VAC/ADMIN/RCVD: CPT | Performed by: INTERNAL MEDICINE

## 2018-09-13 PROCEDURE — G8427 DOCREV CUR MEDS BY ELIG CLIN: HCPCS | Performed by: INTERNAL MEDICINE

## 2018-09-13 PROCEDURE — G8598 ASA/ANTIPLAT THER USED: HCPCS | Performed by: INTERNAL MEDICINE

## 2018-09-13 PROCEDURE — 1090F PRES/ABSN URINE INCON ASSESS: CPT | Performed by: INTERNAL MEDICINE

## 2018-09-13 PROCEDURE — 1101F PT FALLS ASSESS-DOCD LE1/YR: CPT | Performed by: INTERNAL MEDICINE

## 2018-09-13 PROCEDURE — G8417 CALC BMI ABV UP PARAM F/U: HCPCS | Performed by: INTERNAL MEDICINE

## 2018-09-13 PROCEDURE — 1123F ACP DISCUSS/DSCN MKR DOCD: CPT | Performed by: INTERNAL MEDICINE

## 2018-09-13 PROCEDURE — 1036F TOBACCO NON-USER: CPT | Performed by: INTERNAL MEDICINE

## 2018-09-13 PROCEDURE — 1111F DSCHRG MED/CURRENT MED MERGE: CPT | Performed by: INTERNAL MEDICINE

## 2018-09-13 PROCEDURE — 99213 OFFICE O/P EST LOW 20 MIN: CPT | Performed by: INTERNAL MEDICINE

## 2018-09-13 NOTE — PROGRESS NOTES
Renal Progress Note    Assessment and Plan:      Diagnosis Orders   1. TURNER (acute kidney injury) ([facility]     2. Stage 3 chronic kidney disease     3. Essential hypertension     4. Type 2 diabetes mellitus with stage 3 chronic kidney disease, unspecified whether long term insulin use ([facility])       PLAN:  Reviewed labs with the patient and family   They understood   Serum creatinine is stable at 2.6 mg/dl  Serum creatinine is very variable depending on her diuretic dose   Medications reviewed. No changes. Return visit in 3 months with labs.     Patient Active Problem List   Diagnosis    Prerenal acute renal failure ([facility]    Essential hypertension    Atrial fib/flutter, transient    DJD (degenerative joint disease)    Renal artery stenosis ([facility])    HLD (hyperlipidemia)    DM type 2 (diabetes mellitus, type 2) ([facility])    Anemia    CKD (chronic kidney disease) stage 3, GFR 30-59 ml/min    Coronary artery disease without angina pectoris    Headache    Abnormal EKG    Cardiovascular risk factor    Angina, class II ([facility])    DM (diabetes mellitus) ([facility]    Dyslipidemia    S/P angioplasty with stent    Presence of stent in left circumflex coronary artery    Presence of stent in right coronary artery    Type 2 diabetes mellitus with complication ([facility])    Renovascular hypertension    Hypertensive crisis    UTRNER (acute kidney injury) ([facility]    DVT (deep venous thrombosis) ([facility])    Urge incontinence of urine    Hypertensive emergency    Hypertensive heart disease with heart failure ([facility])    Acute on chronic congestive heart failure ([facility])    CKD (chronic kidney disease)    Coronary artery disease involving native coronary artery of native heart without angina pectoris    Acute diastolic congestive heart failure ([facility])    Accelerated hypertension    Pneumonia of both lungs due to infectious organism    Wheezing    Interstitial nephritis, acute    Acidosis    Anemia of chronic kidney failure    levothyroxine (SYNTHROID) 112 MCG tablet Take 1 tablet by mouth Daily 30 tablet 3    furosemide (LASIX) 40 MG tablet Take 1 tablet by mouth daily 60 tablet 3    ranolazine (RANEXA) 500 MG extended release tablet Take 1 tablet by mouth 2 times daily 56 tablet 3    acetaminophen (TYLENOL) 500 MG tablet Take 500 mg by mouth every 6 hours as needed for Pain      losartan (COZAAR) 100 MG tablet Take 1 tablet by mouth daily 90 tablet 3    Cholecalciferol (VITAMIN D3) 5000 units CAPS Take 1 capsule by mouth daily      isosorbide mononitrate (IMDUR) 120 MG extended release tablet Take 60 mg by mouth daily       metoprolol succinate (TOPROL XL) 50 MG extended release tablet Take 50 mg by mouth daily      oxybutynin (DITROPAN) 5 MG tablet Take 10 mg by mouth daily       famotidine (PEPCID) 20 MG tablet Take 20 mg by mouth daily as needed      hydrALAZINE (APRESOLINE) 100 MG tablet Take 1 tablet by mouth 3 times daily 90 tablet 3    LUTEIN-ZEAXANTHIN PO Take 20 mg by mouth nightly       clopidogrel (PLAVIX) 75 MG tablet Take 1 tablet by mouth daily 90 tablet 3    glipiZIDE (GLUCOTROL) 5 MG tablet Take 5 mg by mouth daily      aspirin 81 MG tablet Take 81 mg by mouth daily.  allopurinol (ZYLOPRIM) 100 MG tablet Take 1 tablet by mouth nightly 90 tablet 1     No current facility-administered medications for this visit.         Lab Results:    CBC:   Lab Results   Component Value Date    WBC 7.4 08/28/2018    HGB 10.1 (L) 08/28/2018    HCT 30.6 (L) 08/28/2018    MCV 97.5 08/28/2018     08/28/2018     BMP:    Lab Results   Component Value Date     09/11/2018     09/11/2018     08/31/2018    K 4.6 09/11/2018    K 4.4 09/11/2018    K 4.8 08/31/2018    CL 98 09/11/2018    CL 98 09/11/2018     08/31/2018    CO2 28 09/11/2018    CO2 27 09/11/2018    CO2 21 (L) 08/31/2018    BUN 75 (H) 09/11/2018    BUN 76 (H) 09/11/2018    BUN 62 (H) 08/31/2018    CREATININE 2.6 (H) 09/11/2018 CREATININE 2.7 (H) 09/11/2018    CREATININE 2.3 (H) 08/31/2018    GLUCOSE 135 (H) 09/11/2018    GLUCOSE 135 (H) 09/11/2018    GLUCOSE 137 (H) 08/31/2018      Hepatic:   Lab Results   Component Value Date    AST 14 09/11/2018    AST 11 08/28/2018    AST 12 08/27/2018    ALT 7 09/11/2018    ALT <5 (L) 08/28/2018    ALT <5 (L) 08/27/2018    BILITOT 0.6 09/11/2018    BILITOT 0.7 08/28/2018    BILITOT 0.9 08/27/2018    ALKPHOS 61 09/11/2018    ALKPHOS 56 08/28/2018    ALKPHOS 66 08/27/2018     BNP: No results found for: BNP  Lipids:   Lab Results   Component Value Date    CHOL 202 (H) 08/28/2018    HDL 33 08/28/2018     INR:   Lab Results   Component Value Date    INR 1.02 08/28/2018    INR 0.98 07/30/2018    INR 0.94 12/23/2016     URINE:   Lab Results   Component Value Date    NAUR 25 12/28/2016     Lab Results   Component Value Date    NITRU NEGATIVE 08/28/2018    COLORU YELLOW 08/28/2018    PHUR 5.0 08/28/2018    WBCUA 0-2 08/28/2018    RBCUA NONE SEEN 08/28/2018    YEAST NONE SEEN 08/28/2018    BACTERIA NONE 08/28/2018    LEUKOCYTESUR NEGATIVE 08/28/2018    UROBILINOGEN 0.2 08/28/2018    BILIRUBINUR NEGATIVE 08/28/2018    BLOODU NEGATIVE 08/28/2018    GLUCOSEU NEGATIVE 08/28/2018    KETUA NEGATIVE 08/28/2018      Microalbumen/Creatinine ratio:  No components found for: RUCREAT    Objective:   Vitals: /64   Pulse 64   Wt 184 lb 8 oz (83.7 kg)   BMI 32.68 kg/m²      Constitutional:  Alert, awake, no apparent distress  Skin:normal  HEENT:Pupils are reactive . Throat is clear  Neck:supple with no thyromegally  Cardiovascular:  S1, S2 without murmur  Respiratory:  Clear  Abdomen: +bs, soft, non tender  Ext: Trace LE edema  Musculoskeletal:Intact  Neuro:Alert and oriented with no deficit    Electronically signed by Tam Pham MD on 9/13/2018 at 2:35 PM

## 2018-09-17 LAB
BUN BLDV-MCNC: 74 MG/DL
CALCIUM SERPL-MCNC: 9.6 MG/DL
CHLORIDE BLD-SCNC: 104 MMOL/L
CO2: 24 MMOL/L
CREAT SERPL-MCNC: 2.4 MG/DL
GFR CALCULATED: NORMAL
GLUCOSE BLD-MCNC: 105 MG/DL
POTASSIUM SERPL-SCNC: 4.5 MMOL/L
SODIUM BLD-SCNC: 142 MMOL/L

## 2018-10-11 LAB
ALBUMIN SERPL-MCNC: 4.1 G/DL (ref 3.5–5.2)
ALK PHOSPHATASE: 62 U/L (ref 30–146)
ALT SERPL-CCNC: 5 U/L (ref 5–40)
ANION GAP SERPL CALCULATED.3IONS-SCNC: 18 MEQ/L (ref 10–19)
AST SERPL-CCNC: 14 U/L (ref 9–40)
BILIRUB SERPL-MCNC: 0.6 MG/DL
BILIRUBIN DIRECT: <0.2 MG/DL
BUN BLDV-MCNC: 75 MG/DL (ref 8–23)
CALCIUM SERPL-MCNC: 9.6 MG/DL (ref 8.5–10.5)
CHLORIDE BLD-SCNC: 100 MEQ/L (ref 95–107)
CHOLESTEROL/HDL RATIO: 4.7
CHOLESTEROL: 179 MG/DL
CO2: 24 MEQ/L (ref 19–31)
CREAT SERPL-MCNC: 3 MG/DL (ref 0.6–1.3)
EGFR AFRICAN AMERICAN: 15.5 ML/MIN/1.73 M2
EGFR IF NONAFRICAN AMERICAN: 13.4 ML/MIN/1.73 M2
GLUCOSE: 80 MG/DL (ref 70–99)
HDLC SERPL-MCNC: 38.4 MG/DL
LDL CHOLESTEROL CALCULATED: 97 MG/DL
LDL/HDL RATIO: 2.5
POTASSIUM SERPL-SCNC: 4.2 MEQ/L (ref 3.5–5.4)
SODIUM BLD-SCNC: 142 MEQ/L (ref 135–146)
TOTAL PROTEIN: 6.5 G/DL (ref 6.1–8.3)
TRIGL SERPL-MCNC: 216 MG/DL
VLDLC SERPL CALC-MCNC: 43 MG/DL

## 2018-10-12 LAB
PARATHYROID HORMONE INTACT: 80 PG/ML (ref 11–67)
VITAMIN D 25-HYDROXY: 50 NG/ML

## 2018-10-30 ENCOUNTER — OFFICE VISIT (OUTPATIENT)
Dept: CARDIOLOGY CLINIC | Age: 83
End: 2018-10-30
Payer: MEDICARE

## 2018-10-30 VITALS
BODY MASS INDEX: 34.41 KG/M2 | DIASTOLIC BLOOD PRESSURE: 69 MMHG | WEIGHT: 194.19 LBS | SYSTOLIC BLOOD PRESSURE: 138 MMHG | HEART RATE: 75 BPM | HEIGHT: 63 IN

## 2018-10-30 DIAGNOSIS — I10 ESSENTIAL HYPERTENSION: ICD-10-CM

## 2018-10-30 DIAGNOSIS — I50.32 CHRONIC DIASTOLIC CONGESTIVE HEART FAILURE (HCC): Primary | ICD-10-CM

## 2018-10-30 DIAGNOSIS — E78.2 MIXED HYPERLIPIDEMIA: ICD-10-CM

## 2018-10-30 DIAGNOSIS — I25.10 CORONARY ARTERY DISEASE INVOLVING NATIVE CORONARY ARTERY OF NATIVE HEART WITHOUT ANGINA PECTORIS: ICD-10-CM

## 2018-10-30 DIAGNOSIS — Z95.820 S/P ANGIOPLASTY WITH STENT: ICD-10-CM

## 2018-10-30 PROCEDURE — G8427 DOCREV CUR MEDS BY ELIG CLIN: HCPCS | Performed by: INTERNAL MEDICINE

## 2018-10-30 PROCEDURE — 1101F PT FALLS ASSESS-DOCD LE1/YR: CPT | Performed by: INTERNAL MEDICINE

## 2018-10-30 PROCEDURE — 1090F PRES/ABSN URINE INCON ASSESS: CPT | Performed by: INTERNAL MEDICINE

## 2018-10-30 PROCEDURE — G8598 ASA/ANTIPLAT THER USED: HCPCS | Performed by: INTERNAL MEDICINE

## 2018-10-30 PROCEDURE — 1036F TOBACCO NON-USER: CPT | Performed by: INTERNAL MEDICINE

## 2018-10-30 PROCEDURE — 4040F PNEUMOC VAC/ADMIN/RCVD: CPT | Performed by: INTERNAL MEDICINE

## 2018-10-30 PROCEDURE — 1123F ACP DISCUSS/DSCN MKR DOCD: CPT | Performed by: INTERNAL MEDICINE

## 2018-10-30 PROCEDURE — G8417 CALC BMI ABV UP PARAM F/U: HCPCS | Performed by: INTERNAL MEDICINE

## 2018-10-30 PROCEDURE — 99213 OFFICE O/P EST LOW 20 MIN: CPT | Performed by: INTERNAL MEDICINE

## 2018-10-30 PROCEDURE — G8484 FLU IMMUNIZE NO ADMIN: HCPCS | Performed by: INTERNAL MEDICINE

## 2018-10-30 RX ORDER — ALLOPURINOL 300 MG/1
300 TABLET ORAL
COMMUNITY
End: 2019-01-01 | Stop reason: DRUGHIGH

## 2018-10-30 NOTE — PROGRESS NOTES
([address]    Hypervolemia    Constipation by delayed colonic transit    Metabolic alkalosis    Severe dehydration    Diabetes mellitus with hyperglycemia (HCC)    TURNER (acute kidney injury) ([address]    Anemia of chronic disease    CKD (chronic kidney disease) stage 4, GFR 15-29 ml/min (HCC)    Osteopenia    Vitamin D deficiency    Anemia of chronic disease    Volume overload    Left atrial dilation    SOB (shortness of breath)    Moderate malnutrition (HCC)    Chronic diastolic congestive heart failure (HCC)    Class 1 obesity in adult    Acute respiratory failure with hypoxia (HCC)    Acute renal failure superimposed on stage 3 chronic kidney disease (HCC)    Metabolic acidosis    Lung nodule    Incidental lung nodule, > 3mm and < 8mm    TURNER (acute kidney injury) ([address]       Past Surgical History:   Procedure Laterality Date    ACHILLES TENDON SURGERY  3/2004    ANUS SURGERY  2010    BACK SURGERY      BLADDER REPAIR  [id number]    CARDIAC CATHETERIZATION  07/30/2018    CARDIAC SURGERY      heart cath    CATARACT REMOVAL Right 12/2007    CATARACT REMOVAL Left 1/2008    CORONARY ANGIOPLASTY WITH STENT PLACEMENT  2015    LAD, RCA    COSMETIC SURGERY  5/2008    eyelid    EKG 12-LEAD  4/25/2015         ENDOSCOPY, COLON, DIAGNOSTIC      EYE SURGERY      FINGER SURGERY Left 8/2010    left index    FOOT SURGERY  10/2004    HYSTERECTOMY  1978    JOINT REPLACEMENT      both knees    KNEE ARTHROPLASTY      R 2/98, L 10/98    LUMBAR SPINE SURGERY  3/4/05    REFRACTIVE SURGERY  2012    THYROIDECTOMY  1958    UPPER GASTROINTESTINAL ENDOSCOPY  2015    Dr. Radha Church       Allergies   Allergen Reactions    Atarax [Hydroxyzine] Other (See Comments)     Unsure of reaction     Biaxin [Clarithromycin]     Ceclor [Cefaclor] Other (See Comments)     Looney for 2 days     Oxycontin [Oxycodone] Other (See Comments)     dizziness    Tape [Adhesive Tape]      Tears skin    Tylenol With Codeine #3

## 2018-11-07 ENCOUNTER — OFFICE VISIT (OUTPATIENT)
Dept: RHEUMATOLOGY | Age: 83
End: 2018-11-07
Payer: MEDICARE

## 2018-11-07 VITALS
HEART RATE: 54 BPM | DIASTOLIC BLOOD PRESSURE: 86 MMHG | SYSTOLIC BLOOD PRESSURE: 132 MMHG | BODY MASS INDEX: 33.66 KG/M2 | OXYGEN SATURATION: 98 % | WEIGHT: 190 LBS

## 2018-11-07 DIAGNOSIS — M25.50 POLYARTHRALGIA: Primary | ICD-10-CM

## 2018-11-07 DIAGNOSIS — Z87.39 H/O: GOUT: ICD-10-CM

## 2018-11-07 PROCEDURE — 1101F PT FALLS ASSESS-DOCD LE1/YR: CPT | Performed by: INTERNAL MEDICINE

## 2018-11-07 PROCEDURE — 4040F PNEUMOC VAC/ADMIN/RCVD: CPT | Performed by: INTERNAL MEDICINE

## 2018-11-07 PROCEDURE — G8427 DOCREV CUR MEDS BY ELIG CLIN: HCPCS | Performed by: INTERNAL MEDICINE

## 2018-11-07 PROCEDURE — 1036F TOBACCO NON-USER: CPT | Performed by: INTERNAL MEDICINE

## 2018-11-07 PROCEDURE — G8484 FLU IMMUNIZE NO ADMIN: HCPCS | Performed by: INTERNAL MEDICINE

## 2018-11-07 PROCEDURE — 1123F ACP DISCUSS/DSCN MKR DOCD: CPT | Performed by: INTERNAL MEDICINE

## 2018-11-07 PROCEDURE — 99214 OFFICE O/P EST MOD 30 MIN: CPT | Performed by: INTERNAL MEDICINE

## 2018-11-07 PROCEDURE — G8598 ASA/ANTIPLAT THER USED: HCPCS | Performed by: INTERNAL MEDICINE

## 2018-11-07 PROCEDURE — G8417 CALC BMI ABV UP PARAM F/U: HCPCS | Performed by: INTERNAL MEDICINE

## 2018-11-07 PROCEDURE — 1090F PRES/ABSN URINE INCON ASSESS: CPT | Performed by: INTERNAL MEDICINE

## 2018-11-07 ASSESSMENT — JOINT PAIN
TOTAL NUMBER OF TENDER JOINTS: 4
TOTAL NUMBER OF SWOLLEN JOINTS: 3

## 2018-11-07 ASSESSMENT — ENCOUNTER SYMPTOMS
DIARRHEA: 1
BACK PAIN: 1
RESPIRATORY NEGATIVE: 1
ORTHOPNEA: 0
EYES NEGATIVE: 1
BLOOD IN STOOL: 0
CONSTIPATION: 1

## 2018-11-07 NOTE — PROGRESS NOTES
Muscle strength 5/5, FROM, No Synovitis   Tender outer hips bilat  Knees: medial joint space tenderness  Ankles: medial joint space tenderness   Feet: MTP compression testing bilat. - 5th toe with limited ROM    - hammer deformities of the b/lc 5th toes. And left 2,3 toes. Spine: limited cervical side bending bilat. Tender/hypertonic traps/rhomboid region. Negative spurling   - tenderness of the lower lumbar spine and sacral region. Negative SLR and Cross SLR. Neuro: DTR's 2/4 bilat and equal  Psych: Oriented to person, place, time. No anxiety or agitation. Skin: Warm and dry. No nodule on exposed extremities. No rash on exposed extremities. - nail thickening of the right 1,2 toenails  - fluid vescicle/blistering along bilateral lower legs, (+) erytema in the same distribution  - chalky white subq deposits along the Right 2nd toe   Lymph: No cervical LAD. No supraclavicular LAD.       Physical Exam     Tenderness:   RUE: ulnohumeral and radiohumeral  Right hand: 2nd MCP and 3rd MCP  Left hand: 3rd MCP    Swelling:   RUE: ulnohumeral and radiohumeral  Right hand: 2nd MCP and 3rd MCP    YOUNG-28 tender joint count: 4  YOUNG-28 swollen joint count: 3    RAPID3 Composite Score  MDHAQ (0-10):   Patient pain VAS (0-10):   Patient global assessment VAS (0-10):      RAPID3 Total Score:   Remission: <3  Low Disease Activity: <6  Moderate Disease Activity: >=6 and <=12  High Disease Activity: >12    LABS:  CBC  Lab Results   Component Value Date    WBC 7.4 08/28/2018    RBC 3.14 08/28/2018    RBC 3.48 01/13/2016    HGB 10.1 08/28/2018    HCT 30.6 08/28/2018    MCV 97.5 08/28/2018    MCH 32.2 08/28/2018    MCHC 33.0 08/28/2018    RDW 14.4 02/27/2018     08/28/2018       CMP  Lab Results   Component Value Date    CALCIUM 9.6 10/10/2018    LABALBU 4.1 10/10/2018    PROT 6.5 10/10/2018     10/10/2018    K 4.2 10/10/2018    K 4.5 08/28/2018    CO2 24 10/10/2018     10/10/2018    BUN 75 10/10/2018 noted Chondrocalcinosis of the left wrist.               - avoiding NSAIDs and colchicine given the CKD   -  Mother , sister and daughter with reported similar arthritic hands. Rheumatism in both mother and father  - ? Inflammatory OA vs related to gout. - if sx's continue will discuss other treatment options at the next visit. Osteoarthritis bilateral knees:   - s/p bilateral total knee replacement. - stable. Chronic low back pain:   - imaging with DJD, DDD and thoracolumbar scoliosis. - prior tx: physical therapy s/p surgery, epidural injections, Avoiding NSAIDs given CKD, intolerance with several pain medications. Cymbalta would not be recommended given her  GFR. -limited beside opiods for pain relief. Cervicalgia:   - known hx of spinal stenosis of lower cervical spine on MRI from 2015    - exam with limited side bending bilaterally. Negative spurling   - Previous side effects with multiple pain medications. - Limited additional treatment options    -  evaluation by pain management may be beneficial.     Medication monitoring:   - Creatinine recently increased with stable uric acid at less than 5 mg/dL. Return in about 6 months (around 5/7/2019). Electronically signed by Nahomy Avila DO on 11/7/2018 at 3:22 PM       PROCEDURE NOTE:     Date: 11/7/2018    Right elbow Arthrocentesis    Indication: pain, swelling    After consent was obtained, using sterile technique the Right elbow was prepped w/ Betadine and Chlorhexidine. Ethyl chloride was used as local anesthetic. The area was and 0 ml's of fluid was withdrawn. Kenalog 20mg  and 1 ml plain Lidocaine was then injected and the needle withdrawn. The procedure was well tolerated. The patient is asked to continue to rest the joint for a few more days before resuming regular activities. It may be more painful for the first 1-2 days. Watch for fever, or increased swelling or persistent pain in the joint.  Call or return

## 2018-11-07 NOTE — LETTER
[address] [facility] [address]    Informed Consent for Injections    Patient Name: Paty Fernandez  : 1931  Procedure: _______________________________________________________     Corticosteroid (steroid) injections are used to improve joint function, reduce pain, and reduce local joint inflammation. A needle is inserted into the joint and a local anesthetic (e.g. lidocaine, procaine, bupivacaine) or anti- inflammatory/steroid is injected. Steroids are injected directly into joints to treat conditions such as rheumatoid arthritis, gout, or other inflammatory disease. Steroids can also be injected into inflamed bursae, or around tendons near the shoulder, elbow, hip, knee, hand, or wrist. Additional treatment may be needed to achieve sustained relief. I understand and accept the most likely risks and complications of steroid injections, which include but are not limited to: Infection, Allergic Reactions, Rupture of a tendon, Skin Discoloration, Needle Breakage, Numbness, Trauma to Nerves, Vasovagal Reaction (fainting), Soft Tissue Swelling and/ or Bruising. I understand and accept that there are complications, including the remote risk of death or serious disability that exists with any procedure. I understand and accept the anticipated outcomes: Increased circulation to the muscles, Increased exercise tolerance, Increased pain threshold at the point of injection, Increased range motion, Pain reduction, Temporary increased muscle spasm and/or Temporary and post-injection pain. Local pain from tissue and/or nerve irritation, dimpling of/depression in skin. I have informed the physician of all my known allergies. I have informed the doctor of all medications I am currently taking, including prescription drugs, over the counter medications herbal therapies and supplements, aspirin and any other recreational drug or alcohol use.

## 2018-11-26 NOTE — PROGRESS NOTES
Ariadna FOR PROLIA INJECTION. INJECTION EXPLAINED AND QUESTIONS ANSWERED. PT RIGHTS AND RESPONSIBILITIES OFFERED TO PT.  3050 INJECTION GIVEN. PT TOLERATED IT WELL. PT DISCHARGED VIA WHEELCHAIR WITH INSTRUCTIONS WITH NO COMPLAINTS.            _M___ Safety:       (Environmental)   [place] to environment   Ensure ID band is correct and in place/ allergy band as needed   Assess for fall risk   Initiate fall precautions as applicable (fall band, side rails, etc.)   Call light within reach   Bed in low position/ wheels locked    __M__ Pain:        Assess pain level and characteristics   Administer analgesics as ordered   Assess effectiveness of pain management and report to MD as needed    _M___ Knowledge Deficit:   Assess baseline knowledge   Provide teaching at level of understanding   Provide teaching via preferred learning method   Evaluate teaching effectiveness    __M__ Hemodynamic/Respiratory Status:       (Pre and Post Procedure Monitoring)   Assess/Monitor vital signs and LOC   Assess Baseline SpO2 prior to any sedation   Obtain weight/height   Assess vital signs/ LOC until patient meets discharge criteria   Monitor procedure site and notify MD of any issues

## 2018-12-13 NOTE — PROGRESS NOTES
nephritis, acute    Acidosis    Anemia of chronic kidney failure    Pneumonia of both lower lobes due to infectious organism ([address]    Hypervolemia    Constipation by delayed colonic transit    Metabolic alkalosis    Severe dehydration    Diabetes mellitus with hyperglycemia (HCC)    TURNER (acute kidney injury) ([address]    Anemia of chronic disease    CKD (chronic kidney disease) stage 4, GFR 15-29 ml/min (HCC)    Osteopenia    Vitamin D deficiency    Anemia of chronic disease    Volume overload    Left atrial dilation    SOB (shortness of breath)    Moderate malnutrition (HCC)    Chronic diastolic congestive heart failure (HCC)    Class 1 obesity in adult    Acute respiratory failure with hypoxia (HCC)    Acute renal failure superimposed on stage 3 chronic kidney disease (HCC)    Metabolic acidosis    Lung nodule    Incidental lung nodule, > 3mm and < 8mm    TURNER (acute kidney injury) ([facility] [address]       Subjective:   Chief complaint:  Chief Complaint   Patient presents with    Chronic Kidney Disease     Stage IV      HPI:This is a follow up visit for Mrs. Edmond Arias who is here today for a return appointment. I see her for chronic kidney disease. She was last seen about 3 months ago with a serum creatinine of 2.6 mg per deciliter. Today it is 2.5 mg per  deciliter. She is doing well with no complaints. In October however the serum creatinine went up to 3.0 mg/dL.     ROS:Constitutional: negative  Eyes: negative  Ears, nose, mouth, throat, and face: negative  Respiratory: negative  Cardiovascular: positive for lower extremity edema  Gastrointestinal: negative  Genitourinary:negative  Integument/breast: negative  Hematologic/lymphatic: negative  Musculoskeletal:positive for arthralgias and stiff joints  Neurological: positive for coordination problems and gait problems  Behavioral/Psych: negative  Endocrine: negative  Allergic/Immunologic: negative  Medications:     Current Outpatient Prescriptions   Medication Sig Dispense Refill    isosorbide mononitrate (IMDUR) 60 MG extended release tablet Take 60 mg by mouth daily      allopurinol (ZYLOPRIM) 300 MG tablet Take 300 mg by mouth every morning (before breakfast)      atorvastatin (LIPITOR) 20 MG tablet Take 1 tablet by mouth daily 30 tablet 5    levothyroxine (SYNTHROID) 112 MCG tablet Take 1 tablet by mouth Daily 30 tablet 3    furosemide (LASIX) 40 MG tablet Take 1 tablet by mouth daily 60 tablet 3    acetaminophen (TYLENOL) 500 MG tablet Take 500 mg by mouth every 6 hours as needed for Pain      losartan (COZAAR) 100 MG tablet Take 1 tablet by mouth daily 90 tablet 3    Cholecalciferol (VITAMIN D3) 5000 units CAPS Take 1 capsule by mouth daily      metoprolol succinate (TOPROL XL) 50 MG extended release tablet Take 50 mg by mouth daily      oxybutynin (DITROPAN) 5 MG tablet Take 10 mg by mouth daily       famotidine (PEPCID) 20 MG tablet Take 20 mg by mouth daily as needed      hydrALAZINE (APRESOLINE) 100 MG tablet Take 1 tablet by mouth 3 times daily 90 tablet 3    LUTEIN-ZEAXANTHIN PO Take 20 mg by mouth nightly       clopidogrel (PLAVIX) 75 MG tablet Take 1 tablet by mouth daily 90 tablet 3    glipiZIDE (GLUCOTROL) 5 MG tablet Take 5 mg by mouth daily      aspirin 81 MG tablet Take 81 mg by mouth daily. No current facility-administered medications for this visit.         Lab Results:    CBC:   Lab Results   Component Value Date    WBC 7.4 08/28/2018    HGB 10.1 (L) 08/28/2018    HCT 30.6 (L) 08/28/2018    MCV 97.5 08/28/2018     08/28/2018     BMP:    Lab Results   Component Value Date     12/10/2018     10/10/2018     09/17/2018    K 4.9 12/10/2018    K 4.2 10/10/2018    K 4.5 09/17/2018     12/10/2018     10/10/2018     09/17/2018    CO2 25 12/10/2018    CO2 24 10/10/2018    CO2 24 09/17/2018    BUN 74 (H) 12/10/2018    BUN 75 (H) 10/10/2018    BUN 74 09/17/2018

## 2018-12-31 NOTE — TELEPHONE ENCOUNTER
12/31/2018 Patient called office asking if she needs to do lab work prior to her appointment with Dr. Brittanie Naranjo on Thursday 01/03/2019. Please advise patient at [phone]. da

## 2019-01-01 ENCOUNTER — APPOINTMENT (OUTPATIENT)
Dept: GENERAL RADIOLOGY | Age: 84
DRG: 551 | End: 2019-01-01
Payer: MEDICARE

## 2019-01-01 ENCOUNTER — HOSPITAL ENCOUNTER (OUTPATIENT)
Age: 84
Discharge: HOME | End: 2019-04-26
Payer: MEDICARE

## 2019-01-01 ENCOUNTER — APPOINTMENT (OUTPATIENT)
Dept: ULTRASOUND IMAGING | Age: 84
DRG: 551 | End: 2019-01-01
Payer: MEDICARE

## 2019-01-01 ENCOUNTER — TELEPHONE (OUTPATIENT)
Dept: NEPHROLOGY | Age: 84
End: 2019-01-01

## 2019-01-01 ENCOUNTER — OFFICE VISIT (OUTPATIENT)
Dept: CARDIOLOGY CLINIC | Age: 84
End: 2019-01-01
Payer: MEDICARE

## 2019-01-01 ENCOUNTER — APPOINTMENT (OUTPATIENT)
Dept: GENERAL RADIOLOGY | Age: 84
End: 2019-01-01
Payer: MEDICARE

## 2019-01-01 ENCOUNTER — HOSPITAL ENCOUNTER (EMERGENCY)
Age: 84
Discharge: HOME | End: 2019-08-02
Attending: EMERGENCY MEDICINE
Payer: MEDICARE

## 2019-01-01 ENCOUNTER — OFFICE VISIT (OUTPATIENT)
Dept: RHEUMATOLOGY | Age: 84
End: 2019-01-01
Payer: MEDICARE

## 2019-01-01 ENCOUNTER — TELEPHONE (OUTPATIENT)
Dept: RHEUMATOLOGY | Age: 84
End: 2019-01-01

## 2019-01-01 ENCOUNTER — APPOINTMENT (OUTPATIENT)
Dept: CT IMAGING | Age: 84
End: 2019-01-01
Payer: MEDICARE

## 2019-01-01 ENCOUNTER — OFFICE VISIT (OUTPATIENT)
Dept: NEPHROLOGY | Age: 84
End: 2019-01-01
Payer: MEDICARE

## 2019-01-01 ENCOUNTER — APPOINTMENT (OUTPATIENT)
Dept: INTERVENTIONAL RADIOLOGY/VASCULAR | Age: 84
DRG: 551 | End: 2019-01-01
Payer: MEDICARE

## 2019-01-01 ENCOUNTER — ANESTHESIA EVENT (OUTPATIENT)
Dept: ENDOSCOPY | Age: 84
DRG: 551 | End: 2019-01-01
Payer: MEDICARE

## 2019-01-01 ENCOUNTER — HOSPITAL ENCOUNTER (INPATIENT)
Age: 84
LOS: 17 days | Discharge: ANOTHER HOSPITAL | DRG: 551 | End: 2019-11-06
Attending: EMERGENCY MEDICINE | Admitting: PHYSICIAN ASSISTANT
Payer: MEDICARE

## 2019-01-01 ENCOUNTER — HOSPITAL ENCOUNTER (OUTPATIENT)
Dept: GENERAL RADIOLOGY | Age: 84
Discharge: HOME | End: 2019-04-26
Payer: MEDICARE

## 2019-01-01 ENCOUNTER — ANESTHESIA (OUTPATIENT)
Dept: ENDOSCOPY | Age: 84
DRG: 551 | End: 2019-01-01
Payer: MEDICARE

## 2019-01-01 ENCOUNTER — APPOINTMENT (OUTPATIENT)
Dept: CT IMAGING | Age: 84
DRG: 551 | End: 2019-01-01
Payer: MEDICARE

## 2019-01-01 ENCOUNTER — APPOINTMENT (OUTPATIENT)
Dept: MRI IMAGING | Age: 84
DRG: 551 | End: 2019-01-01
Payer: MEDICARE

## 2019-01-01 ENCOUNTER — HOSPITAL ENCOUNTER (OUTPATIENT)
Dept: NURSING | Age: 84
Discharge: HOME | End: 2019-03-15
Payer: MEDICARE

## 2019-01-01 VITALS
DIASTOLIC BLOOD PRESSURE: 58 MMHG | SYSTOLIC BLOOD PRESSURE: 129 MMHG | BODY MASS INDEX: 30.3 KG/M2 | HEART RATE: 73 BPM | WEIGHT: 171 LBS | OXYGEN SATURATION: 99 %

## 2019-01-01 VITALS
DIASTOLIC BLOOD PRESSURE: 63 MMHG | HEIGHT: 63 IN | HEART RATE: 72 BPM | BODY MASS INDEX: 33.03 KG/M2 | WEIGHT: 186.4 LBS | SYSTOLIC BLOOD PRESSURE: 155 MMHG

## 2019-01-01 VITALS
OXYGEN SATURATION: 97 % | BODY MASS INDEX: 35.79 KG/M2 | HEART RATE: 63 BPM | DIASTOLIC BLOOD PRESSURE: 78 MMHG | SYSTOLIC BLOOD PRESSURE: 130 MMHG | HEIGHT: 63 IN

## 2019-01-01 VITALS
RESPIRATION RATE: 20 BRPM | WEIGHT: 183 LBS | SYSTOLIC BLOOD PRESSURE: 127 MMHG | BODY MASS INDEX: 32.43 KG/M2 | HEIGHT: 63 IN | TEMPERATURE: 98.5 F | OXYGEN SATURATION: 94 % | HEART RATE: 61 BPM | DIASTOLIC BLOOD PRESSURE: 50 MMHG

## 2019-01-01 VITALS
BODY MASS INDEX: 35.78 KG/M2 | SYSTOLIC BLOOD PRESSURE: 154 MMHG | WEIGHT: 202 LBS | DIASTOLIC BLOOD PRESSURE: 78 MMHG | HEART RATE: 62 BPM | OXYGEN SATURATION: 96 %

## 2019-01-01 VITALS
OXYGEN SATURATION: 97 % | RESPIRATION RATE: 18 BRPM | SYSTOLIC BLOOD PRESSURE: 143 MMHG | DIASTOLIC BLOOD PRESSURE: 73 MMHG | TEMPERATURE: 97.5 F | HEART RATE: 71 BPM

## 2019-01-01 VITALS
HEART RATE: 88 BPM | BODY MASS INDEX: 32.53 KG/M2 | WEIGHT: 183.6 LBS | SYSTOLIC BLOOD PRESSURE: 100 MMHG | OXYGEN SATURATION: 93 % | DIASTOLIC BLOOD PRESSURE: 56 MMHG

## 2019-01-01 VITALS
BODY MASS INDEX: 34.06 KG/M2 | HEART RATE: 56 BPM | DIASTOLIC BLOOD PRESSURE: 68 MMHG | SYSTOLIC BLOOD PRESSURE: 132 MMHG | WEIGHT: 192.2 LBS | OXYGEN SATURATION: 97 %

## 2019-01-01 VITALS
BODY MASS INDEX: 33.05 KG/M2 | HEIGHT: 63 IN | HEART RATE: 78 BPM | DIASTOLIC BLOOD PRESSURE: 70 MMHG | WEIGHT: 186.51 LBS | OXYGEN SATURATION: 97 % | SYSTOLIC BLOOD PRESSURE: 130 MMHG

## 2019-01-01 VITALS
HEIGHT: 63 IN | WEIGHT: 194 LBS | HEART RATE: 67 BPM | DIASTOLIC BLOOD PRESSURE: 62 MMHG | SYSTOLIC BLOOD PRESSURE: 138 MMHG | BODY MASS INDEX: 34.38 KG/M2

## 2019-01-01 VITALS
HEIGHT: 62 IN | SYSTOLIC BLOOD PRESSURE: 116 MMHG | WEIGHT: 191.8 LBS | DIASTOLIC BLOOD PRESSURE: 57 MMHG | OXYGEN SATURATION: 95 % | HEART RATE: 77 BPM | RESPIRATION RATE: 20 BRPM | BODY MASS INDEX: 35.3 KG/M2 | TEMPERATURE: 97.5 F

## 2019-01-01 VITALS
RESPIRATION RATE: 17 BRPM | OXYGEN SATURATION: 96 % | SYSTOLIC BLOOD PRESSURE: 182 MMHG | DIASTOLIC BLOOD PRESSURE: 77 MMHG

## 2019-01-01 DIAGNOSIS — Z95.5 PRESENCE OF STENT IN LEFT CIRCUMFLEX CORONARY ARTERY: ICD-10-CM

## 2019-01-01 DIAGNOSIS — I10 ESSENTIAL HYPERTENSION: ICD-10-CM

## 2019-01-01 DIAGNOSIS — N17.9 AKI (ACUTE KIDNEY INJURY) (HCC): ICD-10-CM

## 2019-01-01 DIAGNOSIS — D63.8 ANEMIA OF CHRONIC DISEASE: ICD-10-CM

## 2019-01-01 DIAGNOSIS — M25.50 POLYARTHRALGIA: Primary | ICD-10-CM

## 2019-01-01 DIAGNOSIS — N18.4 STAGE 4 CHRONIC KIDNEY DISEASE (HCC): ICD-10-CM

## 2019-01-01 DIAGNOSIS — R26.89 BALANCE PROBLEM: ICD-10-CM

## 2019-01-01 DIAGNOSIS — E55.9 VITAMIN D DEFICIENCY: ICD-10-CM

## 2019-01-01 DIAGNOSIS — I25.10 CORONARY ARTERY DISEASE INVOLVING NATIVE CORONARY ARTERY OF NATIVE HEART WITHOUT ANGINA PECTORIS: Primary | ICD-10-CM

## 2019-01-01 DIAGNOSIS — R42 DIZZINESS: ICD-10-CM

## 2019-01-01 DIAGNOSIS — R06.02 SOB (SHORTNESS OF BREATH): ICD-10-CM

## 2019-01-01 DIAGNOSIS — I50.32 CHRONIC DIASTOLIC CONGESTIVE HEART FAILURE (HCC): ICD-10-CM

## 2019-01-01 DIAGNOSIS — E11.22 TYPE 2 DIABETES MELLITUS WITH STAGE 4 CHRONIC KIDNEY DISEASE, UNSPECIFIED WHETHER LONG TERM INSULIN USE (HCC): ICD-10-CM

## 2019-01-01 DIAGNOSIS — M19.242 OTHER SECONDARY OSTEOARTHRITIS OF BOTH HANDS: ICD-10-CM

## 2019-01-01 DIAGNOSIS — D63.8 ANEMIA, CHRONIC DISEASE: ICD-10-CM

## 2019-01-01 DIAGNOSIS — N18.4 STAGE 4 CHRONIC KIDNEY DISEASE (HCC): Primary | ICD-10-CM

## 2019-01-01 DIAGNOSIS — D50.8 IRON DEFICIENCY ANEMIA DUE TO DIETARY CAUSES: ICD-10-CM

## 2019-01-01 DIAGNOSIS — N18.4 TYPE 2 DIABETES MELLITUS WITH STAGE 4 CHRONIC KIDNEY DISEASE, UNSPECIFIED WHETHER LONG TERM INSULIN USE (HCC): ICD-10-CM

## 2019-01-01 DIAGNOSIS — M85.89 OSTEOPENIA OF MULTIPLE SITES: ICD-10-CM

## 2019-01-01 DIAGNOSIS — N18.4 CKD (CHRONIC KIDNEY DISEASE), STAGE IV (HCC): ICD-10-CM

## 2019-01-01 DIAGNOSIS — D63.8 ANEMIA OF CHRONIC DISEASE: Primary | ICD-10-CM

## 2019-01-01 DIAGNOSIS — I50.32 CHRONIC DIASTOLIC HEART FAILURE (HCC): ICD-10-CM

## 2019-01-01 DIAGNOSIS — N18.4 CHRONIC RENAL INSUFFICIENCY, STAGE 4 (SEVERE) (HCC): ICD-10-CM

## 2019-01-01 DIAGNOSIS — N18.4 CKD (CHRONIC KIDNEY DISEASE), STAGE IV (HCC): Primary | ICD-10-CM

## 2019-01-01 DIAGNOSIS — M17.0 OSTEOARTHRITIS OF BOTH KNEES, UNSPECIFIED OSTEOARTHRITIS TYPE: ICD-10-CM

## 2019-01-01 DIAGNOSIS — E78.2 MIXED HYPERLIPIDEMIA: ICD-10-CM

## 2019-01-01 DIAGNOSIS — J20.9 ACUTE BRONCHITIS, UNSPECIFIED ORGANISM: ICD-10-CM

## 2019-01-01 DIAGNOSIS — R77.8 ELEVATED TROPONIN: ICD-10-CM

## 2019-01-01 DIAGNOSIS — Z95.5 PRESENCE OF STENT IN RIGHT CORONARY ARTERY: ICD-10-CM

## 2019-01-01 DIAGNOSIS — S37.009A INJURY OF KIDNEY, UNSPECIFIED LATERALITY, INITIAL ENCOUNTER: Primary | ICD-10-CM

## 2019-01-01 DIAGNOSIS — R79.89 CREATININE ELEVATION: ICD-10-CM

## 2019-01-01 DIAGNOSIS — N17.9 AKI (ACUTE KIDNEY INJURY) (HCC): Primary | ICD-10-CM

## 2019-01-01 DIAGNOSIS — M54.59 INTRACTABLE LOW BACK PAIN: Primary | ICD-10-CM

## 2019-01-01 DIAGNOSIS — M19.241 OTHER SECONDARY OSTEOARTHRITIS OF BOTH HANDS: ICD-10-CM

## 2019-01-01 DIAGNOSIS — M1A.09X0 IDIOPATHIC CHRONIC GOUT OF MULTIPLE SITES WITHOUT TOPHUS: ICD-10-CM

## 2019-01-01 DIAGNOSIS — M1A.09X0 IDIOPATHIC CHRONIC GOUT OF MULTIPLE SITES WITHOUT TOPHUS: Primary | ICD-10-CM

## 2019-01-01 DIAGNOSIS — N18.4 CHRONIC KIDNEY DISEASE, STAGE IV (SEVERE) (HCC): ICD-10-CM

## 2019-01-01 DIAGNOSIS — Z11.59 NEED FOR HEPATITIS B SCREENING TEST: ICD-10-CM

## 2019-01-01 DIAGNOSIS — Z95.820 S/P ANGIOPLASTY WITH STENT: ICD-10-CM

## 2019-01-01 DIAGNOSIS — R19.7 DIARRHEA, UNSPECIFIED TYPE: Primary | ICD-10-CM

## 2019-01-01 LAB
ABO: NORMAL
ADENOVIRUS F 40 41 PCR: NOT DETECTED
ALBUMIN SERPL-MCNC: 2.5 G/DL (ref 3.5–5.1)
ALBUMIN SERPL-MCNC: 3.7 G/DL (ref 3.5–5.1)
ALBUMIN SERPL-MCNC: 3.9 G/DL (ref 3.2–5.3)
ALBUMIN SERPL-MCNC: 4 G/DL (ref 3.5–5.1)
ALK PHOSPHATASE: 65 U/L (ref 39–130)
ALLEN TEST: POSITIVE
ALP BLD-CCNC: 61 U/L (ref 38–126)
ALP BLD-CCNC: 68 U/L (ref 38–126)
ALP BLD-CCNC: 69 U/L (ref 38–126)
ALT SERPL-CCNC: 37 U/L (ref 11–66)
ALT SERPL-CCNC: 6 U/L (ref 0–31)
ALT SERPL-CCNC: 6 U/L (ref 11–66)
ALT SERPL-CCNC: 8 U/L (ref 11–66)
AMMONIA: 37 UMOL/L (ref 11–60)
ANION GAP SERPL CALCULATED.3IONS-SCNC: 12 MMOL/L (ref 4–12)
ANION GAP SERPL CALCULATED.3IONS-SCNC: 13 MEQ/L (ref 8–16)
ANION GAP SERPL CALCULATED.3IONS-SCNC: 13 MEQ/L (ref 8–16)
ANION GAP SERPL CALCULATED.3IONS-SCNC: 13 MMOL/L (ref 4–12)
ANION GAP SERPL CALCULATED.3IONS-SCNC: 14 MEQ/L (ref 8–16)
ANION GAP SERPL CALCULATED.3IONS-SCNC: 14 MMOL/L (ref 4–12)
ANION GAP SERPL CALCULATED.3IONS-SCNC: 15 MEQ/L (ref 10–19)
ANION GAP SERPL CALCULATED.3IONS-SCNC: 15 MEQ/L (ref 8–16)
ANION GAP SERPL CALCULATED.3IONS-SCNC: 16 MEQ/L (ref 8–16)
ANION GAP SERPL CALCULATED.3IONS-SCNC: 17 MEQ/L (ref 8–16)
ANION GAP SERPL CALCULATED.3IONS-SCNC: 17 MEQ/L (ref 8–16)
ANION GAP SERPL CALCULATED.3IONS-SCNC: 18 MEQ/L (ref 8–16)
ANION GAP SERPL CALCULATED.3IONS-SCNC: 19 MEQ/L (ref 8–16)
ANION GAP SERPL CALCULATED.3IONS-SCNC: 8 MMOL/L (ref 4–12)
ANTIBODY SCREEN: NORMAL
APTT: 27.6 SECONDS (ref 22–38)
APTT: 28.8 SECONDS (ref 22–38)
APTT: 30.4 SECONDS (ref 22–38)
APTT: 30.6 SECONDS (ref 22–38)
APTT: 68.4 SECONDS (ref 22–38)
APTT: 77 SECONDS (ref 22–38)
APTT: 78.4 SECONDS (ref 22–38)
APTT: 80 SECONDS (ref 22–38)
APTT: 81.9 SECONDS (ref 22–38)
APTT: 83.4 SECONDS (ref 22–38)
APTT: 88.3 SECONDS (ref 22–38)
APTT: 89.4 SECONDS (ref 22–38)
APTT: 94.5 SECONDS (ref 22–38)
AST SERPL-CCNC: 14 U/L (ref 5–40)
AST SERPL-CCNC: 17 U/L (ref 5–40)
AST SERPL-CCNC: 19 U/L (ref 0–41)
AST SERPL-CCNC: 44 U/L (ref 5–40)
ASTROVIRUS PCR: NOT DETECTED
AVERAGE GLUCOSE: 99 MG/DL (ref 70–126)
BAL CHARACTER: ABNORMAL
BAL COLLECTION SITE: ABNORMAL
BAL COLOR: ABNORMAL
BASE EXCESS (CALCULATED): -0.2 MMOL/L (ref -2.5–2.5)
BASE EXCESS (CALCULATED): -2.4 MMOL/L (ref -2.5–2.5)
BASE EXCESS (CALCULATED): -2.4 MMOL/L (ref -2.5–2.5)
BASE EXCESS (CALCULATED): -2.5 MMOL/L (ref -2.5–2.5)
BASE EXCESS (CALCULATED): 1.8 MMOL/L (ref -2.5–2.5)
BASOPHILS # BLD: 0.1 %
BASOPHILS # BLD: 0.1 %
BASOPHILS # BLD: 0.5 %
BASOPHILS # BLD: 0.8 %
BASOPHILS ABSOLUTE: 0 THOU/MM3 (ref 0–0.1)
BILIRUB SERPL-MCNC: 0.4 MG/DL (ref 0.3–1.2)
BILIRUB SERPL-MCNC: 0.4 MG/DL (ref 0.3–1.2)
BILIRUB SERPL-MCNC: 0.7 MG/DL (ref 0.3–1.2)
BILIRUB SERPL-MCNC: 0.7 MG/DL (ref 0.3–1.2)
BILIRUBIN DIRECT: < 0.2 MG/DL (ref 0–0.3)
BILIRUBIN DIRECT: < 0.2 MG/DL (ref 0–0.3)
BILIRUBIN URINE: NEGATIVE
BLOOD, URINE: NEGATIVE
BUN BLDV-MCNC: 100 MG/DL (ref 7–22)
BUN BLDV-MCNC: 102 MG/DL (ref 7–22)
BUN BLDV-MCNC: 103 MG/DL (ref 7–22)
BUN BLDV-MCNC: 106 MG/DL (ref 7–22)
BUN BLDV-MCNC: 106 MG/DL (ref 7–22)
BUN BLDV-MCNC: 107 MG/DL (ref 5–27)
BUN BLDV-MCNC: 109 MG/DL (ref 7–22)
BUN BLDV-MCNC: 119 MG/DL (ref 7–22)
BUN BLDV-MCNC: 120 MG/DL (ref 7–22)
BUN BLDV-MCNC: 121 MG/DL (ref 7–22)
BUN BLDV-MCNC: 127 MG/DL (ref 7–22)
BUN BLDV-MCNC: 131 MG/DL (ref 7–22)
BUN BLDV-MCNC: 137 MG/DL (ref 7–22)
BUN BLDV-MCNC: 54 MG/DL (ref 8–23)
BUN BLDV-MCNC: 65 MG/DL (ref 7–22)
BUN BLDV-MCNC: 67 MG/DL (ref 5–27)
BUN BLDV-MCNC: 79 MG/DL (ref 7–22)
BUN BLDV-MCNC: 79 MG/DL (ref 7–22)
BUN BLDV-MCNC: 86 MG/DL (ref 5–27)
BUN BLDV-MCNC: 87 MG/DL (ref 5–27)
BUN BLDV-MCNC: 94 MG/DL (ref 7–22)
BUN BLDV-MCNC: 94 MG/DL (ref 7–22)
BUN BLDV-MCNC: 96 MG/DL (ref 7–22)
BUN BLDV-MCNC: 96 MG/DL (ref 7–22)
BUN BLDV-MCNC: 97 MG/DL (ref 7–22)
BUN BLDV-MCNC: 97 MG/DL (ref 7–22)
BUN BLDV-MCNC: 99 MG/DL (ref 7–22)
C-REACTIVE PROTEIN: <0.1 MG/DL (ref 0–0.74)
CALCIUM SERPL-MCNC: 10.2 MG/DL (ref 8.5–10.5)
CALCIUM SERPL-MCNC: 10.4 MG/DL (ref 8.5–10.5)
CALCIUM SERPL-MCNC: 10.5 MG/DL (ref 8.5–10.5)
CALCIUM SERPL-MCNC: 8.3 MG/DL (ref 8.5–10.5)
CALCIUM SERPL-MCNC: 8.3 MG/DL (ref 8.5–10.5)
CALCIUM SERPL-MCNC: 8.4 MG/DL (ref 8.5–10.5)
CALCIUM SERPL-MCNC: 8.4 MG/DL (ref 8.5–10.5)
CALCIUM SERPL-MCNC: 8.5 MG/DL (ref 8.5–10.5)
CALCIUM SERPL-MCNC: 8.6 MG/DL (ref 8.5–10.5)
CALCIUM SERPL-MCNC: 8.6 MG/DL (ref 8.5–10.5)
CALCIUM SERPL-MCNC: 8.7 MG/DL (ref 8.5–10.5)
CALCIUM SERPL-MCNC: 8.8 MG/DL (ref 8.5–10.5)
CALCIUM SERPL-MCNC: 8.9 MG/DL (ref 8.5–10.5)
CALCIUM SERPL-MCNC: 9 MG/DL (ref 8.5–10.5)
CALCIUM SERPL-MCNC: 9.1 MG/DL (ref 8.5–10.5)
CALCIUM SERPL-MCNC: 9.2 MG/DL (ref 8.5–10.5)
CALCIUM SERPL-MCNC: 9.2 MG/DL (ref 8.5–10.5)
CALCIUM SERPL-MCNC: 9.3 MG/DL (ref 8.5–10.5)
CALCIUM SERPL-MCNC: 9.3 MG/DL (ref 8.5–10.5)
CALCIUM SERPL-MCNC: 9.8 MG/DL (ref 8.5–10.5)
CAMPYLOBACTER PCR: NOT DETECTED
CHARACTER, URINE: CLEAR
CHLORIDE BLD-SCNC: 100 MEQ/L (ref 98–111)
CHLORIDE BLD-SCNC: 100 MEQ/L (ref 98–111)
CHLORIDE BLD-SCNC: 101 MEQ/L (ref 98–111)
CHLORIDE BLD-SCNC: 101 MMOL/L (ref 98–109)
CHLORIDE BLD-SCNC: 102 MEQ/L (ref 95–107)
CHLORIDE BLD-SCNC: 102 MEQ/L (ref 98–111)
CHLORIDE BLD-SCNC: 103 MEQ/L (ref 98–111)
CHLORIDE BLD-SCNC: 103 MEQ/L (ref 98–111)
CHLORIDE BLD-SCNC: 104 MEQ/L (ref 98–111)
CHLORIDE BLD-SCNC: 105 MEQ/L (ref 98–111)
CHLORIDE BLD-SCNC: 105 MEQ/L (ref 98–111)
CHLORIDE BLD-SCNC: 106 MEQ/L (ref 98–111)
CHLORIDE BLD-SCNC: 107 MEQ/L (ref 98–111)
CHLORIDE BLD-SCNC: 107 MEQ/L (ref 98–111)
CHLORIDE BLD-SCNC: 108 MEQ/L (ref 98–111)
CHLORIDE BLD-SCNC: 108 MMOL/L (ref 98–109)
CHLORIDE BLD-SCNC: 99 MEQ/L (ref 98–111)
CLOSTRIDIUM DIFFICILE, PCR: NOT DETECTED
CO2: 16 MEQ/L (ref 23–33)
CO2: 16 MEQ/L (ref 23–33)
CO2: 20 MEQ/L (ref 23–33)
CO2: 21 MEQ/L (ref 23–33)
CO2: 21 MEQ/L (ref 23–33)
CO2: 22 MEQ/L (ref 23–33)
CO2: 23 MEQ/L (ref 23–33)
CO2: 23 MEQ/L (ref 23–33)
CO2: 23 MMOL/L (ref 22–32)
CO2: 24 MEQ/L (ref 23–33)
CO2: 24 MEQ/L (ref 23–33)
CO2: 24 MMOL/L (ref 22–32)
CO2: 26 MEQ/L (ref 19–31)
CO2: 26 MEQ/L (ref 23–33)
CO2: 26 MMOL/L (ref 22–32)
CO2: 27 MMOL/L (ref 22–32)
COLLECTED BY:: 4648
COLLECTED BY:: ABNORMAL
COLOR: YELLOW
CREAT SERPL-MCNC: 2.2 MG/DL (ref 0.4–1.2)
CREAT SERPL-MCNC: 2.2 MG/DL (ref 0.4–1.2)
CREAT SERPL-MCNC: 2.2 MG/DL (ref 0.6–1.3)
CREAT SERPL-MCNC: 2.3 MG/DL (ref 0.4–1.2)
CREAT SERPL-MCNC: 2.3 MG/DL (ref 0.4–1.2)
CREAT SERPL-MCNC: 2.4 MG/DL (ref 0.4–1.2)
CREAT SERPL-MCNC: 2.6 MG/DL (ref 0.4–1.2)
CREAT SERPL-MCNC: 2.6 MG/DL (ref 0.4–1.2)
CREAT SERPL-MCNC: 2.76 MG/DL (ref 0.4–1)
CREAT SERPL-MCNC: 3 MG/DL (ref 0.4–1.2)
CREAT SERPL-MCNC: 3 MG/DL (ref 0.4–1.2)
CREAT SERPL-MCNC: 3.2 MG/DL (ref 0.4–1.2)
CREAT SERPL-MCNC: 3.2 MG/DL (ref 0.4–1.2)
CREAT SERPL-MCNC: 3.3 MG/DL (ref 0.4–1.2)
CREAT SERPL-MCNC: 3.38 MG/DL (ref 0.4–1)
CREAT SERPL-MCNC: 3.39 MG/DL (ref 0.4–1)
CREAT SERPL-MCNC: 3.4 MG/DL (ref 0.4–1.2)
CREAT SERPL-MCNC: 3.4 MG/DL (ref 0.4–1.2)
CREAT SERPL-MCNC: 3.5 MG/DL (ref 0.4–1.2)
CREAT SERPL-MCNC: 3.69 MG/DL (ref 0.4–1)
CRYPTOSPORIDIUM PCR: NOT DETECTED
CRYSTALS, FLUID: NORMAL
CYCLOSPORA CAYETANENSIS PCR: NOT DETECTED
DEVICE: ABNORMAL
DIFFERENTIAL TYPE: ABNORMAL
E COLI 0157 PCR: NORMAL
E COLI ENTEROAGGREGATIVE PCR: NOT DETECTED
E COLI ENTEROPATHOGENIC PCR: NOT DETECTED
E COLI ENTEROTOXIGENIC PCR: NOT DETECTED
E COLI SHIGA LIKE TOXIN PCR: NOT DETECTED
E COLI SHIGELLA/ENTEROINVASIVE PCR: NOT DETECTED
E HISTOLYTICA GI FILM ARRAY: NOT DETECTED
EGFR AFRICAN AMERICAN: 14 ML/MIN/1.73SQ.M
EGFR AFRICAN AMERICAN: 15 ML/MIN/1.73SQ.M
EGFR AFRICAN AMERICAN: 16 ML/MIN/1.73SQ.M
EGFR AFRICAN AMERICAN: 20 ML/MIN/1.73SQ.M
EGFR AFRICAN AMERICAN: 22.5 ML/MIN/1.73 M2
EGFR IF NONAFRICAN AMERICAN: 12 ML/MIN/1.73SQ.M
EGFR IF NONAFRICAN AMERICAN: 13 ML/MIN/1.73SQ.M
EGFR IF NONAFRICAN AMERICAN: 13 ML/MIN/1.73SQ.M
EGFR IF NONAFRICAN AMERICAN: 16 ML/MIN/1.73SQ.M
EGFR IF NONAFRICAN AMERICAN: 19.4 ML/MIN/1.73 M2
EKG ATRIAL RATE: 52 BPM
EKG ATRIAL RATE: 57 BPM
EKG ATRIAL RATE: 59 BPM
EKG ATRIAL RATE: 60 BPM
EKG ATRIAL RATE: 70 BPM
EKG ATRIAL RATE: 73 BPM
EKG P AXIS: 66 DEGREES
EKG P AXIS: 80 DEGREES
EKG P AXIS: 84 DEGREES
EKG P AXIS: 84 DEGREES
EKG P-R INTERVAL: 304 MS
EKG P-R INTERVAL: 314 MS
EKG P-R INTERVAL: 320 MS
EKG P-R INTERVAL: 356 MS
EKG Q-T INTERVAL: 402 MS
EKG Q-T INTERVAL: 418 MS
EKG Q-T INTERVAL: 430 MS
EKG Q-T INTERVAL: 434 MS
EKG Q-T INTERVAL: 468 MS
EKG Q-T INTERVAL: 482 MS
EKG QRS DURATION: 102 MS
EKG QRS DURATION: 110 MS
EKG QRS DURATION: 114 MS
EKG QRS DURATION: 124 MS
EKG QRS DURATION: 136 MS
EKG QRS DURATION: 138 MS
EKG QTC CALCULATION (BAZETT): 402 MS
EKG QTC CALCULATION (BAZETT): 429 MS
EKG QTC CALCULATION (BAZETT): 432 MS
EKG QTC CALCULATION (BAZETT): 448 MS
EKG QTC CALCULATION (BAZETT): 451 MS
EKG QTC CALCULATION (BAZETT): 455 MS
EKG R AXIS: -57 DEGREES
EKG R AXIS: -60 DEGREES
EKG R AXIS: -61 DEGREES
EKG R AXIS: -62 DEGREES
EKG R AXIS: -63 DEGREES
EKG R AXIS: -66 DEGREES
EKG T AXIS: 68 DEGREES
EKG T AXIS: 76 DEGREES
EKG T AXIS: 77 DEGREES
EKG T AXIS: 82 DEGREES
EKG T AXIS: 84 DEGREES
EKG T AXIS: 96 DEGREES
EKG VENTRICULAR RATE: 52 BPM
EKG VENTRICULAR RATE: 57 BPM
EKG VENTRICULAR RATE: 59 BPM
EKG VENTRICULAR RATE: 60 BPM
EKG VENTRICULAR RATE: 61 BPM
EKG VENTRICULAR RATE: 70 BPM
EOSINOPHIL # BLD: 0.5 %
EOSINOPHIL # BLD: 0.8 %
EOSINOPHIL # BLD: 3.5 %
EOSINOPHIL # BLD: 4 %
EOSINOPHIL SMEAR: NORMAL
EOSINOPHILS ABSOLUTE: 0.1 THOU/MM3 (ref 0–0.4)
EOSINOPHILS ABSOLUTE: 0.1 THOU/MM3 (ref 0–0.4)
EOSINOPHILS ABSOLUTE: 0.2 THOU/MM3 (ref 0–0.4)
EOSINOPHILS ABSOLUTE: 0.2 THOU/MM3 (ref 0–0.4)
ERYTHROCYTE [DISTWIDTH] IN BLOOD BY AUTOMATED COUNT: 15.1 % (ref 11.5–14.5)
ERYTHROCYTE [DISTWIDTH] IN BLOOD BY AUTOMATED COUNT: 15.4 % (ref 11.5–14.5)
ERYTHROCYTE [DISTWIDTH] IN BLOOD BY AUTOMATED COUNT: 15.4 % (ref 11.5–14.5)
ERYTHROCYTE [DISTWIDTH] IN BLOOD BY AUTOMATED COUNT: 15.5 % (ref 11.5–14.5)
ERYTHROCYTE [DISTWIDTH] IN BLOOD BY AUTOMATED COUNT: 15.6 % (ref 11.5–14.5)
ERYTHROCYTE [DISTWIDTH] IN BLOOD BY AUTOMATED COUNT: 15.8 % (ref 11.5–14.5)
ERYTHROCYTE [DISTWIDTH] IN BLOOD BY AUTOMATED COUNT: 15.8 % (ref 11.5–14.5)
ERYTHROCYTE [DISTWIDTH] IN BLOOD BY AUTOMATED COUNT: 16.1 % (ref 11.5–14.5)
ERYTHROCYTE [DISTWIDTH] IN BLOOD BY AUTOMATED COUNT: 16.2 % (ref 11.5–14.5)
ERYTHROCYTE [DISTWIDTH] IN BLOOD BY AUTOMATED COUNT: 16.2 % (ref 11.5–14.5)
ERYTHROCYTE [DISTWIDTH] IN BLOOD BY AUTOMATED COUNT: 16.4 % (ref 11.5–14.5)
ERYTHROCYTE [DISTWIDTH] IN BLOOD BY AUTOMATED COUNT: 16.5 % (ref 11.5–14.5)
ERYTHROCYTE [DISTWIDTH] IN BLOOD BY AUTOMATED COUNT: 17.1 % (ref 11.5–14.5)
ERYTHROCYTE [DISTWIDTH] IN BLOOD BY AUTOMATED COUNT: 17.2 % (ref 11.5–14.5)
ERYTHROCYTE [DISTWIDTH] IN BLOOD BY AUTOMATED COUNT: 17.4 % (ref 11.5–14.5)
ERYTHROCYTE [DISTWIDTH] IN BLOOD BY AUTOMATED COUNT: 18.2 % (ref 11.5–14.5)
ERYTHROCYTE [DISTWIDTH] IN BLOOD BY AUTOMATED COUNT: 54.3 FL (ref 35–45)
ERYTHROCYTE [DISTWIDTH] IN BLOOD BY AUTOMATED COUNT: 57.4 FL (ref 35–45)
ERYTHROCYTE [DISTWIDTH] IN BLOOD BY AUTOMATED COUNT: 58 FL (ref 35–45)
ERYTHROCYTE [DISTWIDTH] IN BLOOD BY AUTOMATED COUNT: 59.3 FL (ref 35–45)
ERYTHROCYTE [DISTWIDTH] IN BLOOD BY AUTOMATED COUNT: 59.8 FL (ref 35–45)
ERYTHROCYTE [DISTWIDTH] IN BLOOD BY AUTOMATED COUNT: 60.3 FL (ref 35–45)
ERYTHROCYTE [DISTWIDTH] IN BLOOD BY AUTOMATED COUNT: 61.1 FL (ref 35–45)
ERYTHROCYTE [DISTWIDTH] IN BLOOD BY AUTOMATED COUNT: 61.1 FL (ref 35–45)
ERYTHROCYTE [DISTWIDTH] IN BLOOD BY AUTOMATED COUNT: 61.2 FL (ref 35–45)
ERYTHROCYTE [DISTWIDTH] IN BLOOD BY AUTOMATED COUNT: 61.5 FL (ref 35–45)
ERYTHROCYTE [DISTWIDTH] IN BLOOD BY AUTOMATED COUNT: 61.7 FL (ref 35–45)
ERYTHROCYTE [DISTWIDTH] IN BLOOD BY AUTOMATED COUNT: 62.2 FL (ref 35–45)
ERYTHROCYTE [DISTWIDTH] IN BLOOD BY AUTOMATED COUNT: 62.7 FL (ref 35–45)
ERYTHROCYTE [DISTWIDTH] IN BLOOD BY AUTOMATED COUNT: 64.2 FL (ref 35–45)
ERYTHROCYTE [DISTWIDTH] IN BLOOD BY AUTOMATED COUNT: 64.3 FL (ref 35–45)
ERYTHROCYTE [DISTWIDTH] IN BLOOD BY AUTOMATED COUNT: 65.1 FL (ref 35–45)
ERYTHROCYTE [DISTWIDTH] IN BLOOD BY AUTOMATED COUNT: 66.8 FL (ref 35–45)
ERYTHROCYTE [DISTWIDTH] IN BLOOD BY AUTOMATED COUNT: 67.9 FL (ref 35–45)
FERRITIN: 45 NG/ML (ref 11–307)
FERRITIN: 74.26 NG/ML (ref 13–200)
FOLATE: 11.9 NG/ML (ref 4.8–24.2)
FUNGUS SMEAR: ABNORMAL
FUNGUS SMEAR: ABNORMAL
GFR SERPL CREATININE-BSD FRML MDRD: 12 ML/MIN/1.73M2
GFR SERPL CREATININE-BSD FRML MDRD: 13 ML/MIN/1.73M2
GFR SERPL CREATININE-BSD FRML MDRD: 14 ML/MIN/1.73M2
GFR SERPL CREATININE-BSD FRML MDRD: 14 ML/MIN/1.73M2
GFR SERPL CREATININE-BSD FRML MDRD: 15 ML/MIN/1.73M2
GFR SERPL CREATININE-BSD FRML MDRD: 15 ML/MIN/1.73M2
GFR SERPL CREATININE-BSD FRML MDRD: 17 ML/MIN/1.73M2
GFR SERPL CREATININE-BSD FRML MDRD: 17 ML/MIN/1.73M2
GFR SERPL CREATININE-BSD FRML MDRD: 19 ML/MIN/1.73M2
GFR SERPL CREATININE-BSD FRML MDRD: 20 ML/MIN/1.73M2
GFR SERPL CREATININE-BSD FRML MDRD: 20 ML/MIN/1.73M2
GFR SERPL CREATININE-BSD FRML MDRD: 21 ML/MIN/1.73M2
GFR SERPL CREATININE-BSD FRML MDRD: 21 ML/MIN/1.73M2
GIARDIA LAMBLIA PCR: NOT DETECTED
GLUCOSE BLD-MCNC: 102 MG/DL (ref 70–108)
GLUCOSE BLD-MCNC: 108 MG/DL (ref 70–108)
GLUCOSE BLD-MCNC: 113 MG/DL (ref 70–108)
GLUCOSE BLD-MCNC: 114 MG/DL (ref 70–108)
GLUCOSE BLD-MCNC: 114 MG/DL (ref 70–108)
GLUCOSE BLD-MCNC: 120 MG/DL (ref 70–108)
GLUCOSE BLD-MCNC: 122 MG/DL (ref 70–108)
GLUCOSE BLD-MCNC: 127 MG/DL (ref 70–108)
GLUCOSE BLD-MCNC: 131 MG/DL (ref 70–108)
GLUCOSE BLD-MCNC: 134 MG/DL (ref 70–108)
GLUCOSE BLD-MCNC: 134 MG/DL (ref 70–108)
GLUCOSE BLD-MCNC: 141 MG/DL (ref 70–108)
GLUCOSE BLD-MCNC: 142 MG/DL (ref 70–108)
GLUCOSE BLD-MCNC: 144 MG/DL (ref 70–108)
GLUCOSE BLD-MCNC: 144 MG/DL (ref 70–108)
GLUCOSE BLD-MCNC: 147 MG/DL (ref 70–108)
GLUCOSE BLD-MCNC: 147 MG/DL (ref 70–108)
GLUCOSE BLD-MCNC: 150 MG/DL (ref 70–108)
GLUCOSE BLD-MCNC: 151 MG/DL (ref 70–108)
GLUCOSE BLD-MCNC: 152 MG/DL (ref 70–108)
GLUCOSE BLD-MCNC: 152 MG/DL (ref 70–108)
GLUCOSE BLD-MCNC: 155 MG/DL (ref 70–108)
GLUCOSE BLD-MCNC: 159 MG/DL (ref 70–108)
GLUCOSE BLD-MCNC: 160 MG/DL (ref 70–108)
GLUCOSE BLD-MCNC: 162 MG/DL (ref 70–108)
GLUCOSE BLD-MCNC: 163 MG/DL (ref 70–108)
GLUCOSE BLD-MCNC: 165 MG/DL (ref 70–108)
GLUCOSE BLD-MCNC: 166 MG/DL (ref 70–108)
GLUCOSE BLD-MCNC: 168 MG/DL (ref 70–108)
GLUCOSE BLD-MCNC: 168 MG/DL (ref 70–108)
GLUCOSE BLD-MCNC: 172 MG/DL (ref 70–108)
GLUCOSE BLD-MCNC: 173 MG/DL (ref 70–108)
GLUCOSE BLD-MCNC: 175 MG/DL (ref 70–108)
GLUCOSE BLD-MCNC: 181 MG/DL (ref 70–108)
GLUCOSE BLD-MCNC: 181 MG/DL (ref 70–108)
GLUCOSE BLD-MCNC: 184 MG/DL (ref 70–108)
GLUCOSE BLD-MCNC: 188 MG/DL (ref 70–108)
GLUCOSE BLD-MCNC: 188 MG/DL (ref 70–108)
GLUCOSE BLD-MCNC: 193 MG/DL (ref 70–108)
GLUCOSE BLD-MCNC: 193 MG/DL (ref 70–108)
GLUCOSE BLD-MCNC: 194 MG/DL (ref 70–108)
GLUCOSE BLD-MCNC: 195 MG/DL (ref 70–108)
GLUCOSE BLD-MCNC: 195 MG/DL (ref 70–108)
GLUCOSE BLD-MCNC: 198 MG/DL (ref 70–108)
GLUCOSE BLD-MCNC: 203 MG/DL (ref 70–108)
GLUCOSE BLD-MCNC: 204 MG/DL (ref 70–108)
GLUCOSE BLD-MCNC: 204 MG/DL (ref 70–108)
GLUCOSE BLD-MCNC: 205 MG/DL (ref 70–108)
GLUCOSE BLD-MCNC: 205 MG/DL (ref 70–108)
GLUCOSE BLD-MCNC: 207 MG/DL (ref 70–108)
GLUCOSE BLD-MCNC: 208 MG/DL (ref 70–108)
GLUCOSE BLD-MCNC: 211 MG/DL (ref 70–108)
GLUCOSE BLD-MCNC: 214 MG/DL (ref 70–108)
GLUCOSE BLD-MCNC: 215 MG/DL (ref 70–108)
GLUCOSE BLD-MCNC: 219 MG/DL (ref 70–108)
GLUCOSE BLD-MCNC: 220 MG/DL (ref 70–108)
GLUCOSE BLD-MCNC: 223 MG/DL (ref 70–108)
GLUCOSE BLD-MCNC: 223 MG/DL (ref 70–108)
GLUCOSE BLD-MCNC: 225 MG/DL (ref 70–108)
GLUCOSE BLD-MCNC: 229 MG/DL (ref 70–108)
GLUCOSE BLD-MCNC: 230 MG/DL (ref 70–108)
GLUCOSE BLD-MCNC: 230 MG/DL (ref 70–108)
GLUCOSE BLD-MCNC: 231 MG/DL (ref 70–108)
GLUCOSE BLD-MCNC: 232 MG/DL (ref 70–108)
GLUCOSE BLD-MCNC: 233 MG/DL (ref 70–108)
GLUCOSE BLD-MCNC: 234 MG/DL (ref 70–108)
GLUCOSE BLD-MCNC: 235 MG/DL (ref 70–108)
GLUCOSE BLD-MCNC: 237 MG/DL (ref 70–108)
GLUCOSE BLD-MCNC: 237 MG/DL (ref 70–108)
GLUCOSE BLD-MCNC: 238 MG/DL (ref 70–108)
GLUCOSE BLD-MCNC: 239 MG/DL (ref 70–108)
GLUCOSE BLD-MCNC: 242 MG/DL (ref 70–108)
GLUCOSE BLD-MCNC: 245 MG/DL (ref 70–108)
GLUCOSE BLD-MCNC: 245 MG/DL (ref 70–108)
GLUCOSE BLD-MCNC: 246 MG/DL (ref 70–108)
GLUCOSE BLD-MCNC: 248 MG/DL (ref 70–108)
GLUCOSE BLD-MCNC: 254 MG/DL (ref 70–108)
GLUCOSE BLD-MCNC: 257 MG/DL (ref 70–108)
GLUCOSE BLD-MCNC: 261 MG/DL (ref 70–108)
GLUCOSE BLD-MCNC: 262 MG/DL (ref 70–108)
GLUCOSE BLD-MCNC: 263 MG/DL (ref 70–108)
GLUCOSE BLD-MCNC: 267 MG/DL (ref 70–108)
GLUCOSE BLD-MCNC: 273 MG/DL (ref 70–108)
GLUCOSE BLD-MCNC: 274 MG/DL (ref 70–108)
GLUCOSE BLD-MCNC: 282 MG/DL (ref 70–108)
GLUCOSE BLD-MCNC: 284 MG/DL (ref 70–108)
GLUCOSE BLD-MCNC: 291 MG/DL (ref 70–108)
GLUCOSE BLD-MCNC: 60 MG/DL (ref 70–108)
GLUCOSE BLD-MCNC: 64 MG/DL (ref 70–108)
GLUCOSE BLD-MCNC: 69 MG/DL (ref 70–108)
GLUCOSE BLD-MCNC: 76 MG/DL (ref 70–108)
GLUCOSE BLD-MCNC: 76 MG/DL (ref 70–108)
GLUCOSE BLD-MCNC: 80 MG/DL (ref 70–108)
GLUCOSE BLD-MCNC: 83 MG/DL (ref 70–108)
GLUCOSE BLD-MCNC: 83 MG/DL (ref 70–108)
GLUCOSE BLD-MCNC: 85 MG/DL (ref 70–108)
GLUCOSE BLD-MCNC: 85 MG/DL (ref 70–108)
GLUCOSE BLD-MCNC: 86 MG/DL (ref 70–108)
GLUCOSE BLD-MCNC: 97 MG/DL (ref 70–108)
GLUCOSE URINE: NEGATIVE MG/DL
GLUCOSE, WHOLE BLOOD: 115 MG/DL (ref 70–108)
GLUCOSE: 138 MG/DL (ref 65–99)
GLUCOSE: 141 MG/DL (ref 65–99)
GLUCOSE: 195 MG/DL (ref 70–99)
GLUCOSE: 215 MG/DL (ref 65–99)
GLUCOSE: 236 MG/DL (ref 65–99)
GRAM STAIN RESULT: ABNORMAL
GRAM STAIN RESULT: ABNORMAL
HBA1C MFR BLD: 5.3 % (ref 4.4–6.4)
HCO3: 24 MMOL/L (ref 23–28)
HCO3: 24 MMOL/L (ref 23–28)
HCO3: 25 MMOL/L (ref 23–28)
HCO3: 26 MMOL/L (ref 23–28)
HCO3: 27 MMOL/L (ref 23–28)
HCT VFR BLD CALC: 21.5 % (ref 37–47)
HCT VFR BLD CALC: 22.1 % (ref 37–47)
HCT VFR BLD CALC: 22.4 % (ref 37–47)
HCT VFR BLD CALC: 22.4 % (ref 37–47)
HCT VFR BLD CALC: 22.7 % (ref 37–47)
HCT VFR BLD CALC: 23.9 % (ref 37–47)
HCT VFR BLD CALC: 25.5 % (ref 37–47)
HCT VFR BLD CALC: 25.5 % (ref 37–47)
HCT VFR BLD CALC: 26.5 % (ref 37–47)
HCT VFR BLD CALC: 26.5 % (ref 37–47)
HCT VFR BLD CALC: 26.7 % (ref 37–47)
HCT VFR BLD CALC: 27.2 % (ref 37–47)
HCT VFR BLD CALC: 27.8 % (ref 36–48)
HCT VFR BLD CALC: 27.9 % (ref 37–47)
HCT VFR BLD CALC: 27.9 % (ref 37–47)
HCT VFR BLD CALC: 28.7 % (ref 37–47)
HCT VFR BLD CALC: 29 % (ref 37–47)
HCT VFR BLD CALC: 29.8 % (ref 36–48)
HCT VFR BLD CALC: 31 % (ref 36–46)
HEMOGLOBIN: 10.3 G/DL (ref 12–16)
HEMOGLOBIN: 10.6 G/DL (ref 12–16)
HEMOGLOBIN: 6.8 GM/DL (ref 12–16)
HEMOGLOBIN: 7.2 GM/DL (ref 12–16)
HEMOGLOBIN: 7.2 GM/DL (ref 12–16)
HEMOGLOBIN: 7.3 GM/DL (ref 12–16)
HEMOGLOBIN: 7.4 GM/DL (ref 12–16)
HEMOGLOBIN: 8 GM/DL (ref 12–16)
HEMOGLOBIN: 8.2 GM/DL (ref 12–16)
HEMOGLOBIN: 8.2 GM/DL (ref 12–16)
HEMOGLOBIN: 8.3 GM/DL (ref 12–16)
HEMOGLOBIN: 8.4 GM/DL (ref 12–16)
HEMOGLOBIN: 8.4 GM/DL (ref 12–16)
HEMOGLOBIN: 8.6 GM/DL (ref 12–16)
HEMOGLOBIN: 8.7 GM/DL (ref 12–16)
HEMOGLOBIN: 8.8 GM/DL (ref 12–16)
HEMOGLOBIN: 9.1 GM/DL (ref 12–16)
HEMOGLOBIN: 9.2 GM/DL (ref 12–16)
HEMOGLOBIN: 9.4 G/DL (ref 12–16)
IFIO2: 100
IFIO2: 45
IFIO2: 50
IFIO2: 6
IFIO2: 65
IMMATURE GRANS (ABS): 0.02 THOU/MM3 (ref 0–0.07)
IMMATURE GRANS (ABS): 0.03 THOU/MM3 (ref 0–0.07)
IMMATURE GRANS (ABS): 0.23 THOU/MM3 (ref 0–0.07)
IMMATURE GRANS (ABS): 0.33 THOU/MM3 (ref 0–0.07)
IMMATURE GRANULOCYTES: 0.4 %
IMMATURE GRANULOCYTES: 0.6 %
IMMATURE GRANULOCYTES: 2 %
IMMATURE GRANULOCYTES: 2.9 %
INR BLD: 0.89 (ref 0.85–1.13)
INR BLD: 0.92 (ref 0.85–1.13)
IRON SATURATION: 24 % SATURATION (ref 15–50)
IRON SATURATION: 31 % (ref 20–50)
IRON, SERUM: 87 UG/DL (ref 37–145)
IRON, SERUM: 88 UG/DL (ref 50–170)
KETONES, URINE: NEGATIVE
LACTIC ACID: 1 MMOL/L (ref 0.5–2.2)
LD: 222 U/L (ref 100–190)
LEUKOCYTE ESTERASE, URINE: NEGATIVE
LIPASE: 43.6 U/L (ref 5.6–51.3)
LYMPHOCYTES # BLD: 10.8 %
LYMPHOCYTES # BLD: 13.8 %
LYMPHOCYTES # BLD: 3.5 %
LYMPHOCYTES # BLD: 3.9 %
LYMPHOCYTES ABSOLUTE: 0.4 THOU/MM3 (ref 1–4.8)
LYMPHOCYTES ABSOLUTE: 0.5 THOU/MM3 (ref 1–4.8)
LYMPHOCYTES ABSOLUTE: 0.6 THOU/MM3 (ref 1–4.8)
LYMPHOCYTES ABSOLUTE: 0.7 THOU/MM3 (ref 1–4.8)
LYMPHOCYTES, BAL: 1 % (ref 10–15)
MACROPHAGE/MONOCYTE BAL: 1 % (ref 86–100)
MAGNESIUM: 1.5 MG/DL (ref 1.6–2.4)
MAGNESIUM: 1.7 MG/DL (ref 1.6–2.4)
MAGNESIUM: 1.9 MG/DL (ref 1.6–2.4)
MAGNESIUM: 2.1 MG/DL (ref 1.6–2.4)
MAGNESIUM: 2.2 MG/DL (ref 1.6–2.4)
MCH RBC QN AUTO: 32.8 PG (ref 26–33)
MCH RBC QN AUTO: 32.9 PG (ref 26–33)
MCH RBC QN AUTO: 33.5 PG (ref 26–33)
MCH RBC QN AUTO: 33.6 PG (ref 26–33)
MCH RBC QN AUTO: 33.7 PG (ref 26–33)
MCH RBC QN AUTO: 33.7 PG (ref 26–33)
MCH RBC QN AUTO: 33.9 PG (ref 26–33)
MCH RBC QN AUTO: 34 PG (ref 26–33)
MCH RBC QN AUTO: 34 PG (ref 26–33)
MCH RBC QN AUTO: 34.1 PG (ref 26–33)
MCH RBC QN AUTO: 34.2 PG (ref 26–33)
MCH RBC QN AUTO: 34.2 PG (ref 26–33)
MCH RBC QN AUTO: 34.3 PG (ref 26–33)
MCH RBC QN AUTO: 34.4 PG (ref 26–33)
MCH RBC QN AUTO: 34.4 PG (ref 26–33)
MCH RBC QN AUTO: 34.6 PG (ref 26–33)
MCHC RBC AUTO-ENTMCNC: 31 GM/DL (ref 32.2–35.5)
MCHC RBC AUTO-ENTMCNC: 31.1 GM/DL (ref 32.2–35.5)
MCHC RBC AUTO-ENTMCNC: 31.4 GM/DL (ref 32.2–35.5)
MCHC RBC AUTO-ENTMCNC: 31.6 GM/DL (ref 32.2–35.5)
MCHC RBC AUTO-ENTMCNC: 31.6 GM/DL (ref 32.2–35.5)
MCHC RBC AUTO-ENTMCNC: 31.7 GM/DL (ref 32.2–35.5)
MCHC RBC AUTO-ENTMCNC: 32 GM/DL (ref 32.2–35.5)
MCHC RBC AUTO-ENTMCNC: 32.1 GM/DL (ref 32.2–35.5)
MCHC RBC AUTO-ENTMCNC: 32.1 GM/DL (ref 32.2–35.5)
MCHC RBC AUTO-ENTMCNC: 32.2 GM/DL (ref 32.2–35.5)
MCHC RBC AUTO-ENTMCNC: 32.2 GM/DL (ref 32.2–35.5)
MCHC RBC AUTO-ENTMCNC: 32.4 GM/DL (ref 32.2–35.5)
MCHC RBC AUTO-ENTMCNC: 32.6 GM/DL (ref 32.2–35.5)
MCHC RBC AUTO-ENTMCNC: 33 GM/DL (ref 32.2–35.5)
MCHC RBC AUTO-ENTMCNC: 33 GM/DL (ref 32.2–35.5)
MCHC RBC AUTO-ENTMCNC: 35.2 GM/DL (ref 32.2–35.5)
MCV RBC AUTO: 103 FL (ref 81–99)
MCV RBC AUTO: 103.8 FL (ref 81–99)
MCV RBC AUTO: 104.1 FL (ref 81–99)
MCV RBC AUTO: 104.2 FL (ref 81–99)
MCV RBC AUTO: 104.7 FL (ref 81–99)
MCV RBC AUTO: 105 FL (ref 81–99)
MCV RBC AUTO: 105.3 FL (ref 81–99)
MCV RBC AUTO: 105.8 FL (ref 81–99)
MCV RBC AUTO: 105.9 FL (ref 81–99)
MCV RBC AUTO: 106.2 FL (ref 81–99)
MCV RBC AUTO: 106.2 FL (ref 81–99)
MCV RBC AUTO: 107.2 FL (ref 81–99)
MCV RBC AUTO: 107.3 FL (ref 81–99)
MCV RBC AUTO: 107.4 FL (ref 81–99)
MCV RBC AUTO: 108 FL (ref 81–99)
MCV RBC AUTO: 108.1 FL (ref 81–99)
MCV RBC AUTO: 108.6 FL (ref 81–99)
MCV RBC AUTO: 97 FL (ref 81–99)
METHYLMALONIC ACID: NORMAL
MISC REFERENCE: NORMAL
MISC. #1 REFERENCE GROUP TEST: NORMAL
MONOCYTES # BLD: 11.3 %
MONOCYTES # BLD: 4.5 %
MONOCYTES # BLD: 4.5 %
MONOCYTES # BLD: 9.4 %
MONOCYTES ABSOLUTE: 0.5 THOU/MM3 (ref 0.4–1.3)
MONOCYTES ABSOLUTE: 0.6 THOU/MM3 (ref 0.4–1.3)
MRSA SCREEN RT-PCR: NEGATIVE
MRSA SCREEN: NORMAL
NITRITE, URINE: NEGATIVE
NOROVIRUS GI GII PCR: NOT DETECTED
NUCLEATED RED BLOOD CELLS: 0 /100 WBC
O2 SATURATION: 88 %
O2 SATURATION: 88 %
O2 SATURATION: 89 %
O2 SATURATION: 90 %
O2 SATURATION: 97 %
ORGANISM: ABNORMAL
OSMOLALITY CALCULATION: 298.6 MOSMOL/KG (ref 275–300)
OSMOLALITY CALCULATION: 300.5 MOSMOL/KG (ref 275–300)
PARATHYROID HORMONE INTACT: 43 PG/ML (ref 15–65)
PATHOLOGIST REVIEW: ABNORMAL
PATHOLOGIST REVIEW: ABNORMAL
PCO2: 45 MMHG (ref 35–45)
PCO2: 45 MMHG (ref 35–45)
PCO2: 46 MMHG (ref 35–45)
PCO2: 46 MMHG (ref 35–45)
PCO2: 53 MMHG (ref 35–45)
PH BLOOD GAS: 7.28 (ref 7.35–7.45)
PH BLOOD GAS: 7.32 (ref 7.35–7.45)
PH BLOOD GAS: 7.33 (ref 7.35–7.45)
PH BLOOD GAS: 7.35 (ref 7.35–7.45)
PH BLOOD GAS: 7.39 (ref 7.35–7.45)
PH UA: 5 (ref 5–9)
PHOSPHORUS: 5.1 MG/DL (ref 2.4–4.9)
PLATELET # BLD: 112 THOU/MM3 (ref 130–400)
PLATELET # BLD: 146 THOU/MM3 (ref 130–400)
PLATELET # BLD: 154 THOU/MM3 (ref 130–400)
PLATELET # BLD: 156 THOU/MM3 (ref 130–400)
PLATELET # BLD: 157 THOU/MM3 (ref 130–400)
PLATELET # BLD: 158 THOU/MM3 (ref 130–400)
PLATELET # BLD: 159 THOU/MM3 (ref 130–400)
PLATELET # BLD: 163 THOU/MM3 (ref 130–400)
PLATELET # BLD: 164 THOU/MM3 (ref 130–400)
PLATELET # BLD: 172 THOU/MM3 (ref 130–400)
PLATELET # BLD: 178 THOU/MM3 (ref 130–400)
PLATELET # BLD: 189 THOU/MM3 (ref 130–400)
PLATELET # BLD: 190 THOU/MM3 (ref 130–400)
PLATELET # BLD: 192 THOU/MM3 (ref 130–400)
PLATELET # BLD: 194 THOU/MM3 (ref 130–400)
PLATELET # BLD: 194 THOU/MM3 (ref 130–400)
PLATELET # BLD: 201 THOU/MM3 (ref 130–400)
PLATELET # BLD: 204 THOU/MM3 (ref 130–400)
PLATELET # BLD: 219 THOU/MM3 (ref 130–400)
PLESIOMONAS SHIGELLOIDES PCR: NOT DETECTED
PMV BLD AUTO: 11.3 FL (ref 9.4–12.4)
PMV BLD AUTO: 11.4 FL (ref 9.4–12.4)
PMV BLD AUTO: 11.4 FL (ref 9.4–12.4)
PMV BLD AUTO: 11.5 FL (ref 9.4–12.4)
PMV BLD AUTO: 11.5 FL (ref 9.4–12.4)
PMV BLD AUTO: 11.7 FL (ref 9.4–12.4)
PMV BLD AUTO: 12 FL (ref 9.4–12.4)
PMV BLD AUTO: 12.1 FL (ref 9.4–12.4)
PMV BLD AUTO: 12.3 FL (ref 9.4–12.4)
PMV BLD AUTO: 12.4 FL (ref 9.4–12.4)
PMV BLD AUTO: 12.6 FL (ref 9.4–12.4)
PMV BLD AUTO: 12.7 FL (ref 9.4–12.4)
PO2: 108 MMHG (ref 71–104)
PO2: 56 MMHG (ref 71–104)
PO2: 58 MMHG (ref 71–104)
PO2: 60 MMHG (ref 71–104)
PO2: 65 MMHG (ref 71–104)
POTASSIUM REFLEX MAGNESIUM: 3.8 MEQ/L (ref 3.5–5.2)
POTASSIUM SERPL-SCNC: 3.2 MEQ/L (ref 3.5–5.2)
POTASSIUM SERPL-SCNC: 3.5 MEQ/L (ref 3.5–5.2)
POTASSIUM SERPL-SCNC: 3.6 MEQ/L (ref 3.5–5.2)
POTASSIUM SERPL-SCNC: 3.7 MEQ/L (ref 3.5–5.2)
POTASSIUM SERPL-SCNC: 3.7 MEQ/L (ref 3.5–5.2)
POTASSIUM SERPL-SCNC: 3.8 MEQ/L (ref 3.5–5.2)
POTASSIUM SERPL-SCNC: 3.9 MEQ/L (ref 3.5–5.2)
POTASSIUM SERPL-SCNC: 4.1 MEQ/L (ref 3.5–5.2)
POTASSIUM SERPL-SCNC: 4.2 MEQ/L (ref 3.5–5.4)
POTASSIUM SERPL-SCNC: 4.3 MEQ/L (ref 3.5–5.2)
POTASSIUM SERPL-SCNC: 4.3 MEQ/L (ref 3.5–5.2)
POTASSIUM SERPL-SCNC: 4.4 MEQ/L (ref 3.5–5.2)
POTASSIUM SERPL-SCNC: 4.4 MMOL/L (ref 3.5–5)
POTASSIUM SERPL-SCNC: 4.5 MEQ/L (ref 3.5–5.2)
POTASSIUM SERPL-SCNC: 4.7 MEQ/L (ref 3.5–5.2)
POTASSIUM SERPL-SCNC: 4.7 MMOL/L (ref 3.5–5)
POTASSIUM SERPL-SCNC: 5 MEQ/L (ref 3.5–5.2)
POTASSIUM SERPL-SCNC: 5 MMOL/L (ref 3.5–5)
POTASSIUM SERPL-SCNC: 5 MMOL/L (ref 3.5–5)
POTASSIUM SERPL-SCNC: 5.3 MEQ/L (ref 3.5–5.2)
PRO-BNP: 833 PG/ML (ref 0–1800)
PROCALCITONIN: 4.55 NG/ML (ref 0.01–0.09)
PROTEIN UA: NEGATIVE
RBC # BLD: 1.99 MILL/MM3 (ref 4.2–5.4)
RBC # BLD: 2.09 MILL/MM3 (ref 4.2–5.4)
RBC # BLD: 2.11 MILL/MM3 (ref 4.2–5.4)
RBC # BLD: 2.11 MILL/MM3 (ref 4.2–5.4)
RBC # BLD: 2.25 MILL/MM3 (ref 4.2–5.4)
RBC # BLD: 2.34 MILL/MM3 (ref 4.2–5.4)
RBC # BLD: 2.41 MILL/MM3 (ref 4.2–5.4)
RBC # BLD: 2.44 MILL/MM3 (ref 4.2–5.4)
RBC # BLD: 2.45 MILL/MM3 (ref 4.2–5.4)
RBC # BLD: 2.47 MILL/MM3 (ref 4.2–5.4)
RBC # BLD: 2.47 MILL/MM3 (ref 4.2–5.4)
RBC # BLD: 2.59 MILL/MM3 (ref 4.2–5.4)
RBC # BLD: 2.61 MILL/MM3 (ref 4.2–5.4)
RBC # BLD: 2.62 MILL/MM3 (ref 4.2–5.4)
RBC # BLD: 2.65 MILL/MM3 (ref 4.2–5.4)
RBC # BLD: 2.7 MILL/MM3 (ref 4.2–5.4)
RBC # BLD: 2.71 MILL/MM3 (ref 4.2–5.4)
RBC # BLD: 2.71 MILL/MM3 (ref 4.2–5.4)
RBC BAL: 800 /CUMM
RESPIRATORY CULTURE: ABNORMAL
RH FACTOR: NORMAL
ROTAVIRUS A PCR: NOT DETECTED
SALMONELLA PCR: NOT DETECTED
SAPOVIRUS PCR: NOT DETECTED
SCAN OF BLOOD SMEAR: NORMAL
SCAN OF BLOOD SMEAR: NORMAL
SEDIMENTATION RATE, ERYTHROCYTE: 18 MM/H (ref 0–30)
SEG NEUTROPHILS: 71.4 %
SEG NEUTROPHILS: 73.5 %
SEG NEUTROPHILS: 88.2 %
SEG NEUTROPHILS: 89 %
SEGMENTED NEUTROPHILS ABSOLUTE COUNT: 10 THOU/MM3 (ref 1.8–7.7)
SEGMENTED NEUTROPHILS ABSOLUTE COUNT: 10.5 THOU/MM3 (ref 1.8–7.7)
SEGMENTED NEUTROPHILS ABSOLUTE COUNT: 3.7 THOU/MM3 (ref 1.8–7.7)
SEGMENTED NEUTROPHILS ABSOLUTE COUNT: 4.2 THOU/MM3 (ref 1.8–7.7)
SEGMENTED NEUTROPHILS, BAL: 98 % (ref 0–3)
SET PEEP: 6 MMHG
SET PEEP: 6 MMHG
SET PRESS SUPP: 10 CMH2O
SET PRESS SUPP: 10 CMH2O
SODIUM BLD-SCNC: 134 MEQ/L (ref 135–145)
SODIUM BLD-SCNC: 135 MEQ/L (ref 135–145)
SODIUM BLD-SCNC: 136 MEQ/L (ref 135–145)
SODIUM BLD-SCNC: 136 MEQ/L (ref 135–145)
SODIUM BLD-SCNC: 137 MEQ/L (ref 135–145)
SODIUM BLD-SCNC: 137 MEQ/L (ref 135–145)
SODIUM BLD-SCNC: 138 MEQ/L (ref 135–145)
SODIUM BLD-SCNC: 138 MMOL/L (ref 134–146)
SODIUM BLD-SCNC: 140 MEQ/L (ref 135–145)
SODIUM BLD-SCNC: 140 MMOL/L (ref 134–146)
SODIUM BLD-SCNC: 141 MEQ/L (ref 135–145)
SODIUM BLD-SCNC: 141 MEQ/L (ref 135–145)
SODIUM BLD-SCNC: 142 MEQ/L (ref 135–145)
SODIUM BLD-SCNC: 142 MEQ/L (ref 135–145)
SODIUM BLD-SCNC: 143 MEQ/L (ref 135–145)
SODIUM BLD-SCNC: 143 MEQ/L (ref 135–146)
SODIUM BLD-SCNC: 144 MEQ/L (ref 135–145)
SODIUM BLD-SCNC: 148 MEQ/L (ref 135–145)
SOURCE, BLOOD GAS: ABNORMAL
SOURCE: NORMAL
SPECIFIC GRAVITY, URINE: 1.02 (ref 1–1.03)
TOTAL IRON BINDING CAPACITY: 283 MCG/DL (ref 250–450)
TOTAL IRON BINDING CAPACITY: 365 UG/DL (ref 250–425)
TOTAL NUCLEATED CELLS BAL: 1270 /CUMM
TOTAL PROTEIN: 5.4 G/DL (ref 6.1–8)
TOTAL PROTEIN: 5.4 G/DL (ref 6.1–8)
TOTAL PROTEIN: 6.3 G/DL (ref 6.1–8)
TOTAL PROTEIN: 6.4 G/DL (ref 6–8)
TOTAL PROTEIN: 6.7 G/DL (ref 6.1–8)
TOTAL VOLUME RECEIVED BAL: 30 ML
TROPONIN T: 0.07 NG/ML
TSH SERPL DL<=0.05 MIU/L-ACNC: 0.69 UIU/ML (ref 0.4–4.2)
UNSATURATED IRON BINDING CAPACITY: 196 UG/DL (ref 112–347)
URIC ACID: 2.9 MG/DL (ref 2.6–7.2)
URIC ACID: 6.6 MG/DL (ref 2.6–7.2)
URINE CULTURE, ROUTINE: ABNORMAL
UROBILINOGEN, URINE: 0.2 EU/DL (ref 0–1)
VANCOMYCIN RESISTANT ENTEROCOCCUS: NEGATIVE
VIBRIO CHOLERAE PCR: NOT DETECTED
VIBRIO PCR: NOT DETECTED
VITAMIN B-12: 347 PG/ML (ref 211–911)
VITAMIN D 25-HYDROXY: 39 NG/ML
VITAMIN D 25-HYDROXY: 43 NG/ML
WBC # BLD: 10.4 THOU/MM3 (ref 4.8–10.8)
WBC # BLD: 11.3 THOU/MM3 (ref 4.8–10.8)
WBC # BLD: 11.7 THOU/MM3 (ref 4.8–10.8)
WBC # BLD: 11.8 THOU/MM3 (ref 4.8–10.8)
WBC # BLD: 12.7 THOU/MM3 (ref 4.8–10.8)
WBC # BLD: 13.1 THOU/MM3 (ref 4.8–10.8)
WBC # BLD: 14.3 THOU/MM3 (ref 4.8–10.8)
WBC # BLD: 15.5 THOU/MM3 (ref 4.8–10.8)
WBC # BLD: 16.5 THOU/MM3 (ref 4.8–10.8)
WBC # BLD: 5.2 THOU/MM3 (ref 4.8–10.8)
WBC # BLD: 5.7 THOU/MM3 (ref 4.8–10.8)
WBC # BLD: 6.4 THOU/MM3 (ref 4.8–10.8)
WBC # BLD: 7.7 THOU/MM3 (ref 4.8–10.8)
WBC # BLD: 7.7 THOU/MM3 (ref 4.8–10.8)
WBC # BLD: 8.3 THOU/MM3 (ref 4.8–10.8)
WBC # BLD: 8.9 THOU/MM3 (ref 4.8–10.8)
WBC # BLD: 9 THOU/MM3 (ref 4.8–10.8)
WBC # BLD: 9.3 THOU/MM3 (ref 4.8–10.8)
YERSINIA ENTEROCOLITICA PCR: NOT DETECTED

## 2019-01-01 PROCEDURE — 94660 CPAP INITIATION&MGMT: CPT

## 2019-01-01 PROCEDURE — 36600 WITHDRAWAL OF ARTERIAL BLOOD: CPT

## 2019-01-01 PROCEDURE — 6370000000 HC RX 637 (ALT 250 FOR IP): Performed by: INTERNAL MEDICINE

## 2019-01-01 PROCEDURE — 2580000003 HC RX 258: Performed by: NURSE PRACTITIONER

## 2019-01-01 PROCEDURE — 2580000003 HC RX 258: Performed by: INTERNAL MEDICINE

## 2019-01-01 PROCEDURE — 2060000000 HC ICU INTERMEDIATE R&B

## 2019-01-01 PROCEDURE — 85610 PROTHROMBIN TIME: CPT

## 2019-01-01 PROCEDURE — 36415 COLL VENOUS BLD VENIPUNCTURE: CPT

## 2019-01-01 PROCEDURE — 94640 AIRWAY INHALATION TREATMENT: CPT

## 2019-01-01 PROCEDURE — 71045 X-RAY EXAM CHEST 1 VIEW: CPT

## 2019-01-01 PROCEDURE — 94669 MECHANICAL CHEST WALL OSCILL: CPT

## 2019-01-01 PROCEDURE — 1123F ACP DISCUSS/DSCN MKR DOCD: CPT | Performed by: INTERNAL MEDICINE

## 2019-01-01 PROCEDURE — 1090F PRES/ABSN URINE INCON ASSESS: CPT | Performed by: INTERNAL MEDICINE

## 2019-01-01 PROCEDURE — 82948 REAGENT STRIP/BLOOD GLUCOSE: CPT

## 2019-01-01 PROCEDURE — 93005 ELECTROCARDIOGRAM TRACING: CPT | Performed by: EMERGENCY MEDICINE

## 2019-01-01 PROCEDURE — 93010 ELECTROCARDIOGRAM REPORT: CPT | Performed by: INTERNAL MEDICINE

## 2019-01-01 PROCEDURE — 6360000002 HC RX W HCPCS: Performed by: PHYSICIAN ASSISTANT

## 2019-01-01 PROCEDURE — 6360000002 HC RX W HCPCS: Performed by: NURSE PRACTITIONER

## 2019-01-01 PROCEDURE — 97110 THERAPEUTIC EXERCISES: CPT

## 2019-01-01 PROCEDURE — 2580000003 HC RX 258: Performed by: REGISTERED NURSE

## 2019-01-01 PROCEDURE — 6360000002 HC RX W HCPCS: Performed by: INTERNAL MEDICINE

## 2019-01-01 PROCEDURE — 85027 COMPLETE CBC AUTOMATED: CPT

## 2019-01-01 PROCEDURE — 2700000000 HC OXYGEN THERAPY PER DAY

## 2019-01-01 PROCEDURE — 85730 THROMBOPLASTIN TIME PARTIAL: CPT

## 2019-01-01 PROCEDURE — 99213 OFFICE O/P EST LOW 20 MIN: CPT | Performed by: NURSE PRACTITIONER

## 2019-01-01 PROCEDURE — 97535 SELF CARE MNGMENT TRAINING: CPT

## 2019-01-01 PROCEDURE — 1036F TOBACCO NON-USER: CPT | Performed by: NURSE PRACTITIONER

## 2019-01-01 PROCEDURE — 2500000003 HC RX 250 WO HCPCS: Performed by: INTERNAL MEDICINE

## 2019-01-01 PROCEDURE — 99232 SBSQ HOSP IP/OBS MODERATE 35: CPT | Performed by: INTERNAL MEDICINE

## 2019-01-01 PROCEDURE — 6370000000 HC RX 637 (ALT 250 FOR IP): Performed by: NURSE PRACTITIONER

## 2019-01-01 PROCEDURE — 2709999900 HC NON-CHARGEABLE SUPPLY

## 2019-01-01 PROCEDURE — 4040F PNEUMOC VAC/ADMIN/RCVD: CPT | Performed by: INTERNAL MEDICINE

## 2019-01-01 PROCEDURE — 2500000003 HC RX 250 WO HCPCS

## 2019-01-01 PROCEDURE — 1200000000 HC SEMI PRIVATE

## 2019-01-01 PROCEDURE — 0BCB8ZZ EXTIRPATION OF MATTER FROM LEFT LOWER LOBE BRONCHUS, VIA NATURAL OR ARTIFICIAL OPENING ENDOSCOPIC: ICD-10-PCS | Performed by: INTERNAL MEDICINE

## 2019-01-01 PROCEDURE — 31500 INSERT EMERGENCY AIRWAY: CPT

## 2019-01-01 PROCEDURE — 87081 CULTURE SCREEN ONLY: CPT

## 2019-01-01 PROCEDURE — 80048 BASIC METABOLIC PNL TOTAL CA: CPT

## 2019-01-01 PROCEDURE — 31500 INSERT EMERGENCY AIRWAY: CPT | Performed by: INTERNAL MEDICINE

## 2019-01-01 PROCEDURE — 5A1945Z RESPIRATORY VENTILATION, 24-96 CONSECUTIVE HOURS: ICD-10-PCS | Performed by: INTERNAL MEDICINE

## 2019-01-01 PROCEDURE — 6370000000 HC RX 637 (ALT 250 FOR IP): Performed by: PHYSICIAN ASSISTANT

## 2019-01-01 PROCEDURE — 83605 ASSAY OF LACTIC ACID: CPT

## 2019-01-01 PROCEDURE — 2500000003 HC RX 250 WO HCPCS: Performed by: REGISTERED NURSE

## 2019-01-01 PROCEDURE — 88305 TISSUE EXAM BY PATHOLOGIST: CPT

## 2019-01-01 PROCEDURE — G8427 DOCREV CUR MEDS BY ELIG CLIN: HCPCS | Performed by: INTERNAL MEDICINE

## 2019-01-01 PROCEDURE — 94761 N-INVAS EAR/PLS OXIMETRY MLT: CPT

## 2019-01-01 PROCEDURE — 96374 THER/PROPH/DIAG INJ IV PUSH: CPT

## 2019-01-01 PROCEDURE — 87102 FUNGUS ISOLATION CULTURE: CPT

## 2019-01-01 PROCEDURE — 2709999900 HC NON-CHARGEABLE SUPPLY: Performed by: INTERNAL MEDICINE

## 2019-01-01 PROCEDURE — G8598 ASA/ANTIPLAT THER USED: HCPCS | Performed by: INTERNAL MEDICINE

## 2019-01-01 PROCEDURE — 96376 TX/PRO/DX INJ SAME DRUG ADON: CPT

## 2019-01-01 PROCEDURE — 99291 CRITICAL CARE FIRST HOUR: CPT | Performed by: INTERNAL MEDICINE

## 2019-01-01 PROCEDURE — G8417 CALC BMI ABV UP PARAM F/U: HCPCS | Performed by: INTERNAL MEDICINE

## 2019-01-01 PROCEDURE — 87500 VANOMYCIN DNA AMP PROBE: CPT

## 2019-01-01 PROCEDURE — 82803 BLOOD GASES ANY COMBINATION: CPT

## 2019-01-01 PROCEDURE — 94770 HC ETCO2 MONITOR DAILY: CPT

## 2019-01-01 PROCEDURE — 02HV33Z INSERTION OF INFUSION DEVICE INTO SUPERIOR VENA CAVA, PERCUTANEOUS APPROACH: ICD-10-PCS | Performed by: INTERNAL MEDICINE

## 2019-01-01 PROCEDURE — G0378 HOSPITAL OBSERVATION PER HR: HCPCS

## 2019-01-01 PROCEDURE — 2000000000 HC ICU R&B

## 2019-01-01 PROCEDURE — 1036F TOBACCO NON-USER: CPT | Performed by: INTERNAL MEDICINE

## 2019-01-01 PROCEDURE — 89051 BODY FLUID CELL COUNT: CPT

## 2019-01-01 PROCEDURE — 87205 SMEAR GRAM STAIN: CPT

## 2019-01-01 PROCEDURE — 82248 BILIRUBIN DIRECT: CPT

## 2019-01-01 PROCEDURE — 84484 ASSAY OF TROPONIN QUANT: CPT

## 2019-01-01 PROCEDURE — 76856 US EXAM PELVIC COMPLETE: CPT

## 2019-01-01 PROCEDURE — 97530 THERAPEUTIC ACTIVITIES: CPT

## 2019-01-01 PROCEDURE — 87486 CHLMYD PNEUM DNA AMP PROBE: CPT

## 2019-01-01 PROCEDURE — 99233 SBSQ HOSP IP/OBS HIGH 50: CPT | Performed by: INTERNAL MEDICINE

## 2019-01-01 PROCEDURE — 80053 COMPREHEN METABOLIC PANEL: CPT

## 2019-01-01 PROCEDURE — 83735 ASSAY OF MAGNESIUM: CPT

## 2019-01-01 PROCEDURE — 92610 EVALUATE SWALLOWING FUNCTION: CPT

## 2019-01-01 PROCEDURE — 4040F PNEUMOC VAC/ADMIN/RCVD: CPT | Performed by: NURSE PRACTITIONER

## 2019-01-01 PROCEDURE — 87186 SC STD MICRODIL/AGAR DIL: CPT

## 2019-01-01 PROCEDURE — 93005 ELECTROCARDIOGRAM TRACING: CPT | Performed by: NURSE PRACTITIONER

## 2019-01-01 PROCEDURE — 1123F ACP DISCUSS/DSCN MKR DOCD: CPT | Performed by: NURSE PRACTITIONER

## 2019-01-01 PROCEDURE — 86356 MONONUCLEAR CELL ANTIGEN: CPT

## 2019-01-01 PROCEDURE — 88112 CYTOPATH CELL ENHANCE TECH: CPT

## 2019-01-01 PROCEDURE — 3609010800 HC BRONCHOSCOPY ALVEOLAR LAVAGE: Performed by: INTERNAL MEDICINE

## 2019-01-01 PROCEDURE — 81003 URINALYSIS AUTO W/O SCOPE: CPT

## 2019-01-01 PROCEDURE — 84443 ASSAY THYROID STIM HORMONE: CPT

## 2019-01-01 PROCEDURE — 87641 MR-STAPH DNA AMP PROBE: CPT

## 2019-01-01 PROCEDURE — 74176 CT ABD & PELVIS W/O CONTRAST: CPT

## 2019-01-01 PROCEDURE — 99284 EMERGENCY DEPT VISIT MOD MDM: CPT

## 2019-01-01 PROCEDURE — 99232 SBSQ HOSP IP/OBS MODERATE 35: CPT | Performed by: NURSE PRACTITIONER

## 2019-01-01 PROCEDURE — 97116 GAIT TRAINING THERAPY: CPT

## 2019-01-01 PROCEDURE — 0BC88ZZ EXTIRPATION OF MATTER FROM LEFT UPPER LOBE BRONCHUS, VIA NATURAL OR ARTIFICIAL OPENING ENDOSCOPIC: ICD-10-PCS | Performed by: INTERNAL MEDICINE

## 2019-01-01 PROCEDURE — 82947 ASSAY GLUCOSE BLOOD QUANT: CPT

## 2019-01-01 PROCEDURE — 0BH18EZ INSERTION OF ENDOTRACHEAL AIRWAY INTO TRACHEA, VIA NATURAL OR ARTIFICIAL OPENING ENDOSCOPIC: ICD-10-PCS | Performed by: INTERNAL MEDICINE

## 2019-01-01 PROCEDURE — 6360000002 HC RX W HCPCS

## 2019-01-01 PROCEDURE — 0097U HC GI PTHGN MULT REV TRANS & AMP PRB TECH 22 TRGT: CPT

## 2019-01-01 PROCEDURE — 2580000003 HC RX 258: Performed by: PHYSICIAN ASSISTANT

## 2019-01-01 PROCEDURE — G8484 FLU IMMUNIZE NO ADMIN: HCPCS | Performed by: INTERNAL MEDICINE

## 2019-01-01 PROCEDURE — G8427 DOCREV CUR MEDS BY ELIG CLIN: HCPCS | Performed by: NURSE PRACTITIONER

## 2019-01-01 PROCEDURE — 83036 HEMOGLOBIN GLYCOSYLATED A1C: CPT

## 2019-01-01 PROCEDURE — 86901 BLOOD TYPING SEROLOGIC RH(D): CPT

## 2019-01-01 PROCEDURE — 6360000002 HC RX W HCPCS: Performed by: REGISTERED NURSE

## 2019-01-01 PROCEDURE — G8484 FLU IMMUNIZE NO ADMIN: HCPCS | Performed by: NURSE PRACTITIONER

## 2019-01-01 PROCEDURE — 3609027000 HC BRONCHOSCOPY: Performed by: INTERNAL MEDICINE

## 2019-01-01 PROCEDURE — 87116 MYCOBACTERIA CULTURE: CPT

## 2019-01-01 PROCEDURE — 87147 CULTURE TYPE IMMUNOLOGIC: CPT

## 2019-01-01 PROCEDURE — 99214 OFFICE O/P EST MOD 30 MIN: CPT | Performed by: INTERNAL MEDICINE

## 2019-01-01 PROCEDURE — 1200000003 HC TELEMETRY R&B

## 2019-01-01 PROCEDURE — 94003 VENT MGMT INPAT SUBQ DAY: CPT

## 2019-01-01 PROCEDURE — 99233 SBSQ HOSP IP/OBS HIGH 50: CPT | Performed by: NURSE PRACTITIONER

## 2019-01-01 PROCEDURE — APPSS30 APP SPLIT SHARED TIME 16-30 MINUTES: Performed by: NURSE PRACTITIONER

## 2019-01-01 PROCEDURE — 36592 COLLECT BLOOD FROM PICC: CPT

## 2019-01-01 PROCEDURE — 87077 CULTURE AEROBIC IDENTIFY: CPT

## 2019-01-01 PROCEDURE — 51798 US URINE CAPACITY MEASURE: CPT

## 2019-01-01 PROCEDURE — 0BC78ZZ EXTIRPATION OF MATTER FROM LEFT MAIN BRONCHUS, VIA NATURAL OR ARTIFICIAL OPENING ENDOSCOPIC: ICD-10-PCS | Performed by: INTERNAL MEDICINE

## 2019-01-01 PROCEDURE — 96361 HYDRATE IV INFUSION ADD-ON: CPT

## 2019-01-01 PROCEDURE — 76604 US EXAM CHEST: CPT

## 2019-01-01 PROCEDURE — G8598 ASA/ANTIPLAT THER USED: HCPCS | Performed by: NURSE PRACTITIONER

## 2019-01-01 PROCEDURE — 84145 PROCALCITONIN (PCT): CPT

## 2019-01-01 PROCEDURE — 7100000000 HC PACU RECOVERY - FIRST 15 MIN: Performed by: INTERNAL MEDICINE

## 2019-01-01 PROCEDURE — 87086 URINE CULTURE/COLONY COUNT: CPT

## 2019-01-01 PROCEDURE — 83615 LACTATE (LD) (LDH) ENZYME: CPT

## 2019-01-01 PROCEDURE — 71250 CT THORAX DX C-: CPT

## 2019-01-01 PROCEDURE — 31645 BRNCHSC W/THER ASPIR 1ST: CPT | Performed by: INTERNAL MEDICINE

## 2019-01-01 PROCEDURE — 87106 FUNGI IDENTIFICATION YEAST: CPT

## 2019-01-01 PROCEDURE — 82607 VITAMIN B-12: CPT

## 2019-01-01 PROCEDURE — 99292 CRITICAL CARE ADDL 30 MIN: CPT | Performed by: INTERNAL MEDICINE

## 2019-01-01 PROCEDURE — 93970 EXTREMITY STUDY: CPT

## 2019-01-01 PROCEDURE — 0BC68ZZ EXTIRPATION OF MATTER FROM RIGHT LOWER LOBE BRONCHUS, VIA NATURAL OR ARTIFICIAL OPENING ENDOSCOPIC: ICD-10-PCS | Performed by: INTERNAL MEDICINE

## 2019-01-01 PROCEDURE — 2500000003 HC RX 250 WO HCPCS: Performed by: RADIOLOGY

## 2019-01-01 PROCEDURE — 99223 1ST HOSP IP/OBS HIGH 75: CPT | Performed by: INTERNAL MEDICINE

## 2019-01-01 PROCEDURE — 6360000002 HC RX W HCPCS: Performed by: RADIOLOGY

## 2019-01-01 PROCEDURE — 93005 ELECTROCARDIOGRAM TRACING: CPT | Performed by: INTERNAL MEDICINE

## 2019-01-01 PROCEDURE — 36430 TRANSFUSION BLD/BLD COMPNT: CPT

## 2019-01-01 PROCEDURE — 99226 PR SBSQ OBSERVATION CARE/DAY 35 MINUTES: CPT | Performed by: INTERNAL MEDICINE

## 2019-01-01 PROCEDURE — 87651 STREP A DNA AMP PROBE: CPT

## 2019-01-01 PROCEDURE — 2500000003 HC RX 250 WO HCPCS: Performed by: NURSE PRACTITIONER

## 2019-01-01 PROCEDURE — 99220 PR INITIAL OBSERVATION CARE/DAY 70 MINUTES: CPT | Performed by: PHYSICIAN ASSISTANT

## 2019-01-01 PROCEDURE — 83880 ASSAY OF NATRIURETIC PEPTIDE: CPT

## 2019-01-01 PROCEDURE — 3E0S33Z INTRODUCTION OF ANTI-INFLAMMATORY INTO EPIDURAL SPACE, PERCUTANEOUS APPROACH: ICD-10-PCS | Performed by: RADIOLOGY

## 2019-01-01 PROCEDURE — 97162 PT EVAL MOD COMPLEX 30 MIN: CPT

## 2019-01-01 PROCEDURE — 96375 TX/PRO/DX INJ NEW DRUG ADDON: CPT

## 2019-01-01 PROCEDURE — C1751 CATH, INF, PER/CENT/MIDLINE: HCPCS

## 2019-01-01 PROCEDURE — 85025 COMPLETE CBC W/AUTO DIFF WBC: CPT

## 2019-01-01 PROCEDURE — 86900 BLOOD TYPING SEROLOGIC ABO: CPT

## 2019-01-01 PROCEDURE — 0BC38ZZ EXTIRPATION OF MATTER FROM RIGHT MAIN BRONCHUS, VIA NATURAL OR ARTIFICIAL OPENING ENDOSCOPIC: ICD-10-PCS | Performed by: INTERNAL MEDICINE

## 2019-01-01 PROCEDURE — 1101F PT FALLS ASSESS-DOCD LE1/YR: CPT | Performed by: INTERNAL MEDICINE

## 2019-01-01 PROCEDURE — 87581 M.PNEUMON DNA AMP PROBE: CPT

## 2019-01-01 PROCEDURE — 3E0S3BZ INTRODUCTION OF ANESTHETIC AGENT INTO EPIDURAL SPACE, PERCUTANEOUS APPROACH: ICD-10-PCS | Performed by: RADIOLOGY

## 2019-01-01 PROCEDURE — 94002 VENT MGMT INPAT INIT DAY: CPT

## 2019-01-01 PROCEDURE — 36556 INSERT NON-TUNNEL CV CATH: CPT

## 2019-01-01 PROCEDURE — 6370000000 HC RX 637 (ALT 250 FOR IP): Performed by: EMERGENCY MEDICINE

## 2019-01-01 PROCEDURE — 71046 X-RAY EXAM CHEST 2 VIEWS: CPT

## 2019-01-01 PROCEDURE — G8417 CALC BMI ABV UP PARAM F/U: HCPCS | Performed by: NURSE PRACTITIONER

## 2019-01-01 PROCEDURE — 83690 ASSAY OF LIPASE: CPT

## 2019-01-01 PROCEDURE — 87070 CULTURE OTHR SPECIMN AEROBIC: CPT

## 2019-01-01 PROCEDURE — 6360000002 HC RX W HCPCS: Performed by: EMERGENCY MEDICINE

## 2019-01-01 PROCEDURE — 82746 ASSAY OF FOLIC ACID SERUM: CPT

## 2019-01-01 PROCEDURE — 6360000004 HC RX CONTRAST MEDICATION: Performed by: RADIOLOGY

## 2019-01-01 PROCEDURE — 51702 INSERT TEMP BLADDER CATH: CPT

## 2019-01-01 PROCEDURE — 2500000003 HC RX 250 WO HCPCS: Performed by: PHYSICIAN ASSISTANT

## 2019-01-01 PROCEDURE — 99239 HOSP IP/OBS DSCHRG MGMT >30: CPT | Performed by: INTERNAL MEDICINE

## 2019-01-01 PROCEDURE — 28001 DRAINAGE OF BURSA OF FOOT: CPT | Performed by: INTERNAL MEDICINE

## 2019-01-01 PROCEDURE — 87798 DETECT AGENT NOS DNA AMP: CPT

## 2019-01-01 PROCEDURE — 72131 CT LUMBAR SPINE W/O DYE: CPT

## 2019-01-01 PROCEDURE — P9016 RBC LEUKOCYTES REDUCED: HCPCS

## 2019-01-01 PROCEDURE — 82140 ASSAY OF AMMONIA: CPT

## 2019-01-01 PROCEDURE — 96360 HYDRATION IV INFUSION INIT: CPT

## 2019-01-01 PROCEDURE — 83921 ORGANIC ACID SINGLE QUANT: CPT

## 2019-01-01 PROCEDURE — 97166 OT EVAL MOD COMPLEX 45 MIN: CPT

## 2019-01-01 PROCEDURE — 0B9J8ZX DRAINAGE OF LEFT LOWER LUNG LOBE, VIA NATURAL OR ARTIFICIAL OPENING ENDOSCOPIC, DIAGNOSTIC: ICD-10-PCS | Performed by: INTERNAL MEDICINE

## 2019-01-01 PROCEDURE — 36556 INSERT NON-TUNNEL CV CATH: CPT | Performed by: INTERNAL MEDICINE

## 2019-01-01 PROCEDURE — 86850 RBC ANTIBODY SCREEN: CPT

## 2019-01-01 PROCEDURE — 62323 NJX INTERLAMINAR LMBR/SAC: CPT

## 2019-01-01 PROCEDURE — 86923 COMPATIBILITY TEST ELECTRIC: CPT

## 2019-01-01 PROCEDURE — 87541 LEGION PNEUMO DNA AMP PROB: CPT

## 2019-01-01 PROCEDURE — 85049 AUTOMATED PLATELET COUNT: CPT

## 2019-01-01 PROCEDURE — 1090F PRES/ABSN URINE INCON ASSESS: CPT | Performed by: NURSE PRACTITIONER

## 2019-01-01 PROCEDURE — 84155 ASSAY OF PROTEIN SERUM: CPT

## 2019-01-01 PROCEDURE — 3700000000 HC ANESTHESIA ATTENDED CARE: Performed by: INTERNAL MEDICINE

## 2019-01-01 PROCEDURE — 6370000000 HC RX 637 (ALT 250 FOR IP)

## 2019-01-01 PROCEDURE — 99285 EMERGENCY DEPT VISIT HI MDM: CPT

## 2019-01-01 PROCEDURE — 99221 1ST HOSP IP/OBS SF/LOW 40: CPT | Performed by: NURSE PRACTITIONER

## 2019-01-01 PROCEDURE — 2580000003 HC RX 258: Performed by: EMERGENCY MEDICINE

## 2019-01-01 PROCEDURE — 72148 MRI LUMBAR SPINE W/O DYE: CPT

## 2019-01-01 PROCEDURE — 7100000001 HC PACU RECOVERY - ADDTL 15 MIN: Performed by: INTERNAL MEDICINE

## 2019-01-01 PROCEDURE — 3700000001 HC ADD 15 MINUTES (ANESTHESIA): Performed by: INTERNAL MEDICINE

## 2019-01-01 PROCEDURE — 74018 RADEX ABDOMEN 1 VIEW: CPT

## 2019-01-01 PROCEDURE — 87633 RESP VIRUS 12-25 TARGETS: CPT

## 2019-01-01 PROCEDURE — 96372 THER/PROPH/DIAG INJ SC/IM: CPT

## 2019-01-01 RX ORDER — METHYLPREDNISOLONE ACETATE 80 MG/ML
80 INJECTION, SUSPENSION INTRA-ARTICULAR; INTRALESIONAL; INTRAMUSCULAR; SOFT TISSUE ONCE
Status: CANCELLED | OUTPATIENT
Start: 2019-01-01 | End: 2019-01-01

## 2019-01-01 RX ORDER — ALBUTEROL SULFATE 2.5 MG/3ML
2.5 SOLUTION RESPIRATORY (INHALATION) 2 TIMES DAILY
Status: DISCONTINUED | OUTPATIENT
Start: 2019-01-01 | End: 2019-01-01

## 2019-01-01 RX ORDER — PROPOFOL 10 MG/ML
INJECTION, EMULSION INTRAVENOUS
Status: COMPLETED
Start: 2019-01-01 | End: 2019-01-01

## 2019-01-01 RX ORDER — LIDOCAINE 4 G/G
1 PATCH TOPICAL DAILY
Status: DISCONTINUED | OUTPATIENT
Start: 2019-01-01 | End: 2019-01-01 | Stop reason: HOSPADM

## 2019-01-01 RX ORDER — INSULIN GLARGINE 100 [IU]/ML
15 INJECTION, SOLUTION SUBCUTANEOUS NIGHTLY
Status: DISCONTINUED | OUTPATIENT
Start: 2019-01-01 | End: 2019-01-01

## 2019-01-01 RX ORDER — GABAPENTIN 100 MG/1
100 CAPSULE ORAL 3 TIMES DAILY
Status: DISCONTINUED | OUTPATIENT
Start: 2019-01-01 | End: 2019-01-01

## 2019-01-01 RX ORDER — DIPHENHYDRAMINE HYDROCHLORIDE 50 MG/ML
50 INJECTION INTRAMUSCULAR; INTRAVENOUS ONCE
Status: CANCELLED | OUTPATIENT
Start: 2019-01-01

## 2019-01-01 RX ORDER — GLYCOPYRROLATE 1 MG/5 ML
SYRINGE (ML) INTRAVENOUS PRN
Status: DISCONTINUED | OUTPATIENT
Start: 2019-01-01 | End: 2019-01-01 | Stop reason: SDUPTHER

## 2019-01-01 RX ORDER — GABAPENTIN 100 MG/1
200 CAPSULE ORAL 3 TIMES DAILY
Status: DISCONTINUED | OUTPATIENT
Start: 2019-01-01 | End: 2019-01-01

## 2019-01-01 RX ORDER — INSULIN GLARGINE 100 [IU]/ML
7 INJECTION, SOLUTION SUBCUTANEOUS NIGHTLY
Status: DISCONTINUED | OUTPATIENT
Start: 2019-01-01 | End: 2019-01-01

## 2019-01-01 RX ORDER — LIDOCAINE HYDROCHLORIDE 20 MG/ML
INJECTION, SOLUTION INFILTRATION; PERINEURAL PRN
Status: DISCONTINUED | OUTPATIENT
Start: 2019-01-01 | End: 2019-01-01 | Stop reason: ALTCHOICE

## 2019-01-01 RX ORDER — EPINEPHRINE 1 MG/ML
0.3 INJECTION, SOLUTION, CONCENTRATE INTRAVENOUS PRN
Status: CANCELLED | OUTPATIENT
Start: 2019-01-01

## 2019-01-01 RX ORDER — DEXAMETHASONE SODIUM PHOSPHATE 4 MG/ML
4 INJECTION, SOLUTION INTRA-ARTICULAR; INTRALESIONAL; INTRAMUSCULAR; INTRAVENOUS; SOFT TISSUE DAILY
Status: COMPLETED | OUTPATIENT
Start: 2019-01-01 | End: 2019-01-01

## 2019-01-01 RX ORDER — KETAMINE HCL IN NACL, ISO-OSM 100MG/10ML
50 SYRINGE (ML) INJECTION ONCE
Status: DISCONTINUED | OUTPATIENT
Start: 2019-01-01 | End: 2019-01-01

## 2019-01-01 RX ORDER — MORPHINE SULFATE 2 MG/ML
2 INJECTION, SOLUTION INTRAMUSCULAR; INTRAVENOUS ONCE
Status: COMPLETED | OUTPATIENT
Start: 2019-01-01 | End: 2019-01-01

## 2019-01-01 RX ORDER — DEXTROSE AND SODIUM CHLORIDE 5; .45 G/100ML; G/100ML
INJECTION, SOLUTION INTRAVENOUS CONTINUOUS
Status: DISCONTINUED | OUTPATIENT
Start: 2019-01-01 | End: 2019-01-01

## 2019-01-01 RX ORDER — SODIUM CHLORIDE 450 MG/100ML
INJECTION, SOLUTION INTRAVENOUS CONTINUOUS PRN
Status: DISCONTINUED | OUTPATIENT
Start: 2019-01-01 | End: 2019-01-01 | Stop reason: SDUPTHER

## 2019-01-01 RX ORDER — SODIUM CHLORIDE 0.9 % (FLUSH) 0.9 %
5 SYRINGE (ML) INJECTION PRN
Status: CANCELLED | OUTPATIENT
Start: 2019-01-01

## 2019-01-01 RX ORDER — FUROSEMIDE 10 MG/ML
40 INJECTION INTRAMUSCULAR; INTRAVENOUS ONCE
Status: COMPLETED | OUTPATIENT
Start: 2019-01-01 | End: 2019-01-01

## 2019-01-01 RX ORDER — OXYBUTYNIN CHLORIDE 5 MG/1
5 TABLET ORAL 2 TIMES DAILY
Status: DISCONTINUED | OUTPATIENT
Start: 2019-01-01 | End: 2019-01-01 | Stop reason: HOSPADM

## 2019-01-01 RX ORDER — GABAPENTIN 100 MG/1
200 CAPSULE ORAL 2 TIMES DAILY
Status: DISCONTINUED | OUTPATIENT
Start: 2019-01-01 | End: 2019-01-01

## 2019-01-01 RX ORDER — SODIUM BICARBONATE 650 MG/1
1300 TABLET ORAL 2 TIMES DAILY
Status: DISCONTINUED | OUTPATIENT
Start: 2019-01-01 | End: 2019-01-01

## 2019-01-01 RX ORDER — 0.9 % SODIUM CHLORIDE 0.9 %
250 INTRAVENOUS SOLUTION INTRAVENOUS ONCE
Status: COMPLETED | OUTPATIENT
Start: 2019-01-01 | End: 2019-01-01

## 2019-01-01 RX ORDER — IPRATROPIUM BROMIDE AND ALBUTEROL SULFATE 2.5; .5 MG/3ML; MG/3ML
1 SOLUTION RESPIRATORY (INHALATION) ONCE
Status: COMPLETED | OUTPATIENT
Start: 2019-01-01 | End: 2019-01-01

## 2019-01-01 RX ORDER — LIDOCAINE HYDROCHLORIDE 10 MG/ML
INJECTION, SOLUTION INFILTRATION; PERINEURAL PRN
Status: DISCONTINUED | OUTPATIENT
Start: 2019-01-01 | End: 2019-01-01 | Stop reason: ALTCHOICE

## 2019-01-01 RX ORDER — SODIUM CHLORIDE 9 MG/ML
INJECTION, SOLUTION INTRAVENOUS CONTINUOUS
Status: DISCONTINUED | OUTPATIENT
Start: 2019-01-01 | End: 2019-01-01

## 2019-01-01 RX ORDER — HEPARIN SODIUM 1000 [USP'U]/ML
2000 INJECTION, SOLUTION INTRAVENOUS; SUBCUTANEOUS PRN
Status: DISCONTINUED | OUTPATIENT
Start: 2019-01-01 | End: 2019-01-01

## 2019-01-01 RX ORDER — BUPIVACAINE HYDROCHLORIDE 2.5 MG/ML
5 INJECTION, SOLUTION EPIDURAL; INFILTRATION; INTRACAUDAL ONCE
Status: COMPLETED | OUTPATIENT
Start: 2019-01-01 | End: 2019-01-01

## 2019-01-01 RX ORDER — HEPARIN SODIUM (PORCINE) LOCK FLUSH IV SOLN 100 UNIT/ML 100 UNIT/ML
500 SOLUTION INTRAVENOUS PRN
Status: CANCELLED | OUTPATIENT
Start: 2019-01-01

## 2019-01-01 RX ORDER — METHYLPREDNISOLONE SODIUM SUCCINATE 40 MG/ML
40 INJECTION, POWDER, LYOPHILIZED, FOR SOLUTION INTRAMUSCULAR; INTRAVENOUS EVERY 8 HOURS
Status: DISCONTINUED | OUTPATIENT
Start: 2019-01-01 | End: 2019-01-01

## 2019-01-01 RX ORDER — FUROSEMIDE 40 MG/1
40 TABLET ORAL DAILY
Status: DISCONTINUED | OUTPATIENT
Start: 2019-01-01 | End: 2019-01-01

## 2019-01-01 RX ORDER — ISOSORBIDE MONONITRATE 60 MG/1
60 TABLET, EXTENDED RELEASE ORAL DAILY
Status: DISCONTINUED | OUTPATIENT
Start: 2019-01-01 | End: 2019-01-01

## 2019-01-01 RX ORDER — HEPARIN SODIUM 1000 [USP'U]/ML
4000 INJECTION, SOLUTION INTRAVENOUS; SUBCUTANEOUS ONCE
Status: COMPLETED | OUTPATIENT
Start: 2019-01-01 | End: 2019-01-01

## 2019-01-01 RX ORDER — HYDRALAZINE HYDROCHLORIDE 20 MG/ML
10 INJECTION INTRAMUSCULAR; INTRAVENOUS ONCE
Status: COMPLETED | OUTPATIENT
Start: 2019-01-01 | End: 2019-01-01

## 2019-01-01 RX ORDER — HYDRALAZINE HYDROCHLORIDE 20 MG/ML
10 INJECTION INTRAMUSCULAR; INTRAVENOUS EVERY 6 HOURS PRN
Status: DISCONTINUED | OUTPATIENT
Start: 2019-01-01 | End: 2019-01-01 | Stop reason: HOSPADM

## 2019-01-01 RX ORDER — LIDOCAINE 4 G/G
2 PATCH TOPICAL DAILY
DISCHARGE
Start: 2019-01-01

## 2019-01-01 RX ORDER — INSULIN GLARGINE 100 [IU]/ML
10 INJECTION, SOLUTION SUBCUTANEOUS NIGHTLY
Status: DISCONTINUED | OUTPATIENT
Start: 2019-01-01 | End: 2019-01-01

## 2019-01-01 RX ORDER — PANTOPRAZOLE SODIUM 40 MG/1
40 TABLET, DELAYED RELEASE ORAL
Status: DISCONTINUED | OUTPATIENT
Start: 2019-01-01 | End: 2019-01-01 | Stop reason: ALTCHOICE

## 2019-01-01 RX ORDER — ACETAMINOPHEN 500 MG
1000 TABLET ORAL EVERY 8 HOURS
Status: DISCONTINUED | OUTPATIENT
Start: 2019-01-01 | End: 2019-01-01 | Stop reason: HOSPADM

## 2019-01-01 RX ORDER — METOPROLOL SUCCINATE 50 MG/1
50 TABLET, EXTENDED RELEASE ORAL DAILY
Status: DISCONTINUED | OUTPATIENT
Start: 2019-01-01 | End: 2019-01-01

## 2019-01-01 RX ORDER — ALLOPURINOL 300 MG/1
300 TABLET ORAL DAILY
Status: DISCONTINUED | OUTPATIENT
Start: 2019-01-01 | End: 2019-01-01

## 2019-01-01 RX ORDER — HYDRALAZINE HYDROCHLORIDE 50 MG/1
100 TABLET, FILM COATED ORAL 3 TIMES DAILY
Status: DISCONTINUED | OUTPATIENT
Start: 2019-01-01 | End: 2019-01-01

## 2019-01-01 RX ORDER — ALBUTEROL SULFATE 2.5 MG/3ML
2.5 SOLUTION RESPIRATORY (INHALATION) 4 TIMES DAILY
Status: DISCONTINUED | OUTPATIENT
Start: 2019-01-01 | End: 2019-01-01 | Stop reason: HOSPADM

## 2019-01-01 RX ORDER — LIDOCAINE 4 G/G
1 PATCH TOPICAL DAILY
Status: COMPLETED | OUTPATIENT
Start: 2019-01-01 | End: 2019-01-01

## 2019-01-01 RX ORDER — ATORVASTATIN CALCIUM 20 MG/1
20 TABLET, FILM COATED ORAL NIGHTLY
Status: DISCONTINUED | OUTPATIENT
Start: 2019-01-01 | End: 2019-01-01 | Stop reason: HOSPADM

## 2019-01-01 RX ORDER — ISOSORBIDE MONONITRATE 60 MG/1
60 TABLET, EXTENDED RELEASE ORAL DAILY
Status: DISCONTINUED | OUTPATIENT
Start: 2019-01-01 | End: 2019-01-01 | Stop reason: HOSPADM

## 2019-01-01 RX ORDER — AMOXICILLIN AND CLAVULANATE POTASSIUM 875; 125 MG/1; MG/1
TABLET, FILM COATED ORAL
Refills: 0 | COMMUNITY
Start: 2019-01-01 | End: 2019-01-01 | Stop reason: ALTCHOICE

## 2019-01-01 RX ORDER — OXYMETAZOLINE HYDROCHLORIDE 0.05 G/100ML
SPRAY NASAL PRN
Status: DISCONTINUED | OUTPATIENT
Start: 2019-01-01 | End: 2019-01-01 | Stop reason: ALTCHOICE

## 2019-01-01 RX ORDER — HYDRALAZINE HYDROCHLORIDE 50 MG/1
50 TABLET, FILM COATED ORAL EVERY 8 HOURS SCHEDULED
Status: DISCONTINUED | OUTPATIENT
Start: 2019-01-01 | End: 2019-01-01

## 2019-01-01 RX ORDER — PROPOFOL 10 MG/ML
INJECTION, EMULSION INTRAVENOUS PRN
Status: DISCONTINUED | OUTPATIENT
Start: 2019-01-01 | End: 2019-01-01 | Stop reason: SDUPTHER

## 2019-01-01 RX ORDER — SODIUM CHLORIDE 0.9 % (FLUSH) 0.9 %
10 SYRINGE (ML) INJECTION PRN
Status: DISCONTINUED | OUTPATIENT
Start: 2019-01-01 | End: 2019-01-01 | Stop reason: HOSPADM

## 2019-01-01 RX ORDER — MORPHINE SULFATE 2 MG/ML
INJECTION, SOLUTION INTRAMUSCULAR; INTRAVENOUS
Status: COMPLETED
Start: 2019-01-01 | End: 2019-01-01

## 2019-01-01 RX ORDER — METHYLPREDNISOLONE SODIUM SUCCINATE 125 MG/2ML
125 INJECTION, POWDER, LYOPHILIZED, FOR SOLUTION INTRAMUSCULAR; INTRAVENOUS ONCE
Status: CANCELLED | OUTPATIENT
Start: 2019-01-01

## 2019-01-01 RX ORDER — OMEPRAZOLE 40 MG/1
40 CAPSULE, DELAYED RELEASE ORAL EVERY OTHER DAY
COMMUNITY

## 2019-01-01 RX ORDER — SODIUM CHLORIDE 0.9 % (FLUSH) 0.9 %
10 SYRINGE (ML) INJECTION EVERY 12 HOURS SCHEDULED
Status: DISCONTINUED | OUTPATIENT
Start: 2019-01-01 | End: 2019-01-01 | Stop reason: HOSPADM

## 2019-01-01 RX ORDER — DEXTROSE MONOHYDRATE 25 G/50ML
12.5 INJECTION, SOLUTION INTRAVENOUS PRN
Status: DISCONTINUED | OUTPATIENT
Start: 2019-01-01 | End: 2019-01-01 | Stop reason: HOSPADM

## 2019-01-01 RX ORDER — SODIUM CHLORIDE 9 MG/ML
INJECTION, SOLUTION INTRAVENOUS CONTINUOUS
Status: CANCELLED | OUTPATIENT
Start: 2019-01-01

## 2019-01-01 RX ORDER — SODIUM CHLORIDE FOR INHALATION 3 %
4 VIAL, NEBULIZER (ML) INHALATION 2 TIMES DAILY
Status: DISCONTINUED | OUTPATIENT
Start: 2019-01-01 | End: 2019-01-01

## 2019-01-01 RX ORDER — NITROGLYCERIN 20 MG/100ML
5 INJECTION INTRAVENOUS CONTINUOUS
Status: DISCONTINUED | OUTPATIENT
Start: 2019-01-01 | End: 2019-01-01

## 2019-01-01 RX ORDER — CLOPIDOGREL BISULFATE 75 MG/1
75 TABLET ORAL DAILY
Status: DISCONTINUED | OUTPATIENT
Start: 2019-01-01 | End: 2019-01-01 | Stop reason: HOSPADM

## 2019-01-01 RX ORDER — LACTULOSE 10 G/15ML
20 SOLUTION ORAL 3 TIMES DAILY
Status: DISCONTINUED | OUTPATIENT
Start: 2019-01-01 | End: 2019-01-01

## 2019-01-01 RX ORDER — OXYCODONE HYDROCHLORIDE 5 MG/1
5 TABLET ORAL EVERY 4 HOURS PRN
Status: DISCONTINUED | OUTPATIENT
Start: 2019-01-01 | End: 2019-01-01

## 2019-01-01 RX ORDER — LIDOCAINE HYDROCHLORIDE 20 MG/ML
INJECTION, SOLUTION INFILTRATION; PERINEURAL PRN
Status: DISCONTINUED | OUTPATIENT
Start: 2019-01-01 | End: 2019-01-01 | Stop reason: SDUPTHER

## 2019-01-01 RX ORDER — METOPROLOL SUCCINATE 50 MG/1
50 TABLET, EXTENDED RELEASE ORAL DAILY
Status: DISCONTINUED | OUTPATIENT
Start: 2019-01-01 | End: 2019-01-01 | Stop reason: HOSPADM

## 2019-01-01 RX ORDER — ALLOPURINOL 300 MG/1
300 TABLET ORAL DAILY
Status: DISCONTINUED | OUTPATIENT
Start: 2019-01-01 | End: 2019-01-01 | Stop reason: HOSPADM

## 2019-01-01 RX ORDER — HEPARIN SODIUM 10000 [USP'U]/100ML
11 INJECTION, SOLUTION INTRAVENOUS CONTINUOUS
Status: DISCONTINUED | OUTPATIENT
Start: 2019-01-01 | End: 2019-01-01

## 2019-01-01 RX ORDER — OXYCODONE HYDROCHLORIDE 5 MG/1
10 TABLET ORAL EVERY 4 HOURS PRN
Status: DISCONTINUED | OUTPATIENT
Start: 2019-01-01 | End: 2019-01-01

## 2019-01-01 RX ORDER — BISACODYL 10 MG
10 SUPPOSITORY, RECTAL RECTAL DAILY PRN
Status: DISCONTINUED | OUTPATIENT
Start: 2019-01-01 | End: 2019-01-01 | Stop reason: HOSPADM

## 2019-01-01 RX ORDER — OMEPRAZOLE 40 MG/1
40 CAPSULE, DELAYED RELEASE ORAL DAILY
Status: DISCONTINUED | OUTPATIENT
Start: 2019-01-01 | End: 2019-01-01

## 2019-01-01 RX ORDER — IPRATROPIUM BROMIDE AND ALBUTEROL SULFATE 2.5; .5 MG/3ML; MG/3ML
1 SOLUTION RESPIRATORY (INHALATION)
Status: DISCONTINUED | OUTPATIENT
Start: 2019-01-01 | End: 2019-01-01

## 2019-01-01 RX ORDER — METOPROLOL SUCCINATE 25 MG/1
25 TABLET, EXTENDED RELEASE ORAL 2 TIMES DAILY
Status: DISCONTINUED | OUTPATIENT
Start: 2019-01-01 | End: 2019-01-01

## 2019-01-01 RX ORDER — GLIPIZIDE 5 MG/1
5 TABLET ORAL DAILY
Status: DISCONTINUED | OUTPATIENT
Start: 2019-01-01 | End: 2019-01-01

## 2019-01-01 RX ORDER — KETAMINE HCL IN NACL, ISO-OSM 100MG/10ML
SYRINGE (ML) INJECTION
Status: DISCONTINUED
Start: 2019-01-01 | End: 2019-01-01

## 2019-01-01 RX ORDER — LIDOCAINE 4 G/G
1 PATCH TOPICAL DAILY
DISCHARGE
Start: 2019-01-01

## 2019-01-01 RX ORDER — LOSARTAN POTASSIUM 100 MG/1
100 TABLET ORAL DAILY
Status: DISCONTINUED | OUTPATIENT
Start: 2019-01-01 | End: 2019-01-01 | Stop reason: HOSPADM

## 2019-01-01 RX ORDER — PROPOFOL 10 MG/ML
10 INJECTION, EMULSION INTRAVENOUS
Status: DISCONTINUED | OUTPATIENT
Start: 2019-01-01 | End: 2019-01-01

## 2019-01-01 RX ORDER — BISACODYL 10 MG
10 SUPPOSITORY, RECTAL RECTAL ONCE
Status: DISCONTINUED | OUTPATIENT
Start: 2019-01-01 | End: 2019-01-01

## 2019-01-01 RX ORDER — GABAPENTIN 250 MG/5ML
50 SOLUTION ORAL 3 TIMES DAILY
Status: DISCONTINUED | OUTPATIENT
Start: 2019-01-01 | End: 2019-01-01 | Stop reason: CLARIF

## 2019-01-01 RX ORDER — METHYLPREDNISOLONE ACETATE 80 MG/ML
80 INJECTION, SUSPENSION INTRA-ARTICULAR; INTRALESIONAL; INTRAMUSCULAR; SOFT TISSUE ONCE
Status: COMPLETED | OUTPATIENT
Start: 2019-01-01 | End: 2019-01-01

## 2019-01-01 RX ORDER — SODIUM BICARBONATE 650 MG/1
650 TABLET ORAL 2 TIMES DAILY
Status: DISCONTINUED | OUTPATIENT
Start: 2019-01-01 | End: 2019-01-01

## 2019-01-01 RX ORDER — SODIUM POLYSTYRENE SULFONATE 15 G/60ML
30 SUSPENSION ORAL; RECTAL ONCE
Status: COMPLETED | OUTPATIENT
Start: 2019-01-01 | End: 2019-01-01

## 2019-01-01 RX ORDER — FUROSEMIDE 40 MG/1
40 TABLET ORAL DAILY
Status: ON HOLD | COMMUNITY
End: 2019-01-01 | Stop reason: HOSPADM

## 2019-01-01 RX ORDER — DOCUSATE SODIUM 100 MG/1
100 CAPSULE, LIQUID FILLED ORAL 2 TIMES DAILY
Status: DISCONTINUED | OUTPATIENT
Start: 2019-01-01 | End: 2019-01-01

## 2019-01-01 RX ORDER — HEPARIN SODIUM 1000 [USP'U]/ML
4000 INJECTION, SOLUTION INTRAVENOUS; SUBCUTANEOUS PRN
Status: DISCONTINUED | OUTPATIENT
Start: 2019-01-01 | End: 2019-01-01

## 2019-01-01 RX ORDER — ENALAPRILAT 2.5 MG/2ML
0.62 INJECTION INTRAVENOUS EVERY 6 HOURS SCHEDULED
Status: DISCONTINUED | OUTPATIENT
Start: 2019-01-01 | End: 2019-01-01

## 2019-01-01 RX ORDER — POLYETHYLENE GLYCOL 3350 17 G/17G
17 POWDER, FOR SOLUTION ORAL DAILY
Status: DISCONTINUED | OUTPATIENT
Start: 2019-01-01 | End: 2019-01-01 | Stop reason: HOSPADM

## 2019-01-01 RX ORDER — LIDOCAINE 4 G/G
3 PATCH TOPICAL DAILY
Status: DISCONTINUED | OUTPATIENT
Start: 2019-01-01 | End: 2019-01-01 | Stop reason: HOSPADM

## 2019-01-01 RX ORDER — GABAPENTIN 100 MG/1
200 CAPSULE ORAL 2 TIMES DAILY
COMMUNITY

## 2019-01-01 RX ORDER — GABAPENTIN 100 MG/1
200 CAPSULE ORAL 2 TIMES DAILY
Status: DISCONTINUED | OUTPATIENT
Start: 2019-01-01 | End: 2019-01-01 | Stop reason: HOSPADM

## 2019-01-01 RX ORDER — METOPROLOL SUCCINATE 25 MG/1
25 TABLET, EXTENDED RELEASE ORAL DAILY
Status: DISCONTINUED | OUTPATIENT
Start: 2019-01-01 | End: 2019-01-01

## 2019-01-01 RX ORDER — INSULIN GLARGINE 100 [IU]/ML
6 INJECTION, SOLUTION SUBCUTANEOUS NIGHTLY
Status: DISCONTINUED | OUTPATIENT
Start: 2019-01-01 | End: 2019-01-01

## 2019-01-01 RX ORDER — FENTANYL CITRATE 50 UG/ML
25 INJECTION, SOLUTION INTRAMUSCULAR; INTRAVENOUS ONCE
Status: COMPLETED | OUTPATIENT
Start: 2019-01-01 | End: 2019-01-01

## 2019-01-01 RX ORDER — SODIUM CHLORIDE 0.9 % (FLUSH) 0.9 %
10 SYRINGE (ML) INJECTION PRN
Status: CANCELLED | OUTPATIENT
Start: 2019-01-01

## 2019-01-01 RX ORDER — PANTOPRAZOLE SODIUM 40 MG/1
40 TABLET, DELAYED RELEASE ORAL
Status: DISCONTINUED | OUTPATIENT
Start: 2019-01-01 | End: 2019-01-01 | Stop reason: HOSPADM

## 2019-01-01 RX ORDER — ALLOPURINOL 300 MG/1
300 TABLET ORAL DAILY
Qty: 90 TABLET | Refills: 3 | Status: SHIPPED | OUTPATIENT
Start: 2019-01-01

## 2019-01-01 RX ORDER — HEPARIN SODIUM 5000 [USP'U]/ML
5000 INJECTION, SOLUTION INTRAVENOUS; SUBCUTANEOUS EVERY 8 HOURS
Status: DISCONTINUED | OUTPATIENT
Start: 2019-01-01 | End: 2019-01-01 | Stop reason: HOSPADM

## 2019-01-01 RX ORDER — ONDANSETRON 2 MG/ML
4 INJECTION INTRAMUSCULAR; INTRAVENOUS EVERY 6 HOURS PRN
Status: DISCONTINUED | OUTPATIENT
Start: 2019-01-01 | End: 2019-01-01 | Stop reason: HOSPADM

## 2019-01-01 RX ORDER — GABAPENTIN 250 MG/5ML
50 SOLUTION ORAL 3 TIMES DAILY
Status: DISCONTINUED | OUTPATIENT
Start: 2019-01-01 | End: 2019-01-01

## 2019-01-01 RX ORDER — HYDRALAZINE HYDROCHLORIDE 25 MG/1
25 TABLET, FILM COATED ORAL 3 TIMES DAILY
Status: DISCONTINUED | OUTPATIENT
Start: 2019-01-01 | End: 2019-01-01

## 2019-01-01 RX ORDER — SENNA PLUS 8.6 MG/1
1 TABLET ORAL NIGHTLY
Status: DISCONTINUED | OUTPATIENT
Start: 2019-01-01 | End: 2019-01-01 | Stop reason: HOSPADM

## 2019-01-01 RX ORDER — GABAPENTIN 100 MG/1
50 CAPSULE ORAL 3 TIMES DAILY
Status: DISCONTINUED | OUTPATIENT
Start: 2019-01-01 | End: 2019-01-01 | Stop reason: ALTCHOICE

## 2019-01-01 RX ORDER — DIAZEPAM 5 MG/1
5 TABLET ORAL EVERY 12 HOURS
Status: DISCONTINUED | OUTPATIENT
Start: 2019-01-01 | End: 2019-01-01

## 2019-01-01 RX ORDER — ALLOPURINOL 100 MG/1
100 TABLET ORAL DAILY
Status: DISCONTINUED | OUTPATIENT
Start: 2019-01-01 | End: 2019-01-01

## 2019-01-01 RX ORDER — 0.9 % SODIUM CHLORIDE 0.9 %
1000 INTRAVENOUS SOLUTION INTRAVENOUS ONCE
Status: COMPLETED | OUTPATIENT
Start: 2019-01-01 | End: 2019-01-01

## 2019-01-01 RX ORDER — ONDANSETRON 2 MG/ML
4 INJECTION INTRAMUSCULAR; INTRAVENOUS ONCE
Status: COMPLETED | OUTPATIENT
Start: 2019-01-01 | End: 2019-01-01

## 2019-01-01 RX ORDER — LIDOCAINE 4 G/G
2 PATCH TOPICAL DAILY
Status: DISCONTINUED | OUTPATIENT
Start: 2019-01-01 | End: 2019-01-01 | Stop reason: HOSPADM

## 2019-01-01 RX ORDER — MAGNESIUM SULFATE IN WATER 40 MG/ML
2 INJECTION, SOLUTION INTRAVENOUS ONCE
Status: COMPLETED | OUTPATIENT
Start: 2019-01-01 | End: 2019-01-01

## 2019-01-01 RX ORDER — HYDRALAZINE HYDROCHLORIDE 50 MG/1
50 TABLET, FILM COATED ORAL EVERY 8 HOURS SCHEDULED
Status: CANCELLED | OUTPATIENT
Start: 2019-01-01

## 2019-01-01 RX ORDER — RANITIDINE 150 MG/1
TABLET ORAL
Refills: 1 | COMMUNITY
Start: 2019-01-01 | End: 2019-01-01 | Stop reason: ALTCHOICE

## 2019-01-01 RX ORDER — NICOTINE POLACRILEX 4 MG
15 LOZENGE BUCCAL PRN
Status: DISCONTINUED | OUTPATIENT
Start: 2019-01-01 | End: 2019-01-01 | Stop reason: HOSPADM

## 2019-01-01 RX ORDER — DEXAMETHASONE SODIUM PHOSPHATE 4 MG/ML
2 INJECTION, SOLUTION INTRA-ARTICULAR; INTRALESIONAL; INTRAMUSCULAR; INTRAVENOUS; SOFT TISSUE EVERY 8 HOURS
Status: DISCONTINUED | OUTPATIENT
Start: 2019-01-01 | End: 2019-01-01

## 2019-01-01 RX ORDER — DEXAMETHASONE SODIUM PHOSPHATE 4 MG/ML
4 INJECTION, SOLUTION INTRA-ARTICULAR; INTRALESIONAL; INTRAMUSCULAR; INTRAVENOUS; SOFT TISSUE EVERY 6 HOURS
Status: DISCONTINUED | OUTPATIENT
Start: 2019-01-01 | End: 2019-01-01

## 2019-01-01 RX ORDER — ALLOPURINOL 300 MG/1
150 TABLET ORAL DAILY
Qty: 30 TABLET | Refills: 3 | Status: SHIPPED
Start: 2019-01-01 | End: 2019-01-01 | Stop reason: SDUPTHER

## 2019-01-01 RX ORDER — ACETAMINOPHEN 325 MG/1
650 TABLET ORAL 4 TIMES DAILY
Status: DISCONTINUED | OUTPATIENT
Start: 2019-01-01 | End: 2019-01-01

## 2019-01-01 RX ORDER — LOSARTAN POTASSIUM 100 MG/1
100 TABLET ORAL DAILY
Status: DISCONTINUED | OUTPATIENT
Start: 2019-01-01 | End: 2019-01-01

## 2019-01-01 RX ORDER — HEPARIN SODIUM 5000 [USP'U]/ML
5000 INJECTION, SOLUTION INTRAVENOUS; SUBCUTANEOUS EVERY 8 HOURS SCHEDULED
Status: DISCONTINUED | OUTPATIENT
Start: 2019-01-01 | End: 2019-01-01

## 2019-01-01 RX ORDER — BUPIVACAINE HYDROCHLORIDE 2.5 MG/ML
5 INJECTION, SOLUTION EPIDURAL; INFILTRATION; INTRACAUDAL ONCE
Status: CANCELLED | OUTPATIENT
Start: 2019-01-01 | End: 2019-01-01

## 2019-01-01 RX ORDER — LIDOCAINE 4 G/G
3 PATCH TOPICAL DAILY
DISCHARGE
Start: 2019-01-01

## 2019-01-01 RX ORDER — SODIUM CHLORIDE FOR INHALATION 3 %
4 VIAL, NEBULIZER (ML) INHALATION 2 TIMES DAILY
Status: DISCONTINUED | OUTPATIENT
Start: 2019-01-01 | End: 2019-01-01 | Stop reason: HOSPADM

## 2019-01-01 RX ORDER — LEVOTHYROXINE SODIUM 112 UG/1
112 TABLET ORAL DAILY
Status: DISCONTINUED | OUTPATIENT
Start: 2019-01-01 | End: 2019-01-01 | Stop reason: HOSPADM

## 2019-01-01 RX ORDER — DIAZEPAM 2 MG/1
2 TABLET ORAL EVERY 12 HOURS
Status: DISCONTINUED | OUTPATIENT
Start: 2019-01-01 | End: 2019-01-01

## 2019-01-01 RX ORDER — DEXTROSE MONOHYDRATE 50 MG/ML
100 INJECTION, SOLUTION INTRAVENOUS PRN
Status: DISCONTINUED | OUTPATIENT
Start: 2019-01-01 | End: 2019-01-01 | Stop reason: HOSPADM

## 2019-01-01 RX ORDER — DEXAMETHASONE SODIUM PHOSPHATE 4 MG/ML
2 INJECTION, SOLUTION INTRA-ARTICULAR; INTRALESIONAL; INTRAMUSCULAR; INTRAVENOUS; SOFT TISSUE EVERY 12 HOURS
Status: DISCONTINUED | OUTPATIENT
Start: 2019-01-01 | End: 2019-01-01

## 2019-01-01 RX ORDER — ESOMEPRAZOLE MAGNESIUM 20 MG/1
20 FOR SUSPENSION ORAL DAILY
COMMUNITY
End: 2019-01-01 | Stop reason: ALTCHOICE

## 2019-01-01 RX ORDER — INSULIN GLARGINE 100 [IU]/ML
4 INJECTION, SOLUTION SUBCUTANEOUS NIGHTLY
Status: DISCONTINUED | OUTPATIENT
Start: 2019-01-01 | End: 2019-01-01

## 2019-01-01 RX ORDER — GABAPENTIN 600 MG/1
300 TABLET ORAL 3 TIMES DAILY
Status: DISCONTINUED | OUTPATIENT
Start: 2019-01-01 | End: 2019-01-01

## 2019-01-01 RX ORDER — DEXAMETHASONE SODIUM PHOSPHATE 4 MG/ML
2 INJECTION, SOLUTION INTRA-ARTICULAR; INTRALESIONAL; INTRAMUSCULAR; INTRAVENOUS; SOFT TISSUE EVERY 6 HOURS
Status: DISCONTINUED | OUTPATIENT
Start: 2019-01-01 | End: 2019-01-01

## 2019-01-01 RX ORDER — LORAZEPAM 2 MG/ML
1 INJECTION INTRAMUSCULAR ONCE
Status: COMPLETED | OUTPATIENT
Start: 2019-01-01 | End: 2019-01-01

## 2019-01-01 RX ORDER — FENTANYL CITRATE 50 UG/ML
25 INJECTION, SOLUTION INTRAMUSCULAR; INTRAVENOUS
Status: DISCONTINUED | OUTPATIENT
Start: 2019-01-01 | End: 2019-01-01 | Stop reason: HOSPADM

## 2019-01-01 RX ORDER — KETAMINE HCL IN NACL, ISO-OSM 100MG/10ML
SYRINGE (ML) INJECTION
Status: COMPLETED
Start: 2019-01-01 | End: 2019-01-01

## 2019-01-01 RX ADMIN — ISOSORBIDE MONONITRATE 60 MG: 60 TABLET, EXTENDED RELEASE ORAL at 09:49

## 2019-01-01 RX ADMIN — Medication 10 ML: at 08:15

## 2019-01-01 RX ADMIN — SODIUM CHLORIDE SOLN NEBU 3% 4 ML: 3 NEBU SOLN at 22:18

## 2019-01-01 RX ADMIN — OXYBUTYNIN CHLORIDE 5 MG: 5 TABLET ORAL at 20:14

## 2019-01-01 RX ADMIN — ATORVASTATIN CALCIUM 20 MG: 20 TABLET, FILM COATED ORAL at 21:37

## 2019-01-01 RX ADMIN — DEXAMETHASONE SODIUM PHOSPHATE 4 MG: 4 INJECTION, SOLUTION INTRAMUSCULAR; INTRAVENOUS at 20:51

## 2019-01-01 RX ADMIN — DOXYCYCLINE 100 MG: 100 INJECTION, POWDER, LYOPHILIZED, FOR SOLUTION INTRAVENOUS at 06:56

## 2019-01-01 RX ADMIN — ISOSORBIDE MONONITRATE 60 MG: 60 TABLET, EXTENDED RELEASE ORAL at 20:48

## 2019-01-01 RX ADMIN — LOSARTAN POTASSIUM 100 MG: 100 TABLET, FILM COATED ORAL at 09:35

## 2019-01-01 RX ADMIN — DOXYCYCLINE 100 MG: 100 INJECTION, POWDER, LYOPHILIZED, FOR SOLUTION INTRAVENOUS at 06:21

## 2019-01-01 RX ADMIN — FUROSEMIDE 40 MG: 40 TABLET ORAL at 20:51

## 2019-01-01 RX ADMIN — INSULIN GLARGINE 4 UNITS: 100 INJECTION, SOLUTION SUBCUTANEOUS at 21:26

## 2019-01-01 RX ADMIN — MICONAZOLE NITRATE: 20 POWDER TOPICAL at 09:18

## 2019-01-01 RX ADMIN — HYDRALAZINE HYDROCHLORIDE 50 MG: 50 TABLET ORAL at 20:43

## 2019-01-01 RX ADMIN — HYDRALAZINE HYDROCHLORIDE 10 MG: 20 INJECTION, SOLUTION INTRAMUSCULAR; INTRAVENOUS at 22:06

## 2019-01-01 RX ADMIN — ACETAMINOPHEN 1000 MG: 500 TABLET ORAL at 16:37

## 2019-01-01 RX ADMIN — ALBUTEROL SULFATE 2.5 MG: 2.5 SOLUTION RESPIRATORY (INHALATION) at 18:00

## 2019-01-01 RX ADMIN — METHYLPREDNISOLONE SODIUM SUCCINATE 40 MG: 40 INJECTION, POWDER, FOR SOLUTION INTRAMUSCULAR; INTRAVENOUS at 12:57

## 2019-01-01 RX ADMIN — Medication 10 ML: at 09:49

## 2019-01-01 RX ADMIN — DOCUSATE SODIUM 100 MG: 100 CAPSULE, LIQUID FILLED ORAL at 10:23

## 2019-01-01 RX ADMIN — DOCUSATE SODIUM 100 MG: 100 CAPSULE, LIQUID FILLED ORAL at 21:01

## 2019-01-01 RX ADMIN — ALLOPURINOL 100 MG: 100 TABLET ORAL at 09:10

## 2019-01-01 RX ADMIN — LEVOTHYROXINE SODIUM 112 MCG: 112 TABLET ORAL at 09:38

## 2019-01-01 RX ADMIN — FENTANYL CITRATE 25 MCG: 50 INJECTION INTRAMUSCULAR; INTRAVENOUS at 20:18

## 2019-01-01 RX ADMIN — POLYETHYLENE GLYCOL (3350) 17 G: 17 POWDER, FOR SOLUTION ORAL at 09:32

## 2019-01-01 RX ADMIN — LACTULOSE 20 G: 20 SOLUTION ORAL at 13:02

## 2019-01-01 RX ADMIN — METHYLPREDNISOLONE SODIUM SUCCINATE 40 MG: 40 INJECTION, POWDER, FOR SOLUTION INTRAMUSCULAR; INTRAVENOUS at 21:20

## 2019-01-01 RX ADMIN — METHYLPREDNISOLONE SODIUM SUCCINATE 40 MG: 40 INJECTION, POWDER, FOR SOLUTION INTRAMUSCULAR; INTRAVENOUS at 12:58

## 2019-01-01 RX ADMIN — Medication 0.2 MG: at 07:44

## 2019-01-01 RX ADMIN — Medication 100 MG: at 11:32

## 2019-01-01 RX ADMIN — INSULIN GLARGINE 7 UNITS: 100 INJECTION, SOLUTION SUBCUTANEOUS at 22:07

## 2019-01-01 RX ADMIN — LEVOTHYROXINE SODIUM 112 MCG: 112 TABLET ORAL at 06:09

## 2019-01-01 RX ADMIN — DEXTROSE AND SODIUM CHLORIDE: 5; 450 INJECTION, SOLUTION INTRAVENOUS at 06:55

## 2019-01-01 RX ADMIN — INSULIN LISPRO 4 UNITS: 100 INJECTION, SOLUTION INTRAVENOUS; SUBCUTANEOUS at 09:50

## 2019-01-01 RX ADMIN — SODIUM CHLORIDE: 9 INJECTION, SOLUTION INTRAVENOUS at 22:05

## 2019-01-01 RX ADMIN — HYDRALAZINE HYDROCHLORIDE 10 MG: 20 INJECTION, SOLUTION INTRAMUSCULAR; INTRAVENOUS at 08:16

## 2019-01-01 RX ADMIN — DEXAMETHASONE SODIUM PHOSPHATE 2 MG: 4 INJECTION, SOLUTION INTRAMUSCULAR; INTRAVENOUS at 06:09

## 2019-01-01 RX ADMIN — OXYCODONE HYDROCHLORIDE 10 MG: 5 TABLET ORAL at 18:31

## 2019-01-01 RX ADMIN — ACETAMINOPHEN 1000 MG: 500 TABLET ORAL at 09:37

## 2019-01-01 RX ADMIN — HYDRALAZINE HYDROCHLORIDE 50 MG: 50 TABLET ORAL at 05:51

## 2019-01-01 RX ADMIN — SODIUM CHLORIDE 1000 ML: 9 INJECTION, SOLUTION INTRAVENOUS at 15:31

## 2019-01-01 RX ADMIN — HEPARIN SODIUM 5000 UNITS: 5000 INJECTION INTRAVENOUS; SUBCUTANEOUS at 23:05

## 2019-01-01 RX ADMIN — HYDRALAZINE HYDROCHLORIDE 75 MG: 50 TABLET, FILM COATED ORAL at 05:41

## 2019-01-01 RX ADMIN — GLYCOPYRROLATE AND FORMOTEROL FUMARATE 2 PUFF: 9; 4.8 AEROSOL, METERED RESPIRATORY (INHALATION) at 10:15

## 2019-01-01 RX ADMIN — INSULIN LISPRO 6 UNITS: 100 INJECTION, SOLUTION INTRAVENOUS; SUBCUTANEOUS at 13:54

## 2019-01-01 RX ADMIN — FENTANYL CITRATE 25 MCG: 50 INJECTION INTRAMUSCULAR; INTRAVENOUS at 16:41

## 2019-01-01 RX ADMIN — INSULIN LISPRO 2 UNITS: 100 INJECTION, SOLUTION INTRAVENOUS; SUBCUTANEOUS at 17:38

## 2019-01-01 RX ADMIN — CLOPIDOGREL BISULFATE 75 MG: 75 TABLET ORAL at 20:47

## 2019-01-01 RX ADMIN — DOXYCYCLINE 100 MG: 100 INJECTION, POWDER, LYOPHILIZED, FOR SOLUTION INTRAVENOUS at 17:05

## 2019-01-01 RX ADMIN — LIDOCAINE HYDROCHLORIDE 100 MG: 20 INJECTION, SOLUTION INFILTRATION; PERINEURAL at 07:36

## 2019-01-01 RX ADMIN — INSULIN LISPRO 4 UNITS: 100 INJECTION, SOLUTION INTRAVENOUS; SUBCUTANEOUS at 00:34

## 2019-01-01 RX ADMIN — GLYCOPYRROLATE AND FORMOTEROL FUMARATE 2 PUFF: 9; 4.8 AEROSOL, METERED RESPIRATORY (INHALATION) at 08:29

## 2019-01-01 RX ADMIN — ACETAMINOPHEN 1000 MG: 500 TABLET ORAL at 13:07

## 2019-01-01 RX ADMIN — GLYCOPYRROLATE AND FORMOTEROL FUMARATE 2 PUFF: 9; 4.8 AEROSOL, METERED RESPIRATORY (INHALATION) at 17:56

## 2019-01-01 RX ADMIN — PANTOPRAZOLE SODIUM 40 MG: 40 TABLET, DELAYED RELEASE ORAL at 09:41

## 2019-01-01 RX ADMIN — DEXAMETHASONE SODIUM PHOSPHATE 2 MG: 4 INJECTION, SOLUTION INTRAMUSCULAR; INTRAVENOUS at 13:13

## 2019-01-01 RX ADMIN — DOXYCYCLINE 100 MG: 100 INJECTION, POWDER, LYOPHILIZED, FOR SOLUTION INTRAVENOUS at 05:51

## 2019-01-01 RX ADMIN — ACETAMINOPHEN 1000 MG: 500 TABLET ORAL at 20:07

## 2019-01-01 RX ADMIN — HEPARIN SODIUM 5000 UNITS: 5000 INJECTION INTRAVENOUS; SUBCUTANEOUS at 16:57

## 2019-01-01 RX ADMIN — IPRATROPIUM BROMIDE AND ALBUTEROL SULFATE 1 AMPULE: .5; 3 SOLUTION RESPIRATORY (INHALATION) at 07:38

## 2019-01-01 RX ADMIN — DEXAMETHASONE SODIUM PHOSPHATE 4 MG: 4 INJECTION, SOLUTION INTRAMUSCULAR; INTRAVENOUS at 08:17

## 2019-01-01 RX ADMIN — FUROSEMIDE 40 MG: 40 TABLET ORAL at 13:33

## 2019-01-01 RX ADMIN — CLOPIDOGREL BISULFATE 75 MG: 75 TABLET ORAL at 08:36

## 2019-01-01 RX ADMIN — ALBUTEROL SULFATE 2.5 MG: 2.5 SOLUTION RESPIRATORY (INHALATION) at 13:44

## 2019-01-01 RX ADMIN — ACETAMINOPHEN 1000 MG: 500 TABLET ORAL at 20:42

## 2019-01-01 RX ADMIN — INSULIN LISPRO 9 UNITS: 100 INJECTION, SOLUTION INTRAVENOUS; SUBCUTANEOUS at 09:27

## 2019-01-01 RX ADMIN — HYDRALAZINE HYDROCHLORIDE 100 MG: 50 TABLET, FILM COATED ORAL at 14:08

## 2019-01-01 RX ADMIN — ALLOPURINOL 300 MG: 300 TABLET ORAL at 09:38

## 2019-01-01 RX ADMIN — GABAPENTIN 100 MG: 100 CAPSULE ORAL at 21:03

## 2019-01-01 RX ADMIN — DEXAMETHASONE SODIUM PHOSPHATE 2 MG: 4 INJECTION, SOLUTION INTRAMUSCULAR; INTRAVENOUS at 21:36

## 2019-01-01 RX ADMIN — Medication 50 MG: at 09:19

## 2019-01-01 RX ADMIN — PIPERACILLIN AND TAZOBACTAM 2.25 G: 2; .25 INJECTION, POWDER, LYOPHILIZED, FOR SOLUTION INTRAVENOUS at 03:53

## 2019-01-01 RX ADMIN — SODIUM CHLORIDE SOLN NEBU 3% 4 ML: 3 NEBU SOLN at 08:23

## 2019-01-01 RX ADMIN — MORPHINE SULFATE 2 MG: 2 INJECTION, SOLUTION INTRAMUSCULAR; INTRAVENOUS at 16:48

## 2019-01-01 RX ADMIN — LACTULOSE 20 G: 20 SOLUTION ORAL at 14:57

## 2019-01-01 RX ADMIN — PIPERACILLIN AND TAZOBACTAM 2.25 G: 2; .25 INJECTION, POWDER, LYOPHILIZED, FOR SOLUTION INTRAVENOUS at 12:53

## 2019-01-01 RX ADMIN — HEPARIN SODIUM 5000 UNITS: 5000 INJECTION INTRAVENOUS; SUBCUTANEOUS at 14:43

## 2019-01-01 RX ADMIN — ATORVASTATIN CALCIUM 20 MG: 20 TABLET, FILM COATED ORAL at 20:21

## 2019-01-01 RX ADMIN — INSULIN LISPRO 6 UNITS: 100 INJECTION, SOLUTION INTRAVENOUS; SUBCUTANEOUS at 13:09

## 2019-01-01 RX ADMIN — HYDRALAZINE HYDROCHLORIDE 50 MG: 50 TABLET ORAL at 23:19

## 2019-01-01 RX ADMIN — DEXTROSE AND SODIUM CHLORIDE: 5; 450 INJECTION, SOLUTION INTRAVENOUS at 20:58

## 2019-01-01 RX ADMIN — GLYCOPYRROLATE AND FORMOTEROL FUMARATE 2 PUFF: 9; 4.8 AEROSOL, METERED RESPIRATORY (INHALATION) at 10:52

## 2019-01-01 RX ADMIN — METOPROLOL SUCCINATE 25 MG: 25 TABLET, FILM COATED, EXTENDED RELEASE ORAL at 19:20

## 2019-01-01 RX ADMIN — INSULIN LISPRO 3 UNITS: 100 INJECTION, SOLUTION INTRAVENOUS; SUBCUTANEOUS at 12:59

## 2019-01-01 RX ADMIN — OXYCODONE HYDROCHLORIDE 5 MG: 5 TABLET ORAL at 16:01

## 2019-01-01 RX ADMIN — HEPARIN SODIUM 11 UNITS/KG/HR: 10000 INJECTION, SOLUTION INTRAVENOUS at 00:12

## 2019-01-01 RX ADMIN — ACETAMINOPHEN 1000 MG: 500 TABLET ORAL at 12:58

## 2019-01-01 RX ADMIN — OXYBUTYNIN CHLORIDE 5 MG: 5 TABLET ORAL at 11:21

## 2019-01-01 RX ADMIN — HYDRALAZINE HYDROCHLORIDE 100 MG: 50 TABLET, FILM COATED ORAL at 21:03

## 2019-01-01 RX ADMIN — INSULIN LISPRO 2 UNITS: 100 INJECTION, SOLUTION INTRAVENOUS; SUBCUTANEOUS at 01:32

## 2019-01-01 RX ADMIN — HYDRALAZINE HYDROCHLORIDE 100 MG: 50 TABLET, FILM COATED ORAL at 13:41

## 2019-01-01 RX ADMIN — ALBUTEROL SULFATE 2.5 MG: 2.5 SOLUTION RESPIRATORY (INHALATION) at 14:12

## 2019-01-01 RX ADMIN — ACETAMINOPHEN 1000 MG: 500 TABLET ORAL at 03:07

## 2019-01-01 RX ADMIN — PIPERACILLIN AND TAZOBACTAM 2.25 G: 2; .25 INJECTION, POWDER, LYOPHILIZED, FOR SOLUTION INTRAVENOUS at 02:31

## 2019-01-01 RX ADMIN — MICONAZOLE NITRATE: 20 POWDER TOPICAL at 20:15

## 2019-01-01 RX ADMIN — INSULIN LISPRO 4 UNITS: 100 INJECTION, SOLUTION INTRAVENOUS; SUBCUTANEOUS at 18:30

## 2019-01-01 RX ADMIN — ENALAPRILAT 0.62 MG: 2.5 INJECTION INTRAVENOUS at 23:31

## 2019-01-01 RX ADMIN — GLYCOPYRROLATE AND FORMOTEROL FUMARATE 2 PUFF: 9; 4.8 AEROSOL, METERED RESPIRATORY (INHALATION) at 22:19

## 2019-01-01 RX ADMIN — SODIUM CHLORIDE: 4.5 INJECTION, SOLUTION INTRAVENOUS at 07:23

## 2019-01-01 RX ADMIN — ACETAMINOPHEN 1000 MG: 500 TABLET ORAL at 02:11

## 2019-01-01 RX ADMIN — ALLOPURINOL 300 MG: 300 TABLET ORAL at 09:32

## 2019-01-01 RX ADMIN — OMEPRAZOLE 40 MG: 40 CAPSULE, DELAYED RELEASE ORAL at 05:37

## 2019-01-01 RX ADMIN — Medication 50 MG: at 21:16

## 2019-01-01 RX ADMIN — HEPARIN SODIUM 11 UNITS/KG/HR: 10000 INJECTION, SOLUTION INTRAVENOUS at 03:50

## 2019-01-01 RX ADMIN — OXYBUTYNIN CHLORIDE 5 MG: 5 TABLET ORAL at 21:36

## 2019-01-01 RX ADMIN — IPRATROPIUM BROMIDE AND ALBUTEROL SULFATE 1 AMPULE: .5; 3 SOLUTION RESPIRATORY (INHALATION) at 15:42

## 2019-01-01 RX ADMIN — DOCUSATE SODIUM 100 MG: 100 CAPSULE, LIQUID FILLED ORAL at 10:22

## 2019-01-01 RX ADMIN — OXYBUTYNIN CHLORIDE 5 MG: 5 TABLET ORAL at 22:14

## 2019-01-01 RX ADMIN — LOSARTAN POTASSIUM 100 MG: 100 TABLET, FILM COATED ORAL at 13:33

## 2019-01-01 RX ADMIN — OXYBUTYNIN CHLORIDE 5 MG: 5 TABLET ORAL at 11:00

## 2019-01-01 RX ADMIN — METOPROLOL SUCCINATE 25 MG: 25 TABLET, FILM COATED, EXTENDED RELEASE ORAL at 08:49

## 2019-01-01 RX ADMIN — DEXAMETHASONE SODIUM PHOSPHATE 2 MG: 4 INJECTION, SOLUTION INTRAMUSCULAR; INTRAVENOUS at 18:00

## 2019-01-01 RX ADMIN — METOPROLOL SUCCINATE 50 MG: 50 TABLET, EXTENDED RELEASE ORAL at 10:15

## 2019-01-01 RX ADMIN — PROPOFOL 100 MG: 10 INJECTION, EMULSION INTRAVENOUS at 07:36

## 2019-01-01 RX ADMIN — DARBEPOETIN ALFA 100 MCG: 100 INJECTION, SOLUTION INTRAVENOUS; SUBCUTANEOUS at 12:58

## 2019-01-01 RX ADMIN — DOXYCYCLINE 100 MG: 100 INJECTION, POWDER, LYOPHILIZED, FOR SOLUTION INTRAVENOUS at 18:14

## 2019-01-01 RX ADMIN — OXYCODONE HYDROCHLORIDE 10 MG: 5 TABLET ORAL at 09:26

## 2019-01-01 RX ADMIN — SODIUM BICARBONATE 1300 MG: 650 TABLET ORAL at 08:49

## 2019-01-01 RX ADMIN — INSULIN LISPRO 4 UNITS: 100 INJECTION, SOLUTION INTRAVENOUS; SUBCUTANEOUS at 12:06

## 2019-01-01 RX ADMIN — DIAZEPAM 5 MG: 5 TABLET ORAL at 09:10

## 2019-01-01 RX ADMIN — SODIUM CHLORIDE: 9 INJECTION, SOLUTION INTRAVENOUS at 20:09

## 2019-01-01 RX ADMIN — DOCUSATE SODIUM 100 MG: 100 CAPSULE, LIQUID FILLED ORAL at 21:31

## 2019-01-01 RX ADMIN — HYDRALAZINE HYDROCHLORIDE 100 MG: 50 TABLET, FILM COATED ORAL at 10:58

## 2019-01-01 RX ADMIN — HEPARIN SODIUM 5000 UNITS: 5000 INJECTION INTRAVENOUS; SUBCUTANEOUS at 14:07

## 2019-01-01 RX ADMIN — OXYBUTYNIN CHLORIDE 5 MG: 5 TABLET ORAL at 08:49

## 2019-01-01 RX ADMIN — HYDRALAZINE HYDROCHLORIDE 25 MG: 25 TABLET ORAL at 10:08

## 2019-01-01 RX ADMIN — DIAZEPAM 5 MG: 5 TABLET ORAL at 09:32

## 2019-01-01 RX ADMIN — DIAZEPAM 5 MG: 5 TABLET ORAL at 10:22

## 2019-01-01 RX ADMIN — ENALAPRILAT 0.62 MG: 2.5 INJECTION INTRAVENOUS at 06:57

## 2019-01-01 RX ADMIN — INSULIN LISPRO 3 UNITS: 100 INJECTION, SOLUTION INTRAVENOUS; SUBCUTANEOUS at 09:43

## 2019-01-01 RX ADMIN — METOPROLOL SUCCINATE 50 MG: 50 TABLET, EXTENDED RELEASE ORAL at 11:01

## 2019-01-01 RX ADMIN — Medication 50 MG: at 14:16

## 2019-01-01 RX ADMIN — ALLOPURINOL 100 MG: 100 TABLET ORAL at 15:50

## 2019-01-01 RX ADMIN — FENTANYL CITRATE 25 MCG: 50 INJECTION INTRAMUSCULAR; INTRAVENOUS at 16:58

## 2019-01-01 RX ADMIN — FENTANYL CITRATE 25 MCG: 50 INJECTION INTRAMUSCULAR; INTRAVENOUS at 14:43

## 2019-01-01 RX ADMIN — GABAPENTIN 100 MG: 100 CAPSULE ORAL at 14:53

## 2019-01-01 RX ADMIN — PROPOFOL 40 MCG/KG/MIN: 10 INJECTION, EMULSION INTRAVENOUS at 14:48

## 2019-01-01 RX ADMIN — LOSARTAN POTASSIUM 100 MG: 100 TABLET, FILM COATED ORAL at 09:39

## 2019-01-01 RX ADMIN — LEVOTHYROXINE SODIUM 112 MCG: 112 TABLET ORAL at 05:49

## 2019-01-01 RX ADMIN — FUROSEMIDE 40 MG: 40 TABLET ORAL at 09:37

## 2019-01-01 RX ADMIN — LACTULOSE 20 G: 20 SOLUTION ORAL at 10:23

## 2019-01-01 RX ADMIN — METHYLPREDNISOLONE SODIUM SUCCINATE 40 MG: 40 INJECTION, POWDER, FOR SOLUTION INTRAMUSCULAR; INTRAVENOUS at 04:53

## 2019-01-01 RX ADMIN — Medication 10 ML: at 20:24

## 2019-01-01 RX ADMIN — DEXAMETHASONE SODIUM PHOSPHATE 2 MG: 4 INJECTION, SOLUTION INTRAMUSCULAR; INTRAVENOUS at 05:00

## 2019-01-01 RX ADMIN — ALLOPURINOL 100 MG: 100 TABLET ORAL at 08:46

## 2019-01-01 RX ADMIN — PIPERACILLIN AND TAZOBACTAM 2.25 G: 2; .25 INJECTION, POWDER, LYOPHILIZED, FOR SOLUTION INTRAVENOUS at 19:57

## 2019-01-01 RX ADMIN — ACETAMINOPHEN 650 MG: 325 TABLET ORAL at 20:51

## 2019-01-01 RX ADMIN — SODIUM CHLORIDE: 9 INJECTION, SOLUTION INTRAVENOUS at 01:42

## 2019-01-01 RX ADMIN — PIPERACILLIN AND TAZOBACTAM 2.25 G: 2; .25 INJECTION, POWDER, LYOPHILIZED, FOR SOLUTION INTRAVENOUS at 11:04

## 2019-01-01 RX ADMIN — ATORVASTATIN CALCIUM 20 MG: 20 TABLET, FILM COATED ORAL at 20:11

## 2019-01-01 RX ADMIN — IPRATROPIUM BROMIDE AND ALBUTEROL SULFATE 1 AMPULE: .5; 3 SOLUTION RESPIRATORY (INHALATION) at 08:00

## 2019-01-01 RX ADMIN — HEPARIN SODIUM 5000 UNITS: 5000 INJECTION INTRAVENOUS; SUBCUTANEOUS at 05:31

## 2019-01-01 RX ADMIN — ACETAMINOPHEN 1000 MG: 500 TABLET ORAL at 03:55

## 2019-01-01 RX ADMIN — POLYETHYLENE GLYCOL (3350) 17 G: 17 POWDER, FOR SOLUTION ORAL at 09:26

## 2019-01-01 RX ADMIN — HYDRALAZINE HYDROCHLORIDE 100 MG: 50 TABLET, FILM COATED ORAL at 09:36

## 2019-01-01 RX ADMIN — GLYCOPYRROLATE AND FORMOTEROL FUMARATE 2 PUFF: 9; 4.8 AEROSOL, METERED RESPIRATORY (INHALATION) at 10:38

## 2019-01-01 RX ADMIN — ACETAMINOPHEN 1000 MG: 500 TABLET ORAL at 17:08

## 2019-01-01 RX ADMIN — HYDRALAZINE HYDROCHLORIDE 50 MG: 50 TABLET ORAL at 21:20

## 2019-01-01 RX ADMIN — LEVOTHYROXINE SODIUM 112 MCG: 112 TABLET ORAL at 05:51

## 2019-01-01 RX ADMIN — GABAPENTIN 200 MG: 100 CAPSULE ORAL at 10:51

## 2019-01-01 RX ADMIN — SODIUM CHLORIDE: 9 INJECTION, SOLUTION INTRAVENOUS at 05:39

## 2019-01-01 RX ADMIN — IRON SUCROSE 300 MG: 20 INJECTION, SOLUTION INTRAVENOUS at 13:00

## 2019-01-01 RX ADMIN — INSULIN LISPRO 6 UNITS: 100 INJECTION, SOLUTION INTRAVENOUS; SUBCUTANEOUS at 10:18

## 2019-01-01 RX ADMIN — SODIUM CHLORIDE SOLN NEBU 3% 4 ML: 3 NEBU SOLN at 21:46

## 2019-01-01 RX ADMIN — SENNOSIDES 8.6 MG: 8.6 TABLET, FILM COATED ORAL at 20:12

## 2019-01-01 RX ADMIN — ALBUTEROL SULFATE 2.5 MG: 2.5 SOLUTION RESPIRATORY (INHALATION) at 12:37

## 2019-01-01 RX ADMIN — DEXAMETHASONE SODIUM PHOSPHATE 4 MG: 4 INJECTION, SOLUTION INTRAMUSCULAR; INTRAVENOUS at 13:26

## 2019-01-01 RX ADMIN — INSULIN GLARGINE 15 UNITS: 100 INJECTION, SOLUTION SUBCUTANEOUS at 21:27

## 2019-01-01 RX ADMIN — NITROGLYCERIN 5 MCG/MIN: 20 INJECTION INTRAVENOUS at 01:59

## 2019-01-01 RX ADMIN — INSULIN LISPRO 2 UNITS: 100 INJECTION, SOLUTION INTRAVENOUS; SUBCUTANEOUS at 14:30

## 2019-01-01 RX ADMIN — Medication 10 ML: at 17:40

## 2019-01-01 RX ADMIN — OXYBUTYNIN CHLORIDE 5 MG: 5 TABLET ORAL at 08:36

## 2019-01-01 RX ADMIN — PIPERACILLIN AND TAZOBACTAM 2.25 G: 2; .25 INJECTION, POWDER, LYOPHILIZED, FOR SOLUTION INTRAVENOUS at 20:08

## 2019-01-01 RX ADMIN — INSULIN LISPRO 6 UNITS: 100 INJECTION, SOLUTION INTRAVENOUS; SUBCUTANEOUS at 00:27

## 2019-01-01 RX ADMIN — FAMOTIDINE 20 MG: 10 INJECTION, SOLUTION INTRAVENOUS at 21:15

## 2019-01-01 RX ADMIN — DOCUSATE SODIUM 100 MG: 100 CAPSULE, LIQUID FILLED ORAL at 09:37

## 2019-01-01 RX ADMIN — ALLOPURINOL 300 MG: 300 TABLET ORAL at 09:37

## 2019-01-01 RX ADMIN — IPRATROPIUM BROMIDE AND ALBUTEROL SULFATE 1 AMPULE: .5; 3 SOLUTION RESPIRATORY (INHALATION) at 12:25

## 2019-01-01 RX ADMIN — ALLOPURINOL 300 MG: 300 TABLET ORAL at 20:44

## 2019-01-01 RX ADMIN — Medication 10 ML: at 09:18

## 2019-01-01 RX ADMIN — DIAZEPAM 5 MG: 5 TABLET ORAL at 20:43

## 2019-01-01 RX ADMIN — PROPOFOL 30 MCG/KG/MIN: 10 INJECTION, EMULSION INTRAVENOUS at 21:24

## 2019-01-01 RX ADMIN — PIPERACILLIN AND TAZOBACTAM 2.25 G: 2; .25 INJECTION, POWDER, LYOPHILIZED, FOR SOLUTION INTRAVENOUS at 03:30

## 2019-01-01 RX ADMIN — ACETAMINOPHEN 1000 MG: 500 TABLET ORAL at 05:46

## 2019-01-01 RX ADMIN — POLYETHYLENE GLYCOL (3350) 17 G: 17 POWDER, FOR SOLUTION ORAL at 08:17

## 2019-01-01 RX ADMIN — BUPIVACAINE HYDROCHLORIDE 2 ML: 2.5 INJECTION, SOLUTION EPIDURAL; INFILTRATION; INTRACAUDAL; PERINEURAL at 10:05

## 2019-01-01 RX ADMIN — HEPARIN SODIUM 5000 UNITS: 5000 INJECTION INTRAVENOUS; SUBCUTANEOUS at 09:47

## 2019-01-01 RX ADMIN — DOCUSATE SODIUM 100 MG: 100 CAPSULE, LIQUID FILLED ORAL at 09:32

## 2019-01-01 RX ADMIN — FENTANYL CITRATE 25 MCG: 50 INJECTION INTRAMUSCULAR; INTRAVENOUS at 10:51

## 2019-01-01 RX ADMIN — DOXYCYCLINE 100 MG: 100 INJECTION, POWDER, LYOPHILIZED, FOR SOLUTION INTRAVENOUS at 06:28

## 2019-01-01 RX ADMIN — SODIUM CHLORIDE SOLN NEBU 3% 4 ML: 3 NEBU SOLN at 21:01

## 2019-01-01 RX ADMIN — ACETAMINOPHEN 1000 MG: 500 TABLET ORAL at 05:41

## 2019-01-01 RX ADMIN — LEVOTHYROXINE SODIUM 112 MCG: 112 TABLET ORAL at 05:00

## 2019-01-01 RX ADMIN — SENNOSIDES 8.6 MG: 8.6 TABLET, FILM COATED ORAL at 21:37

## 2019-01-01 RX ADMIN — HEPARIN SODIUM 11 UNITS/KG/HR: 10000 INJECTION, SOLUTION INTRAVENOUS at 05:14

## 2019-01-01 RX ADMIN — HYDRALAZINE HYDROCHLORIDE 50 MG: 50 TABLET ORAL at 14:45

## 2019-01-01 RX ADMIN — GLIPIZIDE 5 MG: 5 TABLET ORAL at 20:48

## 2019-01-01 RX ADMIN — INSULIN LISPRO 6 UNITS: 100 INJECTION, SOLUTION INTRAVENOUS; SUBCUTANEOUS at 13:41

## 2019-01-01 RX ADMIN — SODIUM BICARBONATE 1300 MG: 650 TABLET ORAL at 13:03

## 2019-01-01 RX ADMIN — PIPERACILLIN AND TAZOBACTAM 2.25 G: 2; .25 INJECTION, POWDER, LYOPHILIZED, FOR SOLUTION INTRAVENOUS at 20:11

## 2019-01-01 RX ADMIN — INSULIN LISPRO 3 UNITS: 100 INJECTION, SOLUTION INTRAVENOUS; SUBCUTANEOUS at 20:55

## 2019-01-01 RX ADMIN — HYDRALAZINE HYDROCHLORIDE 50 MG: 50 TABLET ORAL at 05:35

## 2019-01-01 RX ADMIN — DOCUSATE SODIUM 100 MG: 100 CAPSULE, LIQUID FILLED ORAL at 21:35

## 2019-01-01 RX ADMIN — OXYBUTYNIN CHLORIDE 5 MG: 5 TABLET ORAL at 08:47

## 2019-01-01 RX ADMIN — FUROSEMIDE 40 MG: 10 INJECTION, SOLUTION INTRAMUSCULAR; INTRAVENOUS at 12:57

## 2019-01-01 RX ADMIN — METOPROLOL SUCCINATE 50 MG: 50 TABLET, EXTENDED RELEASE ORAL at 09:39

## 2019-01-01 RX ADMIN — DOCUSATE SODIUM 100 MG: 100 CAPSULE, LIQUID FILLED ORAL at 21:21

## 2019-01-01 RX ADMIN — OXYBUTYNIN CHLORIDE 5 MG: 5 TABLET ORAL at 20:50

## 2019-01-01 RX ADMIN — GABAPENTIN 100 MG: 100 CAPSULE ORAL at 21:11

## 2019-01-01 RX ADMIN — DEXAMETHASONE SODIUM PHOSPHATE 2 MG: 4 INJECTION, SOLUTION INTRAMUSCULAR; INTRAVENOUS at 00:47

## 2019-01-01 RX ADMIN — INSULIN LISPRO 4 UNITS: 100 INJECTION, SOLUTION INTRAVENOUS; SUBCUTANEOUS at 18:05

## 2019-01-01 RX ADMIN — PIPERACILLIN AND TAZOBACTAM 2.25 G: 2; .25 INJECTION, POWDER, LYOPHILIZED, FOR SOLUTION INTRAVENOUS at 18:12

## 2019-01-01 RX ADMIN — ENALAPRILAT 0.62 MG: 2.5 INJECTION INTRAVENOUS at 05:35

## 2019-01-01 RX ADMIN — SENNOSIDES 8.6 MG: 8.6 TABLET, FILM COATED ORAL at 20:23

## 2019-01-01 RX ADMIN — INSULIN GLARGINE 15 UNITS: 100 INJECTION, SOLUTION SUBCUTANEOUS at 21:33

## 2019-01-01 RX ADMIN — INSULIN LISPRO 1 UNITS: 100 INJECTION, SOLUTION INTRAVENOUS; SUBCUTANEOUS at 21:55

## 2019-01-01 RX ADMIN — FUROSEMIDE 40 MG: 10 INJECTION, SOLUTION INTRAMUSCULAR; INTRAVENOUS at 17:13

## 2019-01-01 RX ADMIN — HEPARIN SODIUM 11 UNITS/KG/HR: 10000 INJECTION, SOLUTION INTRAVENOUS at 16:24

## 2019-01-01 RX ADMIN — Medication 10 ML: at 21:25

## 2019-01-01 RX ADMIN — GABAPENTIN 200 MG: 100 CAPSULE ORAL at 09:35

## 2019-01-01 RX ADMIN — DOCUSATE SODIUM 100 MG: 100 CAPSULE, LIQUID FILLED ORAL at 20:17

## 2019-01-01 RX ADMIN — OXYBUTYNIN CHLORIDE 5 MG: 5 TABLET ORAL at 20:06

## 2019-01-01 RX ADMIN — HEPARIN SODIUM 5000 UNITS: 5000 INJECTION INTRAVENOUS; SUBCUTANEOUS at 22:07

## 2019-01-01 RX ADMIN — DOCUSATE SODIUM 100 MG: 100 CAPSULE, LIQUID FILLED ORAL at 20:35

## 2019-01-01 RX ADMIN — DOXYCYCLINE 100 MG: 100 INJECTION, POWDER, LYOPHILIZED, FOR SOLUTION INTRAVENOUS at 18:09

## 2019-01-01 RX ADMIN — GABAPENTIN 100 MG: 100 CAPSULE ORAL at 21:31

## 2019-01-01 RX ADMIN — ACETAMINOPHEN 1000 MG: 500 TABLET ORAL at 18:00

## 2019-01-01 RX ADMIN — INSULIN GLARGINE 4 UNITS: 100 INJECTION, SOLUTION SUBCUTANEOUS at 20:44

## 2019-01-01 RX ADMIN — FENTANYL CITRATE 25 MCG: 50 INJECTION INTRAMUSCULAR; INTRAVENOUS at 05:26

## 2019-01-01 RX ADMIN — INSULIN LISPRO 3 UNITS: 100 INJECTION, SOLUTION INTRAVENOUS; SUBCUTANEOUS at 21:38

## 2019-01-01 RX ADMIN — ENALAPRILAT 0.62 MG: 2.5 INJECTION INTRAVENOUS at 12:00

## 2019-01-01 RX ADMIN — SODIUM BICARBONATE 1300 MG: 650 TABLET ORAL at 10:08

## 2019-01-01 RX ADMIN — HYDRALAZINE HYDROCHLORIDE 25 MG: 25 TABLET ORAL at 08:49

## 2019-01-01 RX ADMIN — SODIUM BICARBONATE 1300 MG: 650 TABLET ORAL at 10:21

## 2019-01-01 RX ADMIN — PIPERACILLIN AND TAZOBACTAM 2.25 G: 2; .25 INJECTION, POWDER, LYOPHILIZED, FOR SOLUTION INTRAVENOUS at 11:29

## 2019-01-01 RX ADMIN — OXYBUTYNIN CHLORIDE 5 MG: 5 TABLET ORAL at 15:54

## 2019-01-01 RX ADMIN — HYDRALAZINE HYDROCHLORIDE 25 MG: 25 TABLET ORAL at 13:02

## 2019-01-01 RX ADMIN — INSULIN LISPRO 3 UNITS: 100 INJECTION, SOLUTION INTRAVENOUS; SUBCUTANEOUS at 17:57

## 2019-01-01 RX ADMIN — INSULIN LISPRO 2 UNITS: 100 INJECTION, SOLUTION INTRAVENOUS; SUBCUTANEOUS at 17:12

## 2019-01-01 RX ADMIN — POLYETHYLENE GLYCOL (3350) 17 G: 17 POWDER, FOR SOLUTION ORAL at 08:12

## 2019-01-01 RX ADMIN — IPRATROPIUM BROMIDE AND ALBUTEROL SULFATE 1 AMPULE: .5; 3 SOLUTION RESPIRATORY (INHALATION) at 12:01

## 2019-01-01 RX ADMIN — SODIUM BICARBONATE 1300 MG: 650 TABLET ORAL at 20:15

## 2019-01-01 RX ADMIN — PIPERACILLIN AND TAZOBACTAM 2.25 G: 2; .25 INJECTION, POWDER, LYOPHILIZED, FOR SOLUTION INTRAVENOUS at 18:23

## 2019-01-01 RX ADMIN — METOPROLOL SUCCINATE 50 MG: 50 TABLET, EXTENDED RELEASE ORAL at 13:33

## 2019-01-01 RX ADMIN — Medication 50 MG: at 13:29

## 2019-01-01 RX ADMIN — INSULIN LISPRO 2 UNITS: 100 INJECTION, SOLUTION INTRAVENOUS; SUBCUTANEOUS at 13:09

## 2019-01-01 RX ADMIN — LOSARTAN POTASSIUM 100 MG: 100 TABLET, FILM COATED ORAL at 10:59

## 2019-01-01 RX ADMIN — METOPROLOL SUCCINATE 50 MG: 50 TABLET, EXTENDED RELEASE ORAL at 09:37

## 2019-01-01 RX ADMIN — OXYBUTYNIN CHLORIDE 5 MG: 5 TABLET ORAL at 21:14

## 2019-01-01 RX ADMIN — PIPERACILLIN AND TAZOBACTAM 2.25 G: 2; .25 INJECTION, POWDER, LYOPHILIZED, FOR SOLUTION INTRAVENOUS at 11:45

## 2019-01-01 RX ADMIN — ACETAMINOPHEN 1000 MG: 500 TABLET ORAL at 05:30

## 2019-01-01 RX ADMIN — GABAPENTIN 100 MG: 100 CAPSULE ORAL at 14:08

## 2019-01-01 RX ADMIN — DEXAMETHASONE SODIUM PHOSPHATE 2 MG: 4 INJECTION, SOLUTION INTRAMUSCULAR; INTRAVENOUS at 12:55

## 2019-01-01 RX ADMIN — PIPERACILLIN AND TAZOBACTAM 2.25 G: 2; .25 INJECTION, POWDER, LYOPHILIZED, FOR SOLUTION INTRAVENOUS at 21:54

## 2019-01-01 RX ADMIN — DOXYCYCLINE 100 MG: 100 INJECTION, POWDER, LYOPHILIZED, FOR SOLUTION INTRAVENOUS at 17:56

## 2019-01-01 RX ADMIN — ATORVASTATIN CALCIUM 20 MG: 20 TABLET, FILM COATED ORAL at 21:54

## 2019-01-01 RX ADMIN — POLYETHYLENE GLYCOL (3350) 17 G: 17 POWDER, FOR SOLUTION ORAL at 10:23

## 2019-01-01 RX ADMIN — HYDRALAZINE HYDROCHLORIDE 50 MG: 50 TABLET ORAL at 06:28

## 2019-01-01 RX ADMIN — FENTANYL CITRATE 25 MCG: 50 INJECTION, SOLUTION INTRAMUSCULAR; INTRAVENOUS at 10:53

## 2019-01-01 RX ADMIN — ENALAPRILAT 0.62 MG: 2.5 INJECTION INTRAVENOUS at 23:58

## 2019-01-01 RX ADMIN — DOXYCYCLINE 100 MG: 100 INJECTION, POWDER, LYOPHILIZED, FOR SOLUTION INTRAVENOUS at 17:35

## 2019-01-01 RX ADMIN — SENNOSIDES 8.6 MG: 8.6 TABLET, FILM COATED ORAL at 21:15

## 2019-01-01 RX ADMIN — OXYCODONE HYDROCHLORIDE 5 MG: 5 TABLET ORAL at 10:31

## 2019-01-01 RX ADMIN — INSULIN LISPRO 6 UNITS: 100 INJECTION, SOLUTION INTRAVENOUS; SUBCUTANEOUS at 18:12

## 2019-01-01 RX ADMIN — DOCUSATE SODIUM 100 MG: 100 CAPSULE, LIQUID FILLED ORAL at 13:26

## 2019-01-01 RX ADMIN — DEXAMETHASONE SODIUM PHOSPHATE 2 MG: 4 INJECTION, SOLUTION INTRAMUSCULAR; INTRAVENOUS at 05:36

## 2019-01-01 RX ADMIN — LEVOTHYROXINE SODIUM 112 MCG: 112 TABLET ORAL at 06:30

## 2019-01-01 RX ADMIN — CLOPIDOGREL BISULFATE 75 MG: 75 TABLET ORAL at 08:54

## 2019-01-01 RX ADMIN — ACETAMINOPHEN 1000 MG: 500 TABLET ORAL at 21:31

## 2019-01-01 RX ADMIN — SODIUM CHLORIDE SOLN NEBU 3% 4 ML: 3 NEBU SOLN at 10:38

## 2019-01-01 RX ADMIN — LEVOTHYROXINE SODIUM 112 MCG: 112 TABLET ORAL at 05:28

## 2019-01-01 RX ADMIN — GABAPENTIN 100 MG: 100 CAPSULE ORAL at 10:08

## 2019-01-01 RX ADMIN — LOSARTAN POTASSIUM 100 MG: 100 TABLET, FILM COATED ORAL at 20:50

## 2019-01-01 RX ADMIN — OXYBUTYNIN CHLORIDE 5 MG: 5 TABLET ORAL at 09:40

## 2019-01-01 RX ADMIN — ACETAMINOPHEN 1000 MG: 500 TABLET ORAL at 18:12

## 2019-01-01 RX ADMIN — INSULIN LISPRO 1 UNITS: 100 INJECTION, SOLUTION INTRAVENOUS; SUBCUTANEOUS at 21:18

## 2019-01-01 RX ADMIN — FENTANYL CITRATE 25 MCG: 50 INJECTION INTRAMUSCULAR; INTRAVENOUS at 23:18

## 2019-01-01 RX ADMIN — INSULIN LISPRO 2 UNITS: 100 INJECTION, SOLUTION INTRAVENOUS; SUBCUTANEOUS at 05:51

## 2019-01-01 RX ADMIN — METOPROLOL SUCCINATE 25 MG: 25 TABLET, FILM COATED, EXTENDED RELEASE ORAL at 20:21

## 2019-01-01 RX ADMIN — OXYBUTYNIN CHLORIDE 5 MG: 5 TABLET ORAL at 21:04

## 2019-01-01 RX ADMIN — FUROSEMIDE 40 MG: 40 TABLET ORAL at 11:47

## 2019-01-01 RX ADMIN — DOXYCYCLINE 100 MG: 100 INJECTION, POWDER, LYOPHILIZED, FOR SOLUTION INTRAVENOUS at 17:38

## 2019-01-01 RX ADMIN — HYDRALAZINE HYDROCHLORIDE 100 MG: 50 TABLET, FILM COATED ORAL at 10:15

## 2019-01-01 RX ADMIN — LORAZEPAM 1 MG: 2 INJECTION, SOLUTION INTRAMUSCULAR; INTRAVENOUS at 09:24

## 2019-01-01 RX ADMIN — DEXAMETHASONE SODIUM PHOSPHATE 2 MG: 4 INJECTION, SOLUTION INTRAMUSCULAR; INTRAVENOUS at 13:41

## 2019-01-01 RX ADMIN — CLOPIDOGREL BISULFATE 75 MG: 75 TABLET ORAL at 10:26

## 2019-01-01 RX ADMIN — INSULIN LISPRO 2 UNITS: 100 INJECTION, SOLUTION INTRAVENOUS; SUBCUTANEOUS at 06:21

## 2019-01-01 RX ADMIN — PANTOPRAZOLE SODIUM 40 MG: 40 TABLET, DELAYED RELEASE ORAL at 06:00

## 2019-01-01 RX ADMIN — MICONAZOLE NITRATE: 20 POWDER TOPICAL at 00:02

## 2019-01-01 RX ADMIN — IPRATROPIUM BROMIDE AND ALBUTEROL SULFATE 1 AMPULE: .5; 3 SOLUTION RESPIRATORY (INHALATION) at 19:53

## 2019-01-01 RX ADMIN — CLOPIDOGREL BISULFATE 75 MG: 75 TABLET ORAL at 15:50

## 2019-01-01 RX ADMIN — ALBUTEROL SULFATE 2.5 MG: 2.5 SOLUTION RESPIRATORY (INHALATION) at 09:42

## 2019-01-01 RX ADMIN — OXYBUTYNIN CHLORIDE 5 MG: 5 TABLET ORAL at 09:22

## 2019-01-01 RX ADMIN — MICONAZOLE NITRATE: 20 POWDER TOPICAL at 21:22

## 2019-01-01 RX ADMIN — DOCUSATE SODIUM 100 MG: 100 CAPSULE, LIQUID FILLED ORAL at 20:07

## 2019-01-01 RX ADMIN — SODIUM POLYSTYRENE SULFONATE 30 G: 15 SUSPENSION ORAL; RECTAL at 14:57

## 2019-01-01 RX ADMIN — CLOPIDOGREL BISULFATE 75 MG: 75 TABLET ORAL at 09:22

## 2019-01-01 RX ADMIN — PIPERACILLIN AND TAZOBACTAM 2.25 G: 2; .25 INJECTION, POWDER, LYOPHILIZED, FOR SOLUTION INTRAVENOUS at 12:40

## 2019-01-01 RX ADMIN — DIAZEPAM 5 MG: 5 TABLET ORAL at 21:31

## 2019-01-01 RX ADMIN — ALLOPURINOL 100 MG: 100 TABLET ORAL at 08:16

## 2019-01-01 RX ADMIN — ACETAMINOPHEN 1000 MG: 500 TABLET ORAL at 12:45

## 2019-01-01 RX ADMIN — ACETAMINOPHEN 1000 MG: 500 TABLET ORAL at 21:01

## 2019-01-01 RX ADMIN — PIPERACILLIN AND TAZOBACTAM 2.25 G: 2; .25 INJECTION, POWDER, LYOPHILIZED, FOR SOLUTION INTRAVENOUS at 11:49

## 2019-01-01 RX ADMIN — METOPROLOL SUCCINATE 50 MG: 50 TABLET, EXTENDED RELEASE ORAL at 10:41

## 2019-01-01 RX ADMIN — ACETAMINOPHEN 1000 MG: 500 TABLET ORAL at 04:51

## 2019-01-01 RX ADMIN — DIAZEPAM 5 MG: 5 TABLET ORAL at 21:11

## 2019-01-01 RX ADMIN — FUROSEMIDE 40 MG: 10 INJECTION, SOLUTION INTRAMUSCULAR; INTRAVENOUS at 07:20

## 2019-01-01 RX ADMIN — INSULIN LISPRO 2 UNITS: 100 INJECTION, SOLUTION INTRAVENOUS; SUBCUTANEOUS at 17:10

## 2019-01-01 RX ADMIN — GABAPENTIN 100 MG: 100 CAPSULE ORAL at 13:33

## 2019-01-01 RX ADMIN — ISOSORBIDE MONONITRATE 60 MG: 60 TABLET, EXTENDED RELEASE ORAL at 09:34

## 2019-01-01 RX ADMIN — PIPERACILLIN AND TAZOBACTAM 2.25 G: 2; .25 INJECTION, POWDER, LYOPHILIZED, FOR SOLUTION INTRAVENOUS at 03:19

## 2019-01-01 RX ADMIN — ACETAMINOPHEN 1000 MG: 500 TABLET ORAL at 10:55

## 2019-01-01 RX ADMIN — INSULIN LISPRO 3 UNITS: 100 INJECTION, SOLUTION INTRAVENOUS; SUBCUTANEOUS at 20:57

## 2019-01-01 RX ADMIN — LEVOTHYROXINE SODIUM 112 MCG: 112 TABLET ORAL at 05:44

## 2019-01-01 RX ADMIN — OXYCODONE HYDROCHLORIDE 10 MG: 5 TABLET ORAL at 23:37

## 2019-01-01 RX ADMIN — FENTANYL CITRATE 25 MCG: 50 INJECTION INTRAMUSCULAR; INTRAVENOUS at 00:15

## 2019-01-01 RX ADMIN — FENTANYL CITRATE 25 MCG: 50 INJECTION INTRAMUSCULAR; INTRAVENOUS at 09:28

## 2019-01-01 RX ADMIN — PIPERACILLIN AND TAZOBACTAM 2.25 G: 2; .25 INJECTION, POWDER, LYOPHILIZED, FOR SOLUTION INTRAVENOUS at 10:02

## 2019-01-01 RX ADMIN — METOPROLOL SUCCINATE 50 MG: 50 TABLET, EXTENDED RELEASE ORAL at 09:35

## 2019-01-01 RX ADMIN — Medication 50 MG: at 21:14

## 2019-01-01 RX ADMIN — INSULIN LISPRO 9 UNITS: 100 INJECTION, SOLUTION INTRAVENOUS; SUBCUTANEOUS at 13:14

## 2019-01-01 RX ADMIN — LEVOTHYROXINE SODIUM 112 MCG: 112 TABLET ORAL at 05:36

## 2019-01-01 RX ADMIN — HYDRALAZINE HYDROCHLORIDE 50 MG: 50 TABLET ORAL at 05:56

## 2019-01-01 RX ADMIN — ACETAMINOPHEN 1000 MG: 500 TABLET ORAL at 02:59

## 2019-01-01 RX ADMIN — MICONAZOLE NITRATE: 20 POWDER TOPICAL at 20:11

## 2019-01-01 RX ADMIN — FENTANYL CITRATE 25 MCG: 50 INJECTION INTRAMUSCULAR; INTRAVENOUS at 00:23

## 2019-01-01 RX ADMIN — MICONAZOLE NITRATE: 20 POWDER TOPICAL at 08:06

## 2019-01-01 RX ADMIN — ACETAMINOPHEN 1000 MG: 500 TABLET ORAL at 05:36

## 2019-01-01 RX ADMIN — OXYBUTYNIN CHLORIDE 5 MG: 5 TABLET ORAL at 20:22

## 2019-01-01 RX ADMIN — CLOPIDOGREL BISULFATE 75 MG: 75 TABLET ORAL at 09:52

## 2019-01-01 RX ADMIN — PIPERACILLIN AND TAZOBACTAM 2.25 G: 2; .25 INJECTION, POWDER, LYOPHILIZED, FOR SOLUTION INTRAVENOUS at 03:07

## 2019-01-01 RX ADMIN — LOSARTAN POTASSIUM 100 MG: 100 TABLET, FILM COATED ORAL at 11:47

## 2019-01-01 RX ADMIN — POLYETHYLENE GLYCOL (3350) 17 G: 17 POWDER, FOR SOLUTION ORAL at 10:15

## 2019-01-01 RX ADMIN — HEPARIN SODIUM 4000 UNITS: 1000 INJECTION INTRAVENOUS; SUBCUTANEOUS at 16:24

## 2019-01-01 RX ADMIN — DIAZEPAM 5 MG: 5 TABLET ORAL at 09:37

## 2019-01-01 RX ADMIN — INSULIN LISPRO 6 UNITS: 100 INJECTION, SOLUTION INTRAVENOUS; SUBCUTANEOUS at 14:15

## 2019-01-01 RX ADMIN — SENNOSIDES 8.6 MG: 8.6 TABLET, FILM COATED ORAL at 20:10

## 2019-01-01 RX ADMIN — Medication 50 MG: at 08:49

## 2019-01-01 RX ADMIN — IRON SUCROSE 300 MG: 20 INJECTION, SOLUTION INTRAVENOUS at 12:30

## 2019-01-01 RX ADMIN — DARBEPOETIN ALFA 40 MCG: 40 INJECTION, SOLUTION INTRAVENOUS; SUBCUTANEOUS at 11:17

## 2019-01-01 RX ADMIN — HEPARIN SODIUM 5000 UNITS: 5000 INJECTION INTRAVENOUS; SUBCUTANEOUS at 00:16

## 2019-01-01 RX ADMIN — LACTULOSE 20 G: 20 SOLUTION ORAL at 08:16

## 2019-01-01 RX ADMIN — INSULIN LISPRO 3 UNITS: 100 INJECTION, SOLUTION INTRAVENOUS; SUBCUTANEOUS at 20:28

## 2019-01-01 RX ADMIN — HEPARIN SODIUM 5000 UNITS: 5000 INJECTION INTRAVENOUS; SUBCUTANEOUS at 16:42

## 2019-01-01 RX ADMIN — LOSARTAN POTASSIUM 100 MG: 100 TABLET, FILM COATED ORAL at 09:37

## 2019-01-01 RX ADMIN — Medication 50 MG: at 08:24

## 2019-01-01 RX ADMIN — INSULIN GLARGINE 4 UNITS: 100 INJECTION, SOLUTION SUBCUTANEOUS at 21:13

## 2019-01-01 RX ADMIN — HYDRALAZINE HYDROCHLORIDE 50 MG: 50 TABLET ORAL at 21:15

## 2019-01-01 RX ADMIN — DEXAMETHASONE SODIUM PHOSPHATE 2 MG: 4 INJECTION, SOLUTION INTRAMUSCULAR; INTRAVENOUS at 20:58

## 2019-01-01 RX ADMIN — MICONAZOLE NITRATE: 20 POWDER TOPICAL at 10:44

## 2019-01-01 RX ADMIN — ALLOPURINOL 300 MG: 300 TABLET ORAL at 09:49

## 2019-01-01 RX ADMIN — DOCUSATE SODIUM 100 MG: 100 CAPSULE, LIQUID FILLED ORAL at 10:20

## 2019-01-01 RX ADMIN — Medication 10 ML: at 10:53

## 2019-01-01 RX ADMIN — CLOPIDOGREL BISULFATE 75 MG: 75 TABLET ORAL at 10:05

## 2019-01-01 RX ADMIN — INSULIN LISPRO 2 UNITS: 100 INJECTION, SOLUTION INTRAVENOUS; SUBCUTANEOUS at 18:13

## 2019-01-01 RX ADMIN — DOCUSATE SODIUM 100 MG: 100 CAPSULE, LIQUID FILLED ORAL at 08:17

## 2019-01-01 RX ADMIN — Medication 50 MG: at 20:11

## 2019-01-01 RX ADMIN — LEVOTHYROXINE SODIUM 112 MCG: 112 TABLET ORAL at 09:22

## 2019-01-01 RX ADMIN — CLOPIDOGREL BISULFATE 75 MG: 75 TABLET ORAL at 08:17

## 2019-01-01 RX ADMIN — HYDRALAZINE HYDROCHLORIDE 50 MG: 50 TABLET ORAL at 14:14

## 2019-01-01 RX ADMIN — HEPARIN SODIUM 5000 UNITS: 5000 INJECTION INTRAVENOUS; SUBCUTANEOUS at 05:30

## 2019-01-01 RX ADMIN — DEXAMETHASONE SODIUM PHOSPHATE 4 MG: 4 INJECTION, SOLUTION INTRAMUSCULAR; INTRAVENOUS at 05:26

## 2019-01-01 RX ADMIN — SODIUM CHLORIDE SOLN NEBU 3% 4 ML: 3 NEBU SOLN at 10:15

## 2019-01-01 RX ADMIN — HYDRALAZINE HYDROCHLORIDE 75 MG: 50 TABLET, FILM COATED ORAL at 14:03

## 2019-01-01 RX ADMIN — GABAPENTIN 100 MG: 100 CAPSULE ORAL at 10:15

## 2019-01-01 RX ADMIN — PIPERACILLIN AND TAZOBACTAM 2.25 G: 2; .25 INJECTION, POWDER, LYOPHILIZED, FOR SOLUTION INTRAVENOUS at 18:53

## 2019-01-01 RX ADMIN — INSULIN LISPRO 3 UNITS: 100 INJECTION, SOLUTION INTRAVENOUS; SUBCUTANEOUS at 18:12

## 2019-01-01 RX ADMIN — MICONAZOLE NITRATE: 20 POWDER TOPICAL at 09:19

## 2019-01-01 RX ADMIN — Medication 10 ML: at 20:44

## 2019-01-01 RX ADMIN — Medication 10 ML: at 18:23

## 2019-01-01 RX ADMIN — DEXAMETHASONE SODIUM PHOSPHATE 4 MG: 4 INJECTION, SOLUTION INTRAMUSCULAR; INTRAVENOUS at 08:10

## 2019-01-01 RX ADMIN — DEXAMETHASONE SODIUM PHOSPHATE 2 MG: 4 INJECTION, SOLUTION INTRAMUSCULAR; INTRAVENOUS at 21:32

## 2019-01-01 RX ADMIN — INSULIN LISPRO 6 UNITS: 100 INJECTION, SOLUTION INTRAVENOUS; SUBCUTANEOUS at 17:37

## 2019-01-01 RX ADMIN — OXYBUTYNIN CHLORIDE 5 MG: 5 TABLET ORAL at 21:21

## 2019-01-01 RX ADMIN — PROPOFOL 25 MCG/KG/MIN: 10 INJECTION, EMULSION INTRAVENOUS at 03:05

## 2019-01-01 RX ADMIN — Medication 50 MG: at 16:44

## 2019-01-01 RX ADMIN — MICONAZOLE NITRATE: 20 POWDER TOPICAL at 21:25

## 2019-01-01 RX ADMIN — DOXYCYCLINE 100 MG: 100 INJECTION, POWDER, LYOPHILIZED, FOR SOLUTION INTRAVENOUS at 05:35

## 2019-01-01 RX ADMIN — Medication 50 MG: at 08:51

## 2019-01-01 RX ADMIN — OXYBUTYNIN CHLORIDE 5 MG: 5 TABLET ORAL at 21:23

## 2019-01-01 RX ADMIN — DEXAMETHASONE SODIUM PHOSPHATE 4 MG: 4 INJECTION, SOLUTION INTRAMUSCULAR; INTRAVENOUS at 09:23

## 2019-01-01 RX ADMIN — CLOPIDOGREL BISULFATE 75 MG: 75 TABLET ORAL at 08:47

## 2019-01-01 RX ADMIN — DEXAMETHASONE SODIUM PHOSPHATE 2 MG: 4 INJECTION, SOLUTION INTRAMUSCULAR; INTRAVENOUS at 14:52

## 2019-01-01 RX ADMIN — SODIUM CHLORIDE: 9 INJECTION, SOLUTION INTRAVENOUS at 07:49

## 2019-01-01 RX ADMIN — DOCUSATE SODIUM 100 MG: 100 CAPSULE, LIQUID FILLED ORAL at 20:57

## 2019-01-01 RX ADMIN — HYDRALAZINE HYDROCHLORIDE 50 MG: 50 TABLET ORAL at 13:29

## 2019-01-01 RX ADMIN — DEXAMETHASONE SODIUM PHOSPHATE 2 MG: 4 INJECTION, SOLUTION INTRAMUSCULAR; INTRAVENOUS at 23:51

## 2019-01-01 RX ADMIN — OXYBUTYNIN CHLORIDE 5 MG: 5 TABLET ORAL at 10:40

## 2019-01-01 RX ADMIN — INSULIN LISPRO 6 UNITS: 100 INJECTION, SOLUTION INTRAVENOUS; SUBCUTANEOUS at 12:39

## 2019-01-01 RX ADMIN — Medication 50 MG: at 08:19

## 2019-01-01 RX ADMIN — ALBUTEROL SULFATE 2.5 MG: 2.5 SOLUTION RESPIRATORY (INHALATION) at 21:00

## 2019-01-01 RX ADMIN — HYDRALAZINE HYDROCHLORIDE 50 MG: 50 TABLET ORAL at 22:01

## 2019-01-01 RX ADMIN — OXYBUTYNIN CHLORIDE 5 MG: 5 TABLET ORAL at 10:08

## 2019-01-01 RX ADMIN — DEXTROSE AND SODIUM CHLORIDE: 5; 450 INJECTION, SOLUTION INTRAVENOUS at 07:27

## 2019-01-01 RX ADMIN — GLYCOPYRROLATE AND FORMOTEROL FUMARATE 2 PUFF: 9; 4.8 AEROSOL, METERED RESPIRATORY (INHALATION) at 20:17

## 2019-01-01 RX ADMIN — ISOSORBIDE MONONITRATE 60 MG: 60 TABLET, EXTENDED RELEASE ORAL at 13:33

## 2019-01-01 RX ADMIN — ALLOPURINOL 300 MG: 300 TABLET ORAL at 11:49

## 2019-01-01 RX ADMIN — OXYCODONE HYDROCHLORIDE 5 MG: 5 TABLET ORAL at 23:08

## 2019-01-01 RX ADMIN — PIPERACILLIN AND TAZOBACTAM 2.25 G: 2; .25 INJECTION, POWDER, LYOPHILIZED, FOR SOLUTION INTRAVENOUS at 19:40

## 2019-01-01 RX ADMIN — LEVOTHYROXINE SODIUM 112 MCG: 112 TABLET ORAL at 05:56

## 2019-01-01 RX ADMIN — GLYCOPYRROLATE AND FORMOTEROL FUMARATE 2 PUFF: 9; 4.8 AEROSOL, METERED RESPIRATORY (INHALATION) at 21:46

## 2019-01-01 RX ADMIN — OXYBUTYNIN CHLORIDE 5 MG: 5 TABLET ORAL at 10:15

## 2019-01-01 RX ADMIN — ALBUTEROL SULFATE 2.5 MG: 2.5 SOLUTION RESPIRATORY (INHALATION) at 10:38

## 2019-01-01 RX ADMIN — METOPROLOL SUCCINATE 25 MG: 25 TABLET, FILM COATED, EXTENDED RELEASE ORAL at 08:46

## 2019-01-01 RX ADMIN — INSULIN LISPRO 2 UNITS: 100 INJECTION, SOLUTION INTRAVENOUS; SUBCUTANEOUS at 11:57

## 2019-01-01 RX ADMIN — PIPERACILLIN AND TAZOBACTAM 2.25 G: 2; .25 INJECTION, POWDER, LYOPHILIZED, FOR SOLUTION INTRAVENOUS at 20:48

## 2019-01-01 RX ADMIN — LEVOTHYROXINE SODIUM 112 MCG: 112 TABLET ORAL at 05:11

## 2019-01-01 RX ADMIN — ACETAMINOPHEN 1000 MG: 500 TABLET ORAL at 16:34

## 2019-01-01 RX ADMIN — FAMOTIDINE 20 MG: 10 INJECTION, SOLUTION INTRAVENOUS at 09:15

## 2019-01-01 RX ADMIN — DEXAMETHASONE SODIUM PHOSPHATE 4 MG: 4 INJECTION, SOLUTION INTRAMUSCULAR; INTRAVENOUS at 00:00

## 2019-01-01 RX ADMIN — Medication 50 MG: at 20:08

## 2019-01-01 RX ADMIN — SODIUM CHLORIDE SOLN NEBU 3% 4 ML: 3 NEBU SOLN at 09:48

## 2019-01-01 RX ADMIN — PIPERACILLIN AND TAZOBACTAM 2.25 G: 2; .25 INJECTION, POWDER, LYOPHILIZED, FOR SOLUTION INTRAVENOUS at 10:10

## 2019-01-01 RX ADMIN — HYDRALAZINE HYDROCHLORIDE 100 MG: 50 TABLET, FILM COATED ORAL at 16:35

## 2019-01-01 RX ADMIN — LEVOTHYROXINE SODIUM 112 MCG: 112 TABLET ORAL at 08:36

## 2019-01-01 RX ADMIN — SODIUM BICARBONATE: 84 INJECTION, SOLUTION INTRAVENOUS at 14:40

## 2019-01-01 RX ADMIN — OXYBUTYNIN CHLORIDE 5 MG: 5 TABLET ORAL at 10:27

## 2019-01-01 RX ADMIN — IPRATROPIUM BROMIDE AND ALBUTEROL SULFATE 1 AMPULE: .5; 3 SOLUTION RESPIRATORY (INHALATION) at 20:06

## 2019-01-01 RX ADMIN — HYDRALAZINE HYDROCHLORIDE 50 MG: 50 TABLET ORAL at 12:57

## 2019-01-01 RX ADMIN — PIPERACILLIN AND TAZOBACTAM 2.25 G: 2; .25 INJECTION, POWDER, LYOPHILIZED, FOR SOLUTION INTRAVENOUS at 02:06

## 2019-01-01 RX ADMIN — DIAZEPAM 5 MG: 5 TABLET ORAL at 21:35

## 2019-01-01 RX ADMIN — Medication 10 ML: at 21:39

## 2019-01-01 RX ADMIN — HYDRALAZINE HYDROCHLORIDE 50 MG: 50 TABLET ORAL at 19:20

## 2019-01-01 RX ADMIN — ALLOPURINOL 100 MG: 100 TABLET ORAL at 10:15

## 2019-01-01 RX ADMIN — INSULIN LISPRO 6 UNITS: 100 INJECTION, SOLUTION INTRAVENOUS; SUBCUTANEOUS at 09:12

## 2019-01-01 RX ADMIN — ATORVASTATIN CALCIUM 20 MG: 20 TABLET, FILM COATED ORAL at 21:23

## 2019-01-01 RX ADMIN — ATORVASTATIN CALCIUM 20 MG: 20 TABLET, FILM COATED ORAL at 20:15

## 2019-01-01 RX ADMIN — INSULIN LISPRO 1 UNITS: 100 INJECTION, SOLUTION INTRAVENOUS; SUBCUTANEOUS at 21:42

## 2019-01-01 RX ADMIN — ALBUTEROL SULFATE 2.5 MG: 2.5 SOLUTION RESPIRATORY (INHALATION) at 08:14

## 2019-01-01 RX ADMIN — DEXAMETHASONE SODIUM PHOSPHATE 2 MG: 4 INJECTION, SOLUTION INTRAMUSCULAR; INTRAVENOUS at 13:08

## 2019-01-01 RX ADMIN — DIAZEPAM 5 MG: 5 TABLET ORAL at 21:01

## 2019-01-01 RX ADMIN — INSULIN GLARGINE 7 UNITS: 100 INJECTION, SOLUTION SUBCUTANEOUS at 20:28

## 2019-01-01 RX ADMIN — FENTANYL CITRATE 25 MCG: 50 INJECTION INTRAMUSCULAR; INTRAVENOUS at 16:06

## 2019-01-01 RX ADMIN — METHYLPREDNISOLONE ACETATE 80 MG: 80 INJECTION, SUSPENSION INTRA-ARTICULAR; INTRALESIONAL; INTRAMUSCULAR; SOFT TISSUE at 10:05

## 2019-01-01 RX ADMIN — SODIUM BICARBONATE 1300 MG: 650 TABLET ORAL at 21:15

## 2019-01-01 RX ADMIN — ENALAPRILAT 0.62 MG: 2.5 INJECTION INTRAVENOUS at 17:13

## 2019-01-01 RX ADMIN — INSULIN LISPRO 2 UNITS: 100 INJECTION, SOLUTION INTRAVENOUS; SUBCUTANEOUS at 17:31

## 2019-01-01 RX ADMIN — SENNOSIDES 8.6 MG: 8.6 TABLET, FILM COATED ORAL at 21:20

## 2019-01-01 RX ADMIN — ACETAMINOPHEN 1000 MG: 500 TABLET ORAL at 01:22

## 2019-01-01 RX ADMIN — INSULIN LISPRO 2 UNITS: 100 INJECTION, SOLUTION INTRAVENOUS; SUBCUTANEOUS at 21:14

## 2019-01-01 RX ADMIN — Medication 50 MG: at 20:21

## 2019-01-01 RX ADMIN — ALLOPURINOL 100 MG: 100 TABLET ORAL at 10:08

## 2019-01-01 RX ADMIN — HEPARIN SODIUM 5000 UNITS: 5000 INJECTION INTRAVENOUS; SUBCUTANEOUS at 16:45

## 2019-01-01 RX ADMIN — INSULIN LISPRO 2 UNITS: 100 INJECTION, SOLUTION INTRAVENOUS; SUBCUTANEOUS at 09:08

## 2019-01-01 RX ADMIN — LACTULOSE 20 G: 20 SOLUTION ORAL at 21:11

## 2019-01-01 RX ADMIN — OXYBUTYNIN CHLORIDE 5 MG: 5 TABLET ORAL at 09:52

## 2019-01-01 RX ADMIN — METOPROLOL SUCCINATE 25 MG: 25 TABLET, FILM COATED, EXTENDED RELEASE ORAL at 08:17

## 2019-01-01 RX ADMIN — DOXYCYCLINE 100 MG: 100 INJECTION, POWDER, LYOPHILIZED, FOR SOLUTION INTRAVENOUS at 19:43

## 2019-01-01 RX ADMIN — ALBUTEROL SULFATE 2.5 MG: 2.5 SOLUTION RESPIRATORY (INHALATION) at 21:46

## 2019-01-01 RX ADMIN — ALBUTEROL SULFATE 2.5 MG: 2.5 SOLUTION RESPIRATORY (INHALATION) at 09:41

## 2019-01-01 RX ADMIN — HEPARIN SODIUM 5000 UNITS: 5000 INJECTION INTRAVENOUS; SUBCUTANEOUS at 20:51

## 2019-01-01 RX ADMIN — ENALAPRILAT 0.62 MG: 2.5 INJECTION INTRAVENOUS at 12:43

## 2019-01-01 RX ADMIN — ATORVASTATIN CALCIUM 20 MG: 20 TABLET, FILM COATED ORAL at 21:31

## 2019-01-01 RX ADMIN — GABAPENTIN 200 MG: 100 CAPSULE ORAL at 21:22

## 2019-01-01 RX ADMIN — DEXAMETHASONE SODIUM PHOSPHATE 4 MG: 4 INJECTION, SOLUTION INTRAMUSCULAR; INTRAVENOUS at 05:39

## 2019-01-01 RX ADMIN — INSULIN LISPRO 1 UNITS: 100 INJECTION, SOLUTION INTRAVENOUS; SUBCUTANEOUS at 22:06

## 2019-01-01 RX ADMIN — Medication 50 MG: at 20:42

## 2019-01-01 RX ADMIN — MICONAZOLE NITRATE: 20 POWDER TOPICAL at 20:07

## 2019-01-01 RX ADMIN — GLYCOPYRROLATE AND FORMOTEROL FUMARATE 2 PUFF: 9; 4.8 AEROSOL, METERED RESPIRATORY (INHALATION) at 08:33

## 2019-01-01 RX ADMIN — Medication 10 ML: at 08:54

## 2019-01-01 RX ADMIN — Medication 10 ML: at 20:11

## 2019-01-01 RX ADMIN — FUROSEMIDE 40 MG: 10 INJECTION, SOLUTION INTRAMUSCULAR; INTRAVENOUS at 04:51

## 2019-01-01 RX ADMIN — OXYBUTYNIN CHLORIDE 5 MG: 5 TABLET ORAL at 08:17

## 2019-01-01 RX ADMIN — ENALAPRILAT 0.62 MG: 2.5 INJECTION INTRAVENOUS at 07:01

## 2019-01-01 RX ADMIN — PIPERACILLIN AND TAZOBACTAM 2.25 G: 2; .25 INJECTION, POWDER, LYOPHILIZED, FOR SOLUTION INTRAVENOUS at 19:09

## 2019-01-01 RX ADMIN — INSULIN GLARGINE 15 UNITS: 100 INJECTION, SOLUTION SUBCUTANEOUS at 21:40

## 2019-01-01 RX ADMIN — ALLOPURINOL 100 MG: 100 TABLET ORAL at 08:49

## 2019-01-01 RX ADMIN — ALBUTEROL SULFATE 2.5 MG: 2.5 SOLUTION RESPIRATORY (INHALATION) at 22:14

## 2019-01-01 RX ADMIN — ACETAMINOPHEN 1000 MG: 500 TABLET ORAL at 01:11

## 2019-01-01 RX ADMIN — OMEPRAZOLE 40 MG: 40 CAPSULE, DELAYED RELEASE ORAL at 05:26

## 2019-01-01 RX ADMIN — INSULIN LISPRO 2 UNITS: 100 INJECTION, SOLUTION INTRAVENOUS; SUBCUTANEOUS at 12:13

## 2019-01-01 RX ADMIN — INSULIN GLARGINE 10 UNITS: 100 INJECTION, SOLUTION SUBCUTANEOUS at 21:18

## 2019-01-01 RX ADMIN — ALLOPURINOL 100 MG: 100 TABLET ORAL at 10:21

## 2019-01-01 RX ADMIN — HYDRALAZINE HYDROCHLORIDE 75 MG: 50 TABLET, FILM COATED ORAL at 21:55

## 2019-01-01 RX ADMIN — ONDANSETRON 4 MG: 2 INJECTION INTRAMUSCULAR; INTRAVENOUS at 10:53

## 2019-01-01 RX ADMIN — Medication 50 MG: at 21:15

## 2019-01-01 RX ADMIN — OXYCODONE HYDROCHLORIDE 10 MG: 5 TABLET ORAL at 05:50

## 2019-01-01 RX ADMIN — INSULIN GLARGINE 4 UNITS: 100 INJECTION, SOLUTION SUBCUTANEOUS at 21:59

## 2019-01-01 RX ADMIN — CLOPIDOGREL BISULFATE 75 MG: 75 TABLET ORAL at 09:34

## 2019-01-01 RX ADMIN — ISOSORBIDE MONONITRATE 60 MG: 60 TABLET, EXTENDED RELEASE ORAL at 09:37

## 2019-01-01 RX ADMIN — DOXYCYCLINE 100 MG: 100 INJECTION, POWDER, LYOPHILIZED, FOR SOLUTION INTRAVENOUS at 19:20

## 2019-01-01 RX ADMIN — ACETAMINOPHEN 1000 MG: 500 TABLET ORAL at 10:22

## 2019-01-01 RX ADMIN — SENNOSIDES 8.6 MG: 8.6 TABLET, FILM COATED ORAL at 20:43

## 2019-01-01 RX ADMIN — Medication 50 MG: at 14:08

## 2019-01-01 RX ADMIN — FUROSEMIDE 40 MG: 40 TABLET ORAL at 11:02

## 2019-01-01 RX ADMIN — SENNOSIDES 8.6 MG: 8.6 TABLET, FILM COATED ORAL at 20:15

## 2019-01-01 RX ADMIN — OXYBUTYNIN CHLORIDE 5 MG: 5 TABLET ORAL at 08:54

## 2019-01-01 RX ADMIN — DOCUSATE SODIUM 100 MG: 100 CAPSULE, LIQUID FILLED ORAL at 09:26

## 2019-01-01 RX ADMIN — FENTANYL CITRATE 25 MCG: 50 INJECTION INTRAMUSCULAR; INTRAVENOUS at 19:14

## 2019-01-01 RX ADMIN — ACETAMINOPHEN 1000 MG: 500 TABLET ORAL at 18:19

## 2019-01-01 RX ADMIN — DOXYCYCLINE 100 MG: 100 INJECTION, POWDER, LYOPHILIZED, FOR SOLUTION INTRAVENOUS at 06:09

## 2019-01-01 RX ADMIN — INSULIN LISPRO 2 UNITS: 100 INJECTION, SOLUTION INTRAVENOUS; SUBCUTANEOUS at 05:56

## 2019-01-01 RX ADMIN — OXYBUTYNIN CHLORIDE 5 MG: 5 TABLET ORAL at 09:37

## 2019-01-01 RX ADMIN — FAMOTIDINE 20 MG: 10 INJECTION, SOLUTION INTRAVENOUS at 08:24

## 2019-01-01 RX ADMIN — HYDRALAZINE HYDROCHLORIDE 75 MG: 50 TABLET, FILM COATED ORAL at 13:08

## 2019-01-01 RX ADMIN — Medication 10 ML: at 09:15

## 2019-01-01 RX ADMIN — PROPOFOL 40 MCG/KG/MIN: 10 INJECTION, EMULSION INTRAVENOUS at 11:33

## 2019-01-01 RX ADMIN — SODIUM CHLORIDE 250 ML: 9 INJECTION, SOLUTION INTRAVENOUS at 07:22

## 2019-01-01 RX ADMIN — DEXAMETHASONE SODIUM PHOSPHATE 2 MG: 4 INJECTION, SOLUTION INTRAMUSCULAR; INTRAVENOUS at 05:50

## 2019-01-01 RX ADMIN — ACETAMINOPHEN 1000 MG: 500 TABLET ORAL at 01:41

## 2019-01-01 RX ADMIN — ISOSORBIDE MONONITRATE 60 MG: 60 TABLET, EXTENDED RELEASE ORAL at 11:50

## 2019-01-01 RX ADMIN — INSULIN LISPRO 2 UNITS: 100 INJECTION, SOLUTION INTRAVENOUS; SUBCUTANEOUS at 17:59

## 2019-01-01 RX ADMIN — INSULIN LISPRO 2 UNITS: 100 INJECTION, SOLUTION INTRAVENOUS; SUBCUTANEOUS at 08:14

## 2019-01-01 RX ADMIN — GABAPENTIN 100 MG: 100 CAPSULE ORAL at 13:02

## 2019-01-01 RX ADMIN — DEXAMETHASONE SODIUM PHOSPHATE 4 MG: 4 INJECTION, SOLUTION INTRAMUSCULAR; INTRAVENOUS at 16:25

## 2019-01-01 RX ADMIN — ALLOPURINOL 100 MG: 100 TABLET ORAL at 08:10

## 2019-01-01 RX ADMIN — MICONAZOLE NITRATE: 20 POWDER TOPICAL at 08:54

## 2019-01-01 RX ADMIN — INSULIN LISPRO 2 UNITS: 100 INJECTION, SOLUTION INTRAVENOUS; SUBCUTANEOUS at 17:00

## 2019-01-01 RX ADMIN — LACTULOSE 20 G: 20 SOLUTION ORAL at 20:11

## 2019-01-01 RX ADMIN — SODIUM CHLORIDE SOLN NEBU 3% 4 ML: 3 NEBU SOLN at 08:29

## 2019-01-01 RX ADMIN — ALLOPURINOL 300 MG: 300 TABLET ORAL at 11:00

## 2019-01-01 RX ADMIN — LOSARTAN POTASSIUM 100 MG: 100 TABLET, FILM COATED ORAL at 10:40

## 2019-01-01 RX ADMIN — LEVOTHYROXINE SODIUM 112 MCG: 112 TABLET ORAL at 05:37

## 2019-01-01 RX ADMIN — Medication 50 MG: at 14:45

## 2019-01-01 RX ADMIN — ACETAMINOPHEN 1000 MG: 500 TABLET ORAL at 16:44

## 2019-01-01 RX ADMIN — HYDRALAZINE HYDROCHLORIDE 25 MG: 25 TABLET ORAL at 21:11

## 2019-01-01 RX ADMIN — INSULIN LISPRO 4 UNITS: 100 INJECTION, SOLUTION INTRAVENOUS; SUBCUTANEOUS at 18:11

## 2019-01-01 RX ADMIN — ATORVASTATIN CALCIUM 20 MG: 20 TABLET, FILM COATED ORAL at 20:10

## 2019-01-01 RX ADMIN — ATORVASTATIN CALCIUM 20 MG: 20 TABLET, FILM COATED ORAL at 21:11

## 2019-01-01 RX ADMIN — GABAPENTIN 100 MG: 100 CAPSULE ORAL at 21:35

## 2019-01-01 RX ADMIN — INSULIN LISPRO 2 UNITS: 100 INJECTION, SOLUTION INTRAVENOUS; SUBCUTANEOUS at 11:55

## 2019-01-01 RX ADMIN — GABAPENTIN 100 MG: 100 CAPSULE ORAL at 13:41

## 2019-01-01 RX ADMIN — ISOSORBIDE MONONITRATE 60 MG: 60 TABLET, EXTENDED RELEASE ORAL at 10:50

## 2019-01-01 RX ADMIN — DIAZEPAM 5 MG: 5 TABLET ORAL at 20:57

## 2019-01-01 RX ADMIN — GABAPENTIN 100 MG: 100 CAPSULE ORAL at 20:57

## 2019-01-01 RX ADMIN — GLYCOPYRROLATE AND FORMOTEROL FUMARATE 2 PUFF: 9; 4.8 AEROSOL, METERED RESPIRATORY (INHALATION) at 21:03

## 2019-01-01 RX ADMIN — SENNOSIDES 8.6 MG: 8.6 TABLET, FILM COATED ORAL at 20:11

## 2019-01-01 RX ADMIN — ATORVASTATIN CALCIUM 20 MG: 20 TABLET, FILM COATED ORAL at 20:43

## 2019-01-01 RX ADMIN — METOPROLOL SUCCINATE 50 MG: 50 TABLET, EXTENDED RELEASE ORAL at 20:50

## 2019-01-01 RX ADMIN — GLYCOPYRROLATE AND FORMOTEROL FUMARATE 2 PUFF: 9; 4.8 AEROSOL, METERED RESPIRATORY (INHALATION) at 05:53

## 2019-01-01 RX ADMIN — OXYBUTYNIN CHLORIDE 5 MG: 5 TABLET ORAL at 09:39

## 2019-01-01 RX ADMIN — ACETAMINOPHEN 1000 MG: 500 TABLET ORAL at 03:09

## 2019-01-01 RX ADMIN — MAGNESIUM SULFATE IN WATER 2 G: 40 INJECTION, SOLUTION INTRAVENOUS at 09:41

## 2019-01-01 RX ADMIN — ACETAMINOPHEN 1000 MG: 500 TABLET ORAL at 20:11

## 2019-01-01 RX ADMIN — HYDRALAZINE HYDROCHLORIDE 100 MG: 50 TABLET, FILM COATED ORAL at 20:48

## 2019-01-01 RX ADMIN — OMEPRAZOLE 40 MG: 40 CAPSULE, DELAYED RELEASE ORAL at 05:28

## 2019-01-01 RX ADMIN — LEVOTHYROXINE SODIUM 112 MCG: 112 TABLET ORAL at 20:49

## 2019-01-01 RX ADMIN — ALBUTEROL SULFATE 2.5 MG: 2.5 SOLUTION RESPIRATORY (INHALATION) at 22:08

## 2019-01-01 RX ADMIN — PIPERACILLIN AND TAZOBACTAM 2.25 G: 2; .25 INJECTION, POWDER, LYOPHILIZED, FOR SOLUTION INTRAVENOUS at 11:00

## 2019-01-01 RX ADMIN — HYDRALAZINE HYDROCHLORIDE 10 MG: 20 INJECTION, SOLUTION INTRAMUSCULAR; INTRAVENOUS at 04:42

## 2019-01-01 RX ADMIN — INSULIN GLARGINE 10 UNITS: 100 INJECTION, SOLUTION SUBCUTANEOUS at 20:56

## 2019-01-01 RX ADMIN — HYDRALAZINE HYDROCHLORIDE 50 MG: 50 TABLET ORAL at 06:00

## 2019-01-01 RX ADMIN — ISOSORBIDE MONONITRATE 60 MG: 60 TABLET, EXTENDED RELEASE ORAL at 11:21

## 2019-01-01 RX ADMIN — OMEPRAZOLE 40 MG: 40 CAPSULE, DELAYED RELEASE ORAL at 05:51

## 2019-01-01 RX ADMIN — OXYBUTYNIN CHLORIDE 5 MG: 5 TABLET ORAL at 23:51

## 2019-01-01 RX ADMIN — MICONAZOLE NITRATE: 20 POWDER TOPICAL at 21:38

## 2019-01-01 RX ADMIN — INSULIN LISPRO 2 UNITS: 100 INJECTION, SOLUTION INTRAVENOUS; SUBCUTANEOUS at 12:45

## 2019-01-01 RX ADMIN — ACETAMINOPHEN 1000 MG: 500 TABLET ORAL at 12:00

## 2019-01-01 RX ADMIN — LACTULOSE 20 G: 20 SOLUTION ORAL at 11:19

## 2019-01-01 RX ADMIN — OXYBUTYNIN CHLORIDE 5 MG: 5 TABLET ORAL at 20:38

## 2019-01-01 RX ADMIN — HYDRALAZINE HYDROCHLORIDE 100 MG: 50 TABLET, FILM COATED ORAL at 13:33

## 2019-01-01 RX ADMIN — HEPARIN SODIUM 5000 UNITS: 5000 INJECTION INTRAVENOUS; SUBCUTANEOUS at 05:11

## 2019-01-01 RX ADMIN — ACETAMINOPHEN 1000 MG: 500 TABLET ORAL at 12:05

## 2019-01-01 RX ADMIN — MORPHINE SULFATE 2 MG: 2 INJECTION, SOLUTION INTRAMUSCULAR; INTRAVENOUS at 13:27

## 2019-01-01 RX ADMIN — FAMOTIDINE 20 MG: 10 INJECTION, SOLUTION INTRAVENOUS at 08:16

## 2019-01-01 RX ADMIN — ATORVASTATIN CALCIUM 20 MG: 20 TABLET, FILM COATED ORAL at 21:35

## 2019-01-01 RX ADMIN — HYDRALAZINE HYDROCHLORIDE 100 MG: 50 TABLET, FILM COATED ORAL at 09:37

## 2019-01-01 RX ADMIN — OXYBUTYNIN CHLORIDE 5 MG: 5 TABLET ORAL at 08:12

## 2019-01-01 RX ADMIN — HYDRALAZINE HYDROCHLORIDE 10 MG: 20 INJECTION, SOLUTION INTRAMUSCULAR; INTRAVENOUS at 00:11

## 2019-01-01 RX ADMIN — LEVOTHYROXINE SODIUM 112 MCG: 112 TABLET ORAL at 06:56

## 2019-01-01 RX ADMIN — OXYBUTYNIN CHLORIDE 5 MG: 5 TABLET ORAL at 20:12

## 2019-01-01 RX ADMIN — ALLOPURINOL 100 MG: 100 TABLET ORAL at 08:05

## 2019-01-01 RX ADMIN — ACETAMINOPHEN 1000 MG: 500 TABLET ORAL at 02:44

## 2019-01-01 RX ADMIN — FAMOTIDINE 20 MG: 10 INJECTION, SOLUTION INTRAVENOUS at 08:15

## 2019-01-01 RX ADMIN — GABAPENTIN 100 MG: 100 CAPSULE ORAL at 20:36

## 2019-01-01 RX ADMIN — INSULIN LISPRO 6 UNITS: 100 INJECTION, SOLUTION INTRAVENOUS; SUBCUTANEOUS at 13:10

## 2019-01-01 RX ADMIN — DEXAMETHASONE SODIUM PHOSPHATE 4 MG: 4 INJECTION, SOLUTION INTRAMUSCULAR; INTRAVENOUS at 02:03

## 2019-01-01 RX ADMIN — INSULIN LISPRO 2 UNITS: 100 INJECTION, SOLUTION INTRAVENOUS; SUBCUTANEOUS at 21:27

## 2019-01-01 RX ADMIN — MICONAZOLE NITRATE: 20 POWDER TOPICAL at 20:46

## 2019-01-01 RX ADMIN — DOCUSATE SODIUM 100 MG: 100 CAPSULE, LIQUID FILLED ORAL at 08:49

## 2019-01-01 RX ADMIN — HEPARIN SODIUM 5000 UNITS: 5000 INJECTION INTRAVENOUS; SUBCUTANEOUS at 21:18

## 2019-01-01 RX ADMIN — ACETAMINOPHEN 1000 MG: 500 TABLET ORAL at 11:47

## 2019-01-01 RX ADMIN — HEPARIN SODIUM 5000 UNITS: 5000 INJECTION INTRAVENOUS; SUBCUTANEOUS at 09:26

## 2019-01-01 RX ADMIN — FENTANYL CITRATE 25 MCG: 50 INJECTION, SOLUTION INTRAMUSCULAR; INTRAVENOUS at 12:28

## 2019-01-01 RX ADMIN — ALLOPURINOL 300 MG: 300 TABLET ORAL at 11:02

## 2019-01-01 RX ADMIN — ATORVASTATIN CALCIUM 20 MG: 20 TABLET, FILM COATED ORAL at 21:16

## 2019-01-01 RX ADMIN — LEVOTHYROXINE SODIUM 112 MCG: 112 TABLET ORAL at 05:35

## 2019-01-01 RX ADMIN — INSULIN GLARGINE 4 UNITS: 100 INJECTION, SOLUTION SUBCUTANEOUS at 20:33

## 2019-01-01 RX ADMIN — PROPOFOL 20 MCG/KG/MIN: 10 INJECTION, EMULSION INTRAVENOUS at 10:56

## 2019-01-01 RX ADMIN — DOXYCYCLINE 100 MG: 100 INJECTION, POWDER, LYOPHILIZED, FOR SOLUTION INTRAVENOUS at 05:32

## 2019-01-01 RX ADMIN — MICONAZOLE NITRATE: 20 POWDER TOPICAL at 08:17

## 2019-01-01 RX ADMIN — PIPERACILLIN AND TAZOBACTAM 2.25 G: 2; .25 INJECTION, POWDER, LYOPHILIZED, FOR SOLUTION INTRAVENOUS at 18:33

## 2019-01-01 RX ADMIN — GLYCOPYRROLATE AND FORMOTEROL FUMARATE 2 PUFF: 9; 4.8 AEROSOL, METERED RESPIRATORY (INHALATION) at 05:23

## 2019-01-01 RX ADMIN — DOCUSATE SODIUM 100 MG: 100 CAPSULE, LIQUID FILLED ORAL at 09:10

## 2019-01-01 RX ADMIN — METOPROLOL SUCCINATE 50 MG: 50 TABLET, EXTENDED RELEASE ORAL at 11:50

## 2019-01-01 RX ADMIN — GABAPENTIN 200 MG: 100 CAPSULE ORAL at 20:05

## 2019-01-01 RX ADMIN — GLYCOPYRROLATE AND FORMOTEROL FUMARATE 2 PUFF: 9; 4.8 AEROSOL, METERED RESPIRATORY (INHALATION) at 22:24

## 2019-01-01 RX ADMIN — FAMOTIDINE 20 MG: 10 INJECTION, SOLUTION INTRAVENOUS at 20:12

## 2019-01-01 RX ADMIN — ALLOPURINOL 300 MG: 300 TABLET ORAL at 13:33

## 2019-01-01 RX ADMIN — GABAPENTIN 200 MG: 100 CAPSULE ORAL at 09:50

## 2019-01-01 RX ADMIN — OXYBUTYNIN CHLORIDE 5 MG: 5 TABLET ORAL at 20:46

## 2019-01-01 RX ADMIN — ACETAMINOPHEN 1000 MG: 500 TABLET ORAL at 21:37

## 2019-01-01 RX ADMIN — INSULIN LISPRO 6 UNITS: 100 INJECTION, SOLUTION INTRAVENOUS; SUBCUTANEOUS at 18:00

## 2019-01-01 RX ADMIN — LACTULOSE 20 G: 20 SOLUTION ORAL at 20:35

## 2019-01-01 RX ADMIN — GABAPENTIN 100 MG: 100 CAPSULE ORAL at 13:11

## 2019-01-01 RX ADMIN — ATORVASTATIN CALCIUM 20 MG: 20 TABLET, FILM COATED ORAL at 20:57

## 2019-01-01 RX ADMIN — PIPERACILLIN AND TAZOBACTAM 2.25 G: 2; .25 INJECTION, POWDER, LYOPHILIZED, FOR SOLUTION INTRAVENOUS at 03:24

## 2019-01-01 RX ADMIN — Medication 10 ML: at 20:15

## 2019-01-01 RX ADMIN — PIPERACILLIN AND TAZOBACTAM 2.25 G: 2; .25 INJECTION, POWDER, LYOPHILIZED, FOR SOLUTION INTRAVENOUS at 18:17

## 2019-01-01 RX ADMIN — PROPOFOL 20 MCG/KG/MIN: 10 INJECTION, EMULSION INTRAVENOUS at 03:09

## 2019-01-01 RX ADMIN — METOPROLOL SUCCINATE 25 MG: 25 TABLET, FILM COATED, EXTENDED RELEASE ORAL at 10:21

## 2019-01-01 RX ADMIN — ENALAPRILAT 0.62 MG: 2.5 INJECTION INTRAVENOUS at 00:34

## 2019-01-01 RX ADMIN — OMEPRAZOLE 40 MG: 40 CAPSULE, DELAYED RELEASE ORAL at 05:00

## 2019-01-01 RX ADMIN — ACETAMINOPHEN 1000 MG: 500 TABLET ORAL at 20:15

## 2019-01-01 RX ADMIN — ACETAMINOPHEN 1000 MG: 500 TABLET ORAL at 09:32

## 2019-01-01 RX ADMIN — PANTOPRAZOLE SODIUM 40 MG: 40 TABLET, DELAYED RELEASE ORAL at 05:41

## 2019-01-01 RX ADMIN — Medication 50 MG: at 08:05

## 2019-01-01 RX ADMIN — ALBUTEROL SULFATE 2.5 MG: 2.5 SOLUTION RESPIRATORY (INHALATION) at 08:29

## 2019-01-01 RX ADMIN — INSULIN LISPRO 2 UNITS: 100 INJECTION, SOLUTION INTRAVENOUS; SUBCUTANEOUS at 20:50

## 2019-01-01 RX ADMIN — INSULIN LISPRO 1 UNITS: 100 INJECTION, SOLUTION INTRAVENOUS; SUBCUTANEOUS at 20:13

## 2019-01-01 RX ADMIN — IRON SUCROSE 300 MG: 20 INJECTION, SOLUTION INTRAVENOUS at 14:51

## 2019-01-01 RX ADMIN — HYDRALAZINE HYDROCHLORIDE 50 MG: 50 TABLET ORAL at 09:25

## 2019-01-01 RX ADMIN — ALBUTEROL SULFATE 2.5 MG: 2.5 SOLUTION RESPIRATORY (INHALATION) at 18:15

## 2019-01-01 RX ADMIN — MICONAZOLE NITRATE: 20 POWDER TOPICAL at 09:44

## 2019-01-01 RX ADMIN — PROPOFOL 20 MCG/KG/MIN: 10 INJECTION, EMULSION INTRAVENOUS at 17:53

## 2019-01-01 RX ADMIN — IOHEXOL 1 ML: 180 INJECTION INTRAVENOUS at 10:02

## 2019-01-01 RX ADMIN — DEXAMETHASONE SODIUM PHOSPHATE 4 MG: 4 INJECTION, SOLUTION INTRAMUSCULAR; INTRAVENOUS at 21:05

## 2019-01-01 RX ADMIN — IPRATROPIUM BROMIDE AND ALBUTEROL SULFATE 1 AMPULE: .5; 3 SOLUTION RESPIRATORY (INHALATION) at 11:45

## 2019-01-01 RX ADMIN — CLOPIDOGREL BISULFATE 75 MG: 75 TABLET ORAL at 08:11

## 2019-01-01 RX ADMIN — FAMOTIDINE 20 MG: 10 INJECTION, SOLUTION INTRAVENOUS at 20:43

## 2019-01-01 RX ADMIN — OMEPRAZOLE 40 MG: 40 CAPSULE, DELAYED RELEASE ORAL at 06:09

## 2019-01-01 RX ADMIN — ACETAMINOPHEN 1000 MG: 500 TABLET ORAL at 20:21

## 2019-01-01 RX ADMIN — HEPARIN SODIUM 11 UNITS/KG/HR: 10000 INJECTION, SOLUTION INTRAVENOUS at 05:42

## 2019-01-01 RX ADMIN — HYDRALAZINE HYDROCHLORIDE 50 MG: 50 TABLET ORAL at 01:21

## 2019-01-01 RX ADMIN — METOPROLOL SUCCINATE 50 MG: 50 TABLET, EXTENDED RELEASE ORAL at 10:02

## 2019-01-01 RX ADMIN — LACTULOSE 20 G: 20 SOLUTION ORAL at 21:31

## 2019-01-01 RX ADMIN — Medication 50 MG: at 14:43

## 2019-01-01 RX ADMIN — GABAPENTIN 100 MG: 100 CAPSULE ORAL at 09:10

## 2019-01-01 RX ADMIN — HYDRALAZINE HYDROCHLORIDE 100 MG: 50 TABLET, FILM COATED ORAL at 13:12

## 2019-01-01 RX ADMIN — INSULIN GLARGINE 4 UNITS: 100 INJECTION, SOLUTION SUBCUTANEOUS at 20:24

## 2019-01-01 RX ADMIN — HYDRALAZINE HYDROCHLORIDE 100 MG: 50 TABLET, FILM COATED ORAL at 21:31

## 2019-01-01 RX ADMIN — SODIUM BICARBONATE 1300 MG: 650 TABLET ORAL at 21:11

## 2019-01-01 RX ADMIN — INSULIN GLARGINE 10 UNITS: 100 INJECTION, SOLUTION SUBCUTANEOUS at 20:55

## 2019-01-01 RX ADMIN — INSULIN LISPRO 4 UNITS: 100 INJECTION, SOLUTION INTRAVENOUS; SUBCUTANEOUS at 23:25

## 2019-01-01 RX ADMIN — HYDRALAZINE HYDROCHLORIDE 50 MG: 50 TABLET ORAL at 15:02

## 2019-01-01 RX ADMIN — ISOSORBIDE MONONITRATE 60 MG: 60 TABLET, EXTENDED RELEASE ORAL at 11:01

## 2019-01-01 RX ADMIN — HYDRALAZINE HYDROCHLORIDE 75 MG: 50 TABLET, FILM COATED ORAL at 21:38

## 2019-01-01 RX ADMIN — CLOPIDOGREL BISULFATE 75 MG: 75 TABLET ORAL at 08:49

## 2019-01-01 RX ADMIN — Medication 10 ML: at 08:09

## 2019-01-01 RX ADMIN — Medication 10 ML: at 21:21

## 2019-01-01 RX ADMIN — GABAPENTIN 100 MG: 100 CAPSULE ORAL at 09:36

## 2019-01-01 RX ADMIN — PIPERACILLIN AND TAZOBACTAM 2.25 G: 2; .25 INJECTION, POWDER, LYOPHILIZED, FOR SOLUTION INTRAVENOUS at 03:40

## 2019-01-01 RX ADMIN — ENALAPRILAT 0.62 MG: 2.5 INJECTION INTRAVENOUS at 17:53

## 2019-01-01 RX ADMIN — SODIUM BICARBONATE 1300 MG: 650 TABLET ORAL at 20:06

## 2019-01-01 RX ADMIN — SODIUM BICARBONATE 1300 MG: 650 TABLET ORAL at 08:16

## 2019-01-01 RX ADMIN — LACTULOSE 20 G: 20 SOLUTION ORAL at 14:08

## 2019-01-01 RX ADMIN — ENALAPRILAT 0.62 MG: 2.5 INJECTION INTRAVENOUS at 17:02

## 2019-01-01 RX ADMIN — POLYETHYLENE GLYCOL (3350) 17 G: 17 POWDER, FOR SOLUTION ORAL at 13:26

## 2019-01-01 RX ADMIN — PIPERACILLIN AND TAZOBACTAM 2.25 G: 2; .25 INJECTION, POWDER, LYOPHILIZED, FOR SOLUTION INTRAVENOUS at 11:01

## 2019-01-01 RX ADMIN — ENALAPRILAT 0.62 MG: 2.5 INJECTION INTRAVENOUS at 14:08

## 2019-01-01 RX ADMIN — CLOPIDOGREL BISULFATE 75 MG: 75 TABLET ORAL at 13:33

## 2019-01-01 RX ADMIN — DOXYCYCLINE 100 MG: 100 INJECTION, POWDER, LYOPHILIZED, FOR SOLUTION INTRAVENOUS at 05:43

## 2019-01-01 RX ADMIN — PIPERACILLIN AND TAZOBACTAM 2.25 G: 2; .25 INJECTION, POWDER, LYOPHILIZED, FOR SOLUTION INTRAVENOUS at 17:41

## 2019-01-01 RX ADMIN — POLYETHYLENE GLYCOL (3350) 17 G: 17 POWDER, FOR SOLUTION ORAL at 09:43

## 2019-01-01 RX ADMIN — ALBUTEROL SULFATE 2.5 MG: 2.5 SOLUTION RESPIRATORY (INHALATION) at 16:53

## 2019-01-01 RX ADMIN — PIPERACILLIN AND TAZOBACTAM 2.25 G: 2; .25 INJECTION, POWDER, LYOPHILIZED, FOR SOLUTION INTRAVENOUS at 12:05

## 2019-01-01 RX ADMIN — ISOSORBIDE MONONITRATE 60 MG: 60 TABLET, EXTENDED RELEASE ORAL at 10:15

## 2019-01-01 RX ADMIN — ATORVASTATIN CALCIUM 20 MG: 20 TABLET, FILM COATED ORAL at 20:46

## 2019-01-01 RX ADMIN — DEXAMETHASONE SODIUM PHOSPHATE 2 MG: 4 INJECTION, SOLUTION INTRAMUSCULAR; INTRAVENOUS at 14:15

## 2019-01-01 RX ADMIN — GABAPENTIN 200 MG: 100 CAPSULE ORAL at 20:47

## 2019-01-01 RX ADMIN — FAMOTIDINE 20 MG: 10 INJECTION, SOLUTION INTRAVENOUS at 20:21

## 2019-01-01 RX ADMIN — PIPERACILLIN AND TAZOBACTAM 2.25 G: 2; .25 INJECTION, POWDER, LYOPHILIZED, FOR SOLUTION INTRAVENOUS at 02:45

## 2019-01-01 RX ADMIN — IPRATROPIUM BROMIDE AND ALBUTEROL SULFATE 1 AMPULE: .5; 3 SOLUTION RESPIRATORY (INHALATION) at 15:35

## 2019-01-01 RX ADMIN — ACETAMINOPHEN 1000 MG: 500 TABLET ORAL at 17:56

## 2019-01-01 RX ADMIN — GABAPENTIN 100 MG: 100 CAPSULE ORAL at 09:37

## 2019-01-01 RX ADMIN — GABAPENTIN 100 MG: 100 CAPSULE ORAL at 09:25

## 2019-01-01 RX ADMIN — Medication 50 MG: at 10:27

## 2019-01-01 RX ADMIN — SODIUM CHLORIDE SOLN NEBU 3% 4 ML: 3 NEBU SOLN at 09:42

## 2019-01-01 RX ADMIN — DOXYCYCLINE 100 MG: 100 INJECTION, POWDER, LYOPHILIZED, FOR SOLUTION INTRAVENOUS at 17:39

## 2019-01-01 RX ADMIN — GABAPENTIN 200 MG: 100 CAPSULE ORAL at 20:13

## 2019-01-01 RX ADMIN — ATORVASTATIN CALCIUM 20 MG: 20 TABLET, FILM COATED ORAL at 20:06

## 2019-01-01 RX ADMIN — ACETAMINOPHEN 1000 MG: 500 TABLET ORAL at 20:10

## 2019-01-01 RX ADMIN — INSULIN LISPRO 6 UNITS: 100 INJECTION, SOLUTION INTRAVENOUS; SUBCUTANEOUS at 23:29

## 2019-01-01 RX ADMIN — ACETAMINOPHEN 1000 MG: 500 TABLET ORAL at 02:45

## 2019-01-01 RX ADMIN — ACETAMINOPHEN 1000 MG: 500 TABLET ORAL at 13:25

## 2019-01-01 RX ADMIN — OMEPRAZOLE 40 MG: 40 CAPSULE, DELAYED RELEASE ORAL at 05:44

## 2019-01-01 RX ADMIN — ALBUTEROL SULFATE 2.5 MG: 2.5 SOLUTION RESPIRATORY (INHALATION) at 17:16

## 2019-01-01 RX ADMIN — FENTANYL CITRATE 25 MCG: 50 INJECTION INTRAMUSCULAR; INTRAVENOUS at 19:58

## 2019-01-01 RX ADMIN — INSULIN LISPRO 2 UNITS: 100 INJECTION, SOLUTION INTRAVENOUS; SUBCUTANEOUS at 06:28

## 2019-01-01 RX ADMIN — ATORVASTATIN CALCIUM 20 MG: 20 TABLET, FILM COATED ORAL at 21:21

## 2019-01-01 RX ADMIN — ATORVASTATIN CALCIUM 20 MG: 20 TABLET, FILM COATED ORAL at 21:03

## 2019-01-01 RX ADMIN — HYDRALAZINE HYDROCHLORIDE 10 MG: 20 INJECTION, SOLUTION INTRAMUSCULAR; INTRAVENOUS at 02:01

## 2019-01-01 RX ADMIN — SODIUM CHLORIDE SOLN NEBU 3% 4 ML: 3 NEBU SOLN at 22:14

## 2019-01-01 RX ADMIN — PIPERACILLIN AND TAZOBACTAM 2.25 G: 2; .25 INJECTION, POWDER, LYOPHILIZED, FOR SOLUTION INTRAVENOUS at 11:38

## 2019-01-01 RX ADMIN — ATORVASTATIN CALCIUM 20 MG: 20 TABLET, FILM COATED ORAL at 20:37

## 2019-01-01 RX ADMIN — SODIUM BICARBONATE 1300 MG: 650 TABLET ORAL at 08:46

## 2019-01-01 RX ADMIN — Medication 50 MG: at 22:23

## 2019-01-01 RX ADMIN — POLYETHYLENE GLYCOL (3350) 17 G: 17 POWDER, FOR SOLUTION ORAL at 09:59

## 2019-01-01 RX ADMIN — OXYBUTYNIN CHLORIDE 5 MG: 5 TABLET ORAL at 21:17

## 2019-01-01 RX ADMIN — PIPERACILLIN AND TAZOBACTAM 2.25 G: 2; .25 INJECTION, POWDER, LYOPHILIZED, FOR SOLUTION INTRAVENOUS at 02:11

## 2019-01-01 RX ADMIN — ALBUTEROL SULFATE 2.5 MG: 2.5 SOLUTION RESPIRATORY (INHALATION) at 14:16

## 2019-01-01 RX ADMIN — INSULIN LISPRO 6 UNITS: 100 INJECTION, SOLUTION INTRAVENOUS; SUBCUTANEOUS at 18:32

## 2019-01-01 RX ADMIN — OXYBUTYNIN CHLORIDE 5 MG: 5 TABLET ORAL at 13:33

## 2019-01-01 ASSESSMENT — ENCOUNTER SYMPTOMS
EYE ITCHING: 0
VOMITING: 0
EYE REDNESS: 0
NAUSEA: 0
ANAL BLEEDING: 0
DIARRHEA: 0
CONSTIPATION: 1
SORE THROAT: 0
SHORTNESS OF BREATH: 0
ORTHOPNEA: 0
EYE PAIN: 0
BLOOD IN STOOL: 0
SHORTNESS OF BREATH: 0
ABDOMINAL PAIN: 0
APNEA: 0
BACK PAIN: 1
ORTHOPNEA: 0
BACK PAIN: 0
DIARRHEA: 1
SHORTNESS OF BREATH: 1
DIARRHEA: 1
EYE DISCHARGE: 0
DIARRHEA: 1
NAUSEA: 0
SHORTNESS OF BREATH: 1
COUGH: 1
ABDOMINAL DISTENTION: 0
BLURRED VISION: 0
EYES NEGATIVE: 1
RHINORRHEA: 0
BLOOD IN STOOL: 0
COUGH: 0
BLURRED VISION: 0
CHOKING: 0
CHEST TIGHTNESS: 0
RHINORRHEA: 0
SHORTNESS OF BREATH: 0
NAUSEA: 0
NAUSEA: 0
COUGH: 0
CONSTIPATION: 0
VOMITING: 0
VOMITING: 0
SORE THROAT: 0
PHOTOPHOBIA: 0
VOMITING: 1
EYES NEGATIVE: 1
BACK PAIN: 1
EYE REDNESS: 0
COUGH: 1
EYE PAIN: 0
EYE PAIN: 0
BACK PAIN: 1
WHEEZING: 0
SINUS PRESSURE: 0
EYE REDNESS: 0
WHEEZING: 0
BLOOD IN STOOL: 0
EYE PAIN: 0
ABDOMINAL PAIN: 1
CONSTIPATION: 1

## 2019-01-01 ASSESSMENT — PAIN SCALES - GENERAL
PAINLEVEL_OUTOF10: 5
PAINLEVEL_OUTOF10: 2
PAINLEVEL_OUTOF10: 3
PAINLEVEL_OUTOF10: 0
PAINLEVEL_OUTOF10: 5
PAINLEVEL_OUTOF10: 8
PAINLEVEL_OUTOF10: 6
PAINLEVEL_OUTOF10: 9
PAINLEVEL_OUTOF10: 8
PAINLEVEL_OUTOF10: 0
PAINLEVEL_OUTOF10: 6
PAINLEVEL_OUTOF10: 0
PAINLEVEL_OUTOF10: 6
PAINLEVEL_OUTOF10: 10
PAINLEVEL_OUTOF10: 8
PAINLEVEL_OUTOF10: 8
PAINLEVEL_OUTOF10: 2
PAINLEVEL_OUTOF10: 7
PAINLEVEL_OUTOF10: 7
PAINLEVEL_OUTOF10: 10
PAINLEVEL_OUTOF10: 9
PAINLEVEL_OUTOF10: 9
PAINLEVEL_OUTOF10: 6
PAINLEVEL_OUTOF10: 0
PAINLEVEL_OUTOF10: 0
PAINLEVEL_OUTOF10: 8
PAINLEVEL_OUTOF10: 10
PAINLEVEL_OUTOF10: 10
PAINLEVEL_OUTOF10: 3
PAINLEVEL_OUTOF10: 5
PAINLEVEL_OUTOF10: 8
PAINLEVEL_OUTOF10: 10
PAINLEVEL_OUTOF10: 5
PAINLEVEL_OUTOF10: 0
PAINLEVEL_OUTOF10: 0
PAINLEVEL_OUTOF10: 8
PAINLEVEL_OUTOF10: 5
PAINLEVEL_OUTOF10: 0
PAINLEVEL_OUTOF10: 4
PAINLEVEL_OUTOF10: 4
PAINLEVEL_OUTOF10: 0
PAINLEVEL_OUTOF10: 4
PAINLEVEL_OUTOF10: 2
PAINLEVEL_OUTOF10: 0
PAINLEVEL_OUTOF10: 4
PAINLEVEL_OUTOF10: 0
PAINLEVEL_OUTOF10: 0
PAINLEVEL_OUTOF10: 4
PAINLEVEL_OUTOF10: 6
PAINLEVEL_OUTOF10: 0
PAINLEVEL_OUTOF10: 7
PAINLEVEL_OUTOF10: 0
PAINLEVEL_OUTOF10: 9
PAINLEVEL_OUTOF10: 8
PAINLEVEL_OUTOF10: 0
PAINLEVEL_OUTOF10: 0
PAINLEVEL_OUTOF10: 2
PAINLEVEL_OUTOF10: 5
PAINLEVEL_OUTOF10: 0
PAINLEVEL_OUTOF10: 2
PAINLEVEL_OUTOF10: 0
PAINLEVEL_OUTOF10: 8
PAINLEVEL_OUTOF10: 1
PAINLEVEL_OUTOF10: 8
PAINLEVEL_OUTOF10: 3
PAINLEVEL_OUTOF10: 9
PAINLEVEL_OUTOF10: 0
PAINLEVEL_OUTOF10: 0
PAINLEVEL_OUTOF10: 5
PAINLEVEL_OUTOF10: 10
PAINLEVEL_OUTOF10: 0
PAINLEVEL_OUTOF10: 8
PAINLEVEL_OUTOF10: 0
PAINLEVEL_OUTOF10: 0
PAINLEVEL_OUTOF10: 8
PAINLEVEL_OUTOF10: 8
PAINLEVEL_OUTOF10: 10
PAINLEVEL_OUTOF10: 7
PAINLEVEL_OUTOF10: 2
PAINLEVEL_OUTOF10: 8
PAINLEVEL_OUTOF10: 0
PAINLEVEL_OUTOF10: 5
PAINLEVEL_OUTOF10: 0
PAINLEVEL_OUTOF10: 5
PAINLEVEL_OUTOF10: 9
PAINLEVEL_OUTOF10: 7
PAINLEVEL_OUTOF10: 0
PAINLEVEL_OUTOF10: 0
PAINLEVEL_OUTOF10: 2
PAINLEVEL_OUTOF10: 0
PAINLEVEL_OUTOF10: 8
PAINLEVEL_OUTOF10: 0
PAINLEVEL_OUTOF10: 7
PAINLEVEL_OUTOF10: 8
PAINLEVEL_OUTOF10: 0
PAINLEVEL_OUTOF10: 8
PAINLEVEL_OUTOF10: 3
PAINLEVEL_OUTOF10: 10
PAINLEVEL_OUTOF10: 5
PAINLEVEL_OUTOF10: 9
PAINLEVEL_OUTOF10: 4
PAINLEVEL_OUTOF10: 10
PAINLEVEL_OUTOF10: 8
PAINLEVEL_OUTOF10: 0
PAINLEVEL_OUTOF10: 0
PAINLEVEL_OUTOF10: 8
PAINLEVEL_OUTOF10: 5
PAINLEVEL_OUTOF10: 6
PAINLEVEL_OUTOF10: 0
PAINLEVEL_OUTOF10: 8
PAINLEVEL_OUTOF10: 0
PAINLEVEL_OUTOF10: 8
PAINLEVEL_OUTOF10: 4
PAINLEVEL_OUTOF10: 8
PAINLEVEL_OUTOF10: 0

## 2019-01-01 ASSESSMENT — PAIN DESCRIPTION - ORIENTATION
ORIENTATION: RIGHT
ORIENTATION: MID;LOWER
ORIENTATION: RIGHT
ORIENTATION: RIGHT;LEFT
ORIENTATION: MID;RIGHT;LOWER
ORIENTATION: RIGHT
ORIENTATION: RIGHT;LEFT
ORIENTATION: LOWER;MID
ORIENTATION: RIGHT
ORIENTATION: LOWER
ORIENTATION: RIGHT;LEFT
ORIENTATION: RIGHT
ORIENTATION: RIGHT
ORIENTATION: RIGHT;LEFT
ORIENTATION: LOWER

## 2019-01-01 ASSESSMENT — PAIN DESCRIPTION - ONSET
ONSET: ON-GOING
ONSET: GRADUAL
ONSET: ON-GOING
ONSET: GRADUAL
ONSET: ON-GOING
ONSET: GRADUAL
ONSET: ON-GOING

## 2019-01-01 ASSESSMENT — PAIN DESCRIPTION - FREQUENCY
FREQUENCY: INTERMITTENT
FREQUENCY: CONTINUOUS
FREQUENCY: INTERMITTENT
FREQUENCY: CONTINUOUS
FREQUENCY: INTERMITTENT
FREQUENCY: CONTINUOUS
FREQUENCY: INTERMITTENT
FREQUENCY: CONTINUOUS
FREQUENCY: INTERMITTENT
FREQUENCY: CONTINUOUS
FREQUENCY: CONTINUOUS
FREQUENCY: INTERMITTENT
FREQUENCY: CONTINUOUS
FREQUENCY: INTERMITTENT
FREQUENCY: INTERMITTENT

## 2019-01-01 ASSESSMENT — PAIN DESCRIPTION - PAIN TYPE
TYPE: CHRONIC PAIN
TYPE: ACUTE PAIN
TYPE: CHRONIC PAIN
TYPE: CHRONIC PAIN
TYPE: ACUTE PAIN;CHRONIC PAIN
TYPE: ACUTE PAIN
TYPE: CHRONIC PAIN
TYPE: CHRONIC PAIN
TYPE: ACUTE PAIN
TYPE: CHRONIC PAIN
TYPE: CHRONIC PAIN
TYPE: ACUTE PAIN
TYPE: CHRONIC PAIN
TYPE: ACUTE PAIN
TYPE: ACUTE PAIN
TYPE: CHRONIC PAIN
TYPE: ACUTE PAIN
TYPE: CHRONIC PAIN
TYPE: ACUTE PAIN

## 2019-01-01 ASSESSMENT — PAIN - FUNCTIONAL ASSESSMENT
PAIN_FUNCTIONAL_ASSESSMENT: 0-10
PAIN_FUNCTIONAL_ASSESSMENT: PREVENTS OR INTERFERES WITH MANY ACTIVE NOT PASSIVE ACTIVITIES
PAIN_FUNCTIONAL_ASSESSMENT: PREVENTS OR INTERFERES SOME ACTIVE ACTIVITIES AND ADLS
PAIN_FUNCTIONAL_ASSESSMENT: ACTIVITIES ARE NOT PREVENTED
PAIN_FUNCTIONAL_ASSESSMENT: PREVENTS OR INTERFERES WITH ALL ACTIVE AND SOME PASSIVE ACTIVITIES
PAIN_FUNCTIONAL_ASSESSMENT: PREVENTS OR INTERFERES SOME ACTIVE ACTIVITIES AND ADLS
PAIN_FUNCTIONAL_ASSESSMENT: 0-10
PAIN_FUNCTIONAL_ASSESSMENT: PREVENTS OR INTERFERES WITH ALL ACTIVE AND SOME PASSIVE ACTIVITIES
PAIN_FUNCTIONAL_ASSESSMENT: PREVENTS OR INTERFERES SOME ACTIVE ACTIVITIES AND ADLS
PAIN_FUNCTIONAL_ASSESSMENT: ACTIVITIES ARE NOT PREVENTED
PAIN_FUNCTIONAL_ASSESSMENT: PREVENTS OR INTERFERES SOME ACTIVE ACTIVITIES AND ADLS
PAIN_FUNCTIONAL_ASSESSMENT: PREVENTS OR INTERFERES WITH ALL ACTIVE AND SOME PASSIVE ACTIVITIES
PAIN_FUNCTIONAL_ASSESSMENT: ACTIVITIES ARE NOT PREVENTED
PAIN_FUNCTIONAL_ASSESSMENT: 0-10
PAIN_FUNCTIONAL_ASSESSMENT: PREVENTS OR INTERFERES WITH MANY ACTIVE NOT PASSIVE ACTIVITIES
PAIN_FUNCTIONAL_ASSESSMENT: PREVENTS OR INTERFERES SOME ACTIVE ACTIVITIES AND ADLS
PAIN_FUNCTIONAL_ASSESSMENT: PREVENTS OR INTERFERES WITH MANY ACTIVE NOT PASSIVE ACTIVITIES
PAIN_FUNCTIONAL_ASSESSMENT: PREVENTS OR INTERFERES SOME ACTIVE ACTIVITIES AND ADLS
PAIN_FUNCTIONAL_ASSESSMENT: ACTIVITIES ARE NOT PREVENTED
PAIN_FUNCTIONAL_ASSESSMENT: ACTIVITIES ARE NOT PREVENTED
PAIN_FUNCTIONAL_ASSESSMENT: PREVENTS OR INTERFERES WITH MANY ACTIVE NOT PASSIVE ACTIVITIES

## 2019-01-01 ASSESSMENT — PAIN DESCRIPTION - LOCATION
LOCATION: BACK
LOCATION: LEG
LOCATION: BACK
LOCATION: LEG
LOCATION: BACK
LOCATION: GROIN
LOCATION: BACK
LOCATION: GROIN
LOCATION: BACK
LOCATION: LEG
LOCATION: BACK
LOCATION: GROIN
LOCATION: BACK
LOCATION: BACK;GROIN
LOCATION: BACK
LOCATION: BACK
LOCATION: BACK;GROIN

## 2019-01-01 ASSESSMENT — PULMONARY FUNCTION TESTS
PIF_VALUE: 24
PIF_VALUE: 28
PIF_VALUE: 29
PIF_VALUE: 31
PIF_VALUE: 30
PIF_VALUE: 30
PIF_VALUE: 28
PIF_VALUE: 31
PIF_VALUE: 30
PIF_VALUE: 23
PIF_VALUE: 27
PIF_VALUE: 15
PIF_VALUE: 31
PIF_VALUE: 32
PIF_VALUE: 30
PIF_VALUE: 29
PIF_VALUE: 29

## 2019-01-01 ASSESSMENT — PAIN DESCRIPTION - PROGRESSION
CLINICAL_PROGRESSION: NOT CHANGED
CLINICAL_PROGRESSION: GRADUALLY IMPROVING
CLINICAL_PROGRESSION: NOT CHANGED
CLINICAL_PROGRESSION: GRADUALLY WORSENING
CLINICAL_PROGRESSION: NOT CHANGED
CLINICAL_PROGRESSION: RESOLVED
CLINICAL_PROGRESSION: NOT CHANGED
CLINICAL_PROGRESSION: GRADUALLY WORSENING
CLINICAL_PROGRESSION: NOT CHANGED
CLINICAL_PROGRESSION: NOT CHANGED
CLINICAL_PROGRESSION: GRADUALLY WORSENING
CLINICAL_PROGRESSION: NOT CHANGED
CLINICAL_PROGRESSION: NOT CHANGED
CLINICAL_PROGRESSION: RAPIDLY WORSENING
CLINICAL_PROGRESSION: NOT CHANGED
CLINICAL_PROGRESSION: GRADUALLY IMPROVING
CLINICAL_PROGRESSION: NOT CHANGED
CLINICAL_PROGRESSION: NOT CHANGED
CLINICAL_PROGRESSION: GRADUALLY IMPROVING
CLINICAL_PROGRESSION: NOT CHANGED
CLINICAL_PROGRESSION: NOT CHANGED
CLINICAL_PROGRESSION: GRADUALLY WORSENING
CLINICAL_PROGRESSION: NOT CHANGED

## 2019-01-01 ASSESSMENT — PAIN DESCRIPTION - DIRECTION
RADIATING_TOWARDS: TO LEFT SIDE
RADIATING_TOWARDS: PELVIS
RADIATING_TOWARDS: TO LEFT SIDE

## 2019-01-01 ASSESSMENT — PAIN DESCRIPTION - DESCRIPTORS
DESCRIPTORS: THROBBING
DESCRIPTORS: RADIATING
DESCRIPTORS: ACHING
DESCRIPTORS: DISCOMFORT
DESCRIPTORS: ACHING
DESCRIPTORS: ACHING
DESCRIPTORS: THROBBING
DESCRIPTORS: ACHING
DESCRIPTORS: THROBBING
DESCRIPTORS: THROBBING
DESCRIPTORS: ACHING
DESCRIPTORS: DISCOMFORT

## 2019-01-01 ASSESSMENT — PAIN SCALES - WONG BAKER
WONGBAKER_NUMERICALRESPONSE: 8
WONGBAKER_NUMERICALRESPONSE: 0
WONGBAKER_NUMERICALRESPONSE: 8

## 2019-01-01 ASSESSMENT — JOINT PAIN
TOTAL NUMBER OF SWOLLEN JOINTS: 3
TOTAL NUMBER OF TENDER JOINTS: 18

## 2019-03-14 NOTE — PROGRESS NOTES
Renal Progress Note    Assessment and Plan:      Diagnosis Orders   1. Stage 4 chronic kidney disease (HCC)  Basic Metabolic Panel    Hemoglobin and Hematocrit, Blood    Phosphorus    Vitamin D 25 Hydroxy    PTH, Intact   2. Essential hypertension     3. Vitamin D deficiency     4. Anemia of chronic disease  DISCONTINUED: darbepoetin leona-polysorbate (ARANESP) injection 40 mcg   5. Chronic diastolic heart failure ([facility] [address]     6. Chronic kidney disease, stage IV (severe) ([facility] [address]     7. Iron deficiency anemia due to dietary causes  Ferritin    Iron    IRON SATURATION     Quality & Risk Score Accuracy    Visit Dx:  N18.4 - Chronic kidney disease, stage IV (severe) ([facility])  Assessment and plan:  Stable based upon symptoms and exam. Continue current treatment plan and follow up at least yearly. Last edited 03/14/19 15:23 EDT by LUIZ Gutiérrez CNP     PLAN:  Discussed my thought processes with the patient and family. They understood. Serum creatinine is improved. Medications reviewed. No changes. Return visit in 6 months.       Patient Active Problem List   Diagnosis    Prerenal acute renal failure ([facility] [address]    Essential hypertension    Atrial fib/flutter, transient    DJD (degenerative joint disease)    Renal artery stenosis ([facility])    HLD (hyperlipidemia)    DM type 2 (diabetes mellitus, type 2) ([facility])    Anemia    CKD (chronic kidney disease) stage 3, GFR 30-59 ml/min ([facility])    Coronary artery disease without angina pectoris    Headache    Abnormal EKG    Cardiovascular risk factor    Angina, class II ([facility] [address]    DM (diabetes mellitus) ([facility] [address]    Dyslipidemia    S/P angioplasty with stent    Presence of stent in left circumflex coronary artery    Presence of stent in right coronary artery    Type 2 diabetes mellitus with complication ([facility])    Renovascular hypertension    Hypertensive crisis    TURNER (acute kidney injury) ([facility] [address]    DVT (deep venous thrombosis) ([facility])    Urge incontinence of urine    Hypertensive emergency    Hypertensive heart disease with heart failure (HCC)    Acute on chronic congestive heart failure (HCC)    CKD (chronic kidney disease)    Coronary artery disease involving native coronary artery of native heart without angina pectoris    Acute diastolic congestive heart failure (HCC)    Accelerated hypertension    Pneumonia of both lungs due to infectious organism    Wheezing    Interstitial nephritis, acute    Acidosis    Anemia of chronic kidney failure    Pneumonia of both lower lobes due to infectious organism ([address]    Hypervolemia    Constipation by delayed colonic transit    Metabolic alkalosis    Severe dehydration    Diabetes mellitus with hyperglycemia (HCC)    TURNER (acute kidney injury) ([address]    Anemia of chronic disease    CKD (chronic kidney disease) stage 4, GFR 15-29 ml/min (HCC)    Osteopenia    Vitamin D deficiency    Anemia of chronic disease    Volume overload    Left atrial dilation    SOB (shortness of breath)    Moderate malnutrition (HCC)    Chronic diastolic congestive heart failure (HCC)    Class 1 obesity in adult    Acute respiratory failure with hypoxia (HCC)    Acute renal failure superimposed on stage 3 chronic kidney disease (HCC)    Metabolic acidosis    Lung nodule    Incidental lung nodule, > 3mm and < 8mm    TURNER (acute kidney injury) ([facility])       Subjective:   Chief complaint:  Chief Complaint   Patient presents with    Chronic Kidney Disease     Stage IV      HPI:This is a follow up visit for Mrs. Bill Arreola who is here today for return appointment. I see how for chronic kidney disease. She was last in about 6 months ago. Being well with no complaint. She is accompanied by her family.     ROS:Constitutional: negative  Eyes: negative  Ears, nose, mouth, throat, and face: negative  Respiratory: negative  Cardiovascular: negative  Gastrointestinal: negative  Genitourinary:negative  Integument/breast: negative  Hematologic/lymphatic: negative  Musculoskeletal:positive for arthralgias and stiff joints  Neurological: positive for coordination problems and gait problems  Behavioral/Psych: negative  Endocrine: negative  Allergic/Immunologic: negative  Medications:     Current Outpatient Medications   Medication Sig Dispense Refill    amoxicillin-clavulanate (AUGMENTIN) 875-125 MG per tablet TAKE 1 TABLET BY MOUTH EVERY 12 HOURS FOR 10 DAYS  0    allopurinol (ZYLOPRIM) 300 MG tablet Take 0.5 tablets by mouth daily 30 tablet 3    isosorbide mononitrate (IMDUR) 60 MG extended release tablet Take 60 mg by mouth daily      atorvastatin (LIPITOR) 20 MG tablet Take 1 tablet by mouth daily 30 tablet 5    levothyroxine (SYNTHROID) 112 MCG tablet Take 1 tablet by mouth Daily 30 tablet 3    furosemide (LASIX) 40 MG tablet Take 1 tablet by mouth daily 60 tablet 3    acetaminophen (TYLENOL) 500 MG tablet Take 500 mg by mouth every 6 hours as needed for Pain      losartan (COZAAR) 100 MG tablet Take 1 tablet by mouth daily 90 tablet 3    Cholecalciferol (VITAMIN D3) 5000 units CAPS Take 1 capsule by mouth daily (Patient taking differently: Take 5,000 Units by mouth three times a week )      metoprolol succinate (TOPROL XL) 50 MG extended release tablet Take 50 mg by mouth daily      oxybutynin (DITROPAN) 5 MG tablet Take 5 mg by mouth 2 times daily       famotidine (PEPCID) 20 MG tablet Take 20 mg by mouth daily as needed      hydrALAZINE (APRESOLINE) 100 MG tablet Take 1 tablet by mouth 3 times daily 90 tablet 3    LUTEIN-ZEAXANTHIN PO Take 20 mg by mouth nightly       clopidogrel (PLAVIX) 75 MG tablet Take 1 tablet by mouth daily 90 tablet 3    glipiZIDE (GLUCOTROL) 5 MG tablet Take 5 mg by mouth daily      aspirin 81 MG tablet Take 81 mg by mouth daily. No current facility-administered medications for this visit.         Lab Results:    CBC:   Lab Results   Component Value Date    WBC 7.4 08/28/2018    HGB 10.6 (A) 04/16/2019    HCT 31.0 (A) 04/16/2019    MCV 97.5 08/28/2018     08/28/2018     BMP:    Lab Results   Component Value Date     03/11/2019     12/10/2018     10/10/2018    K 4.2 03/11/2019    K 4.9 12/10/2018    K 4.2 10/10/2018     03/11/2019     12/10/2018     10/10/2018    CO2 26 03/11/2019    CO2 25 12/10/2018    CO2 24 10/10/2018    BUN 54 (H) 03/11/2019    BUN 74 (H) 12/10/2018    BUN 75 (H) 10/10/2018    CREATININE 2.2 (H) 03/11/2019    CREATININE 2.5 (H) 12/10/2018    CREATININE 3.0 (H) 10/10/2018    GLUCOSE 195 (H) 03/11/2019    GLUCOSE 175 (H) 12/10/2018    GLUCOSE 80 10/10/2018      Hepatic:   Lab Results   Component Value Date    AST 14 10/10/2018    AST 14 09/11/2018    AST 11 08/28/2018    ALT 5 10/10/2018    ALT 7 09/11/2018    ALT <5 (L) 08/28/2018    BILITOT 0.6 10/10/2018    BILITOT 0.6 09/11/2018    BILITOT 0.7 08/28/2018    ALKPHOS 62 10/10/2018    ALKPHOS 61 09/11/2018    ALKPHOS 56 08/28/2018     BNP: No results found for: BNP  Lipids:   Lab Results   Component Value Date    CHOL 179 10/10/2018    HDL 38.4 (L) 10/10/2018     INR:   Lab Results   Component Value Date    INR 1.02 08/28/2018    INR 0.98 07/30/2018    INR 0.94 12/23/2016     URINE:   Lab Results   Component Value Date    NAUR 25 12/28/2016     Lab Results   Component Value Date    NITRU NEGATIVE 08/28/2018    COLORU YELLOW 08/28/2018    PHUR 5.0 08/28/2018    WBCUA 0-2 08/28/2018    RBCUA NONE SEEN 08/28/2018    YEAST NONE SEEN 08/28/2018    BACTERIA NONE 08/28/2018    LEUKOCYTESUR NEGATIVE 08/28/2018    UROBILINOGEN 0.2 08/28/2018    BILIRUBINUR NEGATIVE 08/28/2018    BLOODU NEGATIVE 08/28/2018    GLUCOSEU NEGATIVE 08/28/2018    KETUA NEGATIVE 08/28/2018      Microalbumen/Creatinine ratio:  No components found for: RUCREAT    Objective:   Vitals: BP (!) 154/78 (Site: Right Upper Arm, Position: Sitting, Cuff Size: Large Adult)   Pulse 62   Wt 202 lb (91.6 kg)   SpO2 96%   BMI 35.78 kg/m²      Constitutional:  Alert, awake, no apparent distress  Skin:normal  HEENT:Pupils are reactive . Throat is clear  Neck:supple with no thyromegally  Cardiovascular:  S1, S2 without m/r/g  Respiratory:  CTA B without w/r/r  Abdomen: +bs, soft, nt  Ext: No LE edema  Musculoskeletal:Intact  Neuro:Alert and oriented with no deficit    Electronically signed by Carlitos Bowling MD on 5/9/2019 at 12:57 PM

## 2019-03-14 NOTE — PATIENT INSTRUCTIONS
Stage 4 chronic kidney disease ([facility] [address] Overall stable   Ok to take ditropan previously ordered by Alka Islas MD    Decrease Allopurinol from 300 to 150 mg daily   Discussed with Dr Elizabeth Parker hypertension At control    Vitamin D deficiency, Continue current dose    Anemia of chronic disease, Iron stores ok. Start Aranesp 40 mcg today. Repeat H&H in one month. Goal Hgb 10-11. Chronic Care Management consent signed. Chronic diastolic heart failure (HCC)      Orders Placed This Encounter   Procedures    Basic Metabolic Panel    Hemoglobin and Hematocrit, Blood    Phosphorus    Vitamin D 25 Hydroxy    PTH, Intact    Ferritin    Iron    IRON SATURATION    Hemoglobin and Hematocrit, Blood       Pt asked to check home BP and record BP readings and bring to office appts  Avoid nonsteroidal anti inflammatory drugs such as Ibuprofen, Aleve, Advil, Motrin. Schedule for follow up appt in 3 months. Pt asked to bring pill bottles to next office visit. Please make early appointment if needed and to call office for questions, concerns, persistent, changing or worsening symptoms. Discussed with the patient and answered their questions   Discussed with Dr Eugenia Avina.   Christophe DEE, CNP

## 2019-03-14 NOTE — PROGRESS NOTES
Aretha Godfrey is a 80 y. o.oldfemale came for follow up regarding her chronic kidney disease, Stage IV of several year duration. Gian Borden's most recent creatinine is 2.2 and has been stable. States doing well. State compliance with meds and denies adverse effects. The pt does check home BP. Running 150/-160/. Denies dysuria, except incontinence. denies edema. Denies new issues with Diabetes, except c/o of neuropathy. The pt denies use of NSAIDs. Has a good appetite and is remaining active. Amb with walker    Recent Wts and BP Noted and Reviewed  Wt Readings from Last 3 Encounters:   03/14/19 202 lb (91.6 kg)   01/03/19 194 lb (88 kg)   12/13/18 194 lb 3.2 oz (88.1 kg)     BP Readings from Last 3 Encounters:   03/14/19 (!) 154/78   01/03/19 138/62   12/13/18 (!) 147/67       Body mass index is 35.78 kg/m².   Diagnostic Data:  Lab Results   Component Value Date    CREATININE 2.2 03/11/2019    CREATININE 2.5 12/10/2018    CREATININE 3.0 10/10/2018    CREATININE 2.4 09/17/2018    CREATININE 2.6 09/11/2018    CREATININE 2.7 09/11/2018     Lab Results   Component Value Date     03/11/2019    K 4.2 03/11/2019    K 4.5 08/28/2018     03/11/2019    CO2 26 03/11/2019    BUN 54 03/11/2019    CREATININE 2.2 03/11/2019    LABGLOM 20 08/31/2018    GLUCOSE 195 03/11/2019     Lab Results   Component Value Date    PHOS 3.0 12/10/2018    CALCIUM 8.3 03/11/2019     Lab Results   Component Value Date     12/10/2018     Lab Results   Component Value Date    VITD25 43 03/11/2019     Lab Results   Component Value Date    HGB 9.4 (L) 03/11/2019    HCT 27.8 (L) 03/11/2019     Lab Results   Component Value Date    FERRITIN 74.26 03/11/2019    IRON 77 02/26/2016    LABIRON 31 03/11/2019     No results found for: RETICABS  Lab Results   Component Value Date    LABA1C 6.2 08/28/2018           PAST MEDICAL HISTORY:       Diagnosis Date    Anemia     Atrial fib/flutter, transient 2000    Atrial fibrillation ([facility] [address]     CHF (congestive heart failure) ([facility] [address]     DJD (degenerative joint disease)     DM type 2 (diabetes mellitus, type 2) ([facility] [address]     Gout     HLD (hyperlipidemia)     HTN (hypertension)     Hypothyroidism     Incontinence     Obesity     Pneumonia     Renal artery stenosis (HCC)     Renal insufficiency      SURGICAL HISTORY:    Past Surgical History:   Procedure Laterality Date    ACHILLES TENDON SURGERY  3/2004    ANUS SURGERY  2010    BACK SURGERY      BLADDER REPAIR  [id number]    CARDIAC CATHETERIZATION  07/30/2018    CARDIAC SURGERY      heart cath    CATARACT REMOVAL Right 12/2007    CATARACT REMOVAL Left 1/2008    CORONARY ANGIOPLASTY WITH STENT PLACEMENT  2015    LAD, RCA    COSMETIC SURGERY  5/2008    eyelid    EKG 12-LEAD  4/25/2015         ENDOSCOPY, COLON, DIAGNOSTIC      EYE SURGERY      FINGER SURGERY Left 8/2010    left index    FOOT SURGERY  10/2004    HYSTERECTOMY  1978    JOINT REPLACEMENT      both knees    KNEE ARTHROPLASTY      R 2/98, L 10/98    LUMBAR SPINE SURGERY  3/4/05    REFRACTIVE SURGERY  2012    THYROIDECTOMY  1958    UPPER GASTROINTESTINAL ENDOSCOPY  2015    Dr. Bozena Ashby:       Problem Relation Age of Onset    Cancer Mother         breast    Heart Disease Mother         murmur    Arthritis Mother         rheumatism    Arthritis Father         rheumatism    Cancer Brother         jaw    Diabetes Neg Hx     High Blood Pressure Neg Hx     Stroke Neg Hx      ALLERGIES:    Allergies   Allergen Reactions    Atarax [Hydroxyzine] Other (See Comments)     Unsure of reaction     Biaxin [Clarithromycin]     Ceclor [Cefaclor] Other (See Comments)     Looney for 2 days     Oxycontin [Oxycodone] Other (See Comments)     dizziness    Tape [Adhesive Tape]      Tears skin    Tylenol With Codeine #3 [Acetaminophen-Codeine]     Ultram [Tramadol] Other (See Comments)     dizziness    Vioxx [Rofecoxib] Other (See Comments) dizziness    Advil [Ibuprofen] Nausea And Vomiting and Other (See Comments)     Was told never to take    Bumex [Bumetanide] Nausea And Vomiting    Codeine Other (See Comments)     Inability to focus, glassed over eyes patient states    Darvocet A500 [Propoxyphene N-Acetaminophen] Nausea And Vomiting and Other (See Comments)     dizziness    Darvon [Fd&C Red #40-Fd&C Yellow #10-Propoxyphene] Nausea And Vomiting and Other (See Comments)     dizziness     Social and Occupational History:    TOBACCO:   reports that she has never smoked. She has never used smokeless tobacco.  ETOH:   reports that she does not drink alcohol. REVIEW OF SYSTEMS:   GENERAL: Usual state of health. Stable weight. Pleasant  HEENT: Denies recent vision change, Denies Upper respiratory complaints  CARDIOVASCULAR: Denies chest pain/angina. RESPIRATORY:Denies sob, cough, wheezing  ABDOMEN: Denies nausea, vomiting, diarrhea  CNS:Denies headache, dizziness  MUSCULOSKELETAL: Reports joint arthralgia  SKIN: Denies rash, pruritis  HEMATOLOGIC: Denies bruising  ENDOCRINE:  Negative for heat or cold intolerance         EXAM:  VITALS:  BP (!) 154/78 (Site: Right Upper Arm, Position: Sitting, Cuff Size: Large Adult)   Pulse 62   Wt 202 lb (91.6 kg)   SpO2 96%   BMI 35.78 kg/m²    Body mass index is 35.78 kg/m². CONSTITUTIONAL:  No acute distress. Sitting comfortable in chair  HEENT:  Head is normocephalic, Extraocular movement intact,  Neck is supple  CARDIOVASCULAR:  no lower extremity edema  RESPIRATORY: No shortness of breath. ABDOMEN: soft  NEUROLOGICAL: Patient is alert and oriented to person, place, and time. Recent and remote memory is intact. Thought is coherant. SKIN: No rash on exposed surfaces  MUSCULOSKELETAL: Movement is coordinated.    EXTREMITIES: Distal lower extremity temp is warm,   PSYCHIATRIC: mood and affect appropriate    Meds reviewed and discussed with pt  Current Outpatient Medications   Medication Sig Dispense Refill    amoxicillin-clavulanate (AUGMENTIN) 875-125 MG per tablet TAKE 1 TABLET BY MOUTH EVERY 12 HOURS FOR 10 DAYS  0    allopurinol (ZYLOPRIM) 300 MG tablet Take 0.5 tablets by mouth daily 30 tablet 3    isosorbide mononitrate (IMDUR) 60 MG extended release tablet Take 60 mg by mouth daily      atorvastatin (LIPITOR) 20 MG tablet Take 1 tablet by mouth daily 30 tablet 5    levothyroxine (SYNTHROID) 112 MCG tablet Take 1 tablet by mouth Daily 30 tablet 3    furosemide (LASIX) 40 MG tablet Take 1 tablet by mouth daily 60 tablet 3    acetaminophen (TYLENOL) 500 MG tablet Take 500 mg by mouth every 6 hours as needed for Pain      losartan (COZAAR) 100 MG tablet Take 1 tablet by mouth daily 90 tablet 3    Cholecalciferol (VITAMIN D3) 5000 units CAPS Take 1 capsule by mouth daily (Patient taking differently: Take 5,000 Units by mouth three times a week )      metoprolol succinate (TOPROL XL) 50 MG extended release tablet Take 50 mg by mouth daily      oxybutynin (DITROPAN) 5 MG tablet Take 5 mg by mouth 2 times daily       famotidine (PEPCID) 20 MG tablet Take 20 mg by mouth daily as needed      hydrALAZINE (APRESOLINE) 100 MG tablet Take 1 tablet by mouth 3 times daily 90 tablet 3    LUTEIN-ZEAXANTHIN PO Take 20 mg by mouth nightly       clopidogrel (PLAVIX) 75 MG tablet Take 1 tablet by mouth daily 90 tablet 3    glipiZIDE (GLUCOTROL) 5 MG tablet Take 5 mg by mouth daily      aspirin 81 MG tablet Take 81 mg by mouth daily. No current facility-administered medications for this visit. Assessment:    Diagnoses and all orders for this visit:    Stage 4 chronic kidney disease ([facility] [address] Overall stable   Ok to take ditropan previously ordered by Ilana Salcido MD    Decrease Allopurinol from 300 to 150 mg daily   Discussed with Dr Nate Burger hypertension At control    Vitamin D deficiency, Continue current dose    Anemia of chronic disease, Iron stores ok. Start Aranesp 40 mcg today. Repeat H&H in one month. Goal Hgb 10-11. Chronic Care Management consent signed. Chronic diastolic heart failure (HCC)      Orders Placed This Encounter   Procedures    Basic Metabolic Panel    Hemoglobin and Hematocrit, Blood    Phosphorus    Vitamin D 25 Hydroxy    PTH, Intact    Ferritin    Iron    IRON SATURATION    Hemoglobin and Hematocrit, Blood       Pt asked to check home BP and record BP readings and bring to office appts  Avoid nonsteroidal anti inflammatory drugs such as Ibuprofen, Aleve, Advil, Motrin. Schedule for follow up appt in 3 months. Pt asked to bring pill bottles to next office visit. Please make early appointment if needed and to call office for questions, concerns, persistent, changing or worsening symptoms. Discussed with the patient and answered their questions   Discussed with Dr Abigail Murry.   Josephine DEE, CNP

## 2019-03-15 NOTE — PROGRESS NOTES
1100 [facility] arrived to [facility] [place] via wheelchair for aranesp injection with family member at side  PT [facility].  1118 Discharge instructions given to patient verbalized understanding.  Patient discharged to home in stable condition with family member    ____ Safety:       (Environmental)  Caodaism Pont to environment   Ensure ID band is correct and in place/ allergy band as needed   Assess for fall risk   Initiate fall precautions as applicable (fall band, side rails, etc.)   Call light within reach   Bed in low position/ wheels locked    ____ Pain:        Assess pain level and characteristics   Administer analgesics as ordered   Assess effectiveness of pain management and report to MD as needed    ____ Knowledge Deficit:   Assess baseline knowledge   Provide teaching at level of understanding   Provide teaching via preferred learning method   Evaluate teaching effectiveness    ____ Hemodynamic/Respiratory Status:       (Pre and Post Procedure Monitoring)   Assess/Monitor vital signs and LOC   Assess Baseline SpO2 prior to any sedation   Obtain weight/height   Assess vital signs/ LOC until patient meets discharge criteria   Monitor procedure site and notify MD of any issues

## 2019-05-06 NOTE — TELEPHONE ENCOUNTER
Pt notified that he Hgb is within goal. Advised to get labs drawn prior to her June appt with Dr Salvador Bautista

## 2019-05-09 NOTE — PATIENT INSTRUCTIONS
Patient Education        pegloticase  Pronunciation:  peg RONEL mejia  Brand:  Vivien Bryant  What is the most important information I should know about pegloticase? You should not receive pegloticase if you are allergic to it, or if you have a genetic enzyme deficiency called glucose-6-phosphate dehydrogenase (G6PD) deficiency. Some drugs can interact with pegloticase and should not be used at the same time, especially allopurinol (Zyloprim), probenecid (Benemid), or febuxostat (Uloric). To make sure pegloticase is safe for you, tell your doctor if you have gout, congestive heart failure, other heart problems, or high blood pressure. You may be given other medications to prevent certain side effects of pegloticase. You may need to start taking these medications at least a week before you receive your pegloticase injection. Read the medication guide or patient instructions provided with each medication. Do not change your doses or medication schedule without your doctor's advice. Tell your caregiver right away if you feel itchy, nervous, light-headed, short of breath, or have a fast heartbeat, chest discomfort, or redness of your skin when the medicine is injected into your vein. What is pegloticase? Pegloticase is an enzyme that metabolizes uric acid into a harmless chemical that is eliminated from the body in urine. Pegloticase is used to treat chronic gout. Pegloticase is usually given after other gout medications have been tried without successful treatment of symptoms. Pegloticase may also be used for purposes not listed in this medication guide. What should I discuss with my healthcare provider before taking pegloticase? You should not receive pegloticase if you are allergic to it, or if you have a genetic enzyme deficiency called glucose-6-phosphate dehydrogenase (G6PD) deficiency. Some drugs can interact with pegloticase and should not be used at the same time.  Tell your doctor about all other appointment for your pegloticase injection. What happens if I overdose? Seek emergency medical attention or call the Poison Help line at 1-356.683.2258. What should I avoid while taking pegloticase? Follow your doctor's instructions about any restrictions on food, beverages, or activity. What are the possible side effects of pegloticase? Some people receiving a pegloticase injection have had a reaction to the infusion (when the medicine is injected into the vein). Infusion reactions may also occur after the injection is given. Tell your caregiver right away if you feel itchy, nervous, light-headed, short of breath, or have a fast heartbeat, chest discomfort, or redness of your skin during the injection. Get emergency medical help if you have any of these signs of an allergic reaction: hives; wheezing, difficult breathing; swelling of your face, lips, tongue, or throat. Call your doctor at once if you have a serious side effect such as:  · chest pain; or  · flushing (warmth, redness, or tingly feeling). Less serious side effects may include:  · new gout flares;  · nausea, vomiting, constipation;  · easy bruising; or  · stuffy nose, sore throat. This is not a complete list of side effects and others may occur. Call your doctor for medical advice about side effects. You may report side effects to FDA at 4-577-YOJ-7456. What other drugs will affect pegloticase? There may be other drugs that can interact with pegloticase. Tell your doctor about all medications you use. This includes prescription, over-the-counter, vitamin, and herbal products. Do not start a new medication without telling your doctor. Where can I get more information? Your pharmacist can provide more information about pegloticase. Remember, keep this and all other medicines out of the reach of children, never share your medicines with others, and use this medication only for the indication prescribed.   Every effort has been made to ensure

## 2019-05-09 NOTE — PROGRESS NOTES
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 female with a hx of CKD, chronic CHF, HTN,Hyperlipidemia,  Hypothyroidism,  A. Fib, Renal artery stenosis, T2DM, CKD stage 4, OA b/l knees with bilateral TKR's , Cervical Spinal stenosis (moderate to severe at C6-7),   Here for the f/u evaluation of her Osteoporosis, osteoarthritis both hands, uric acid elevation, inflammatory arthritis. Nephrology decreased the allopurinol to 150mg daily for the past few months. Reports having pain in the bilateral fingers, left wrist, bilaterl elbows (Right > left), Rt shoulder, hips, ankles, toes, neck and back. Most severe in the lower back. Pain on a scale 0-10: 5/10. Type of pain: constant, dull aching pain in the right arm/shoulder. Constant aching pain in the right 5th toe. Lower back-- intermittent localized sharp pain. The other joints are intermittent and daily. Timing:\"all the time\"   Aggravating factors: Rt arm & elbows: movement. Back: prolonged standing. Alleviating factors:Tylenol (moderate relief) , hot shower. Associated symptoms:  AM stiffness lasting ~ 10-15  min. + 2 falls since the last eval w/o injuries. + dry mouth , + dry eyes. + dizziness with x1 wk , worse with turning head. ROS:  Review of Systems   Constitutional: Positive for malaise/fatigue. Negative for fever. HENT: Negative. Negative for congestion, hearing loss and nosebleeds. Eyes: Negative. Negative for blurred vision, pain and redness. Respiratory: Positive for cough and shortness of breath. Cardiovascular: Positive for leg swelling. Negative for chest pain, orthopnea, claudication and PND.    Gastrointestinal: Positive for constipation and diarrhea (occsaionally). Negative for blood in stool, melena, nausea and vomiting. Genitourinary: Negative. Negative for hematuria. Urinary incontinence for at least a few years   Musculoskeletal: Positive for back pain, myalgias and neck pain. Skin: Negative. Negative for rash. Neurological: Positive for tingling (occsaionally in the fingers and feet) and weakness. Negative for dizziness and headaches. Endo/Heme/Allergies: Bruises/bleeds easily. Psychiatric/Behavioral: Negative for depression. The patient is not nervous/anxious and does not have insomnia. PAST MEDICAL HISTORY  Past Medical History:   Diagnosis Date    Anemia     Atrial fib/flutter, transient 2000    Atrial fibrillation ([facility] [address]     [facility]est[facility] failure[facility]     DJD (degenerative joint disease)     DM type 2 (diabetes mellitus, type 2) ([facility])     Gout     HLD (hyperlipidemia)     HTN (hypertension)     Hypothyroidism     Incontinence     Obesity     Pneumonia     Renal artery stenosis ([facility])     Renal insufficiency        SOCIAL HISTORY  Social History     Socioeconomic History    Marital status:       Spouse name: None    Number of children: 3    Years of education: None    Highest education level: None   Occupational History    Occupation: retired   Social Needs    Financial resource strain: None    Food insecurity:     Worry: None     Inability: None    Transportation needs:     Medical: None     Non-medical: None   Tobacco Use    Smoking status: Never Smoker    Smokeless tobacco: Never Used   Substance and Sexual Activity    Alcohol use: No    Drug use: No    Sexual activity: None   Lifestyle    Physical activity:     Days per week: None     Minutes per session: None    Stress: None   Relationships    Social connections:     Talks on phone: None     Gets together: None     Attends Shinto service: None     Active member of club or organization: None     Attends meetings of clubs or organizations: None     Relationship status: None    Intimate partner violence:     Fear of current or ex partner: None     Emotionally abused: None     Physically abused: None     Forced sexual activity: None   Other Topics Concern    None   Social History Narrative    None       FAMILY HISTORY  Family History   Problem Relation Age of Onset    Cancer Mother         breast    Heart Disease Mother         murmur    Arthritis Mother         rheumatism    Arthritis Father         rheumatism    Cancer Brother         jaw    Diabetes Neg Hx     High Blood Pressure Neg Hx     Stroke Neg Hx        SURGICAL HISTORY  Past Surgical History:   Procedure Laterality Date    ACHILLES TENDON SURGERY  3/2004    ANUS SURGERY  2010    BACK SURGERY      BLADDER REPAIR  [id number]    CARDIAC CATHETERIZATION  07/30/2018    CARDIAC SURGERY      heart cath    CATARACT REMOVAL Right 12/2007    CATARACT REMOVAL Left 1/2008    CORONARY ANGIOPLASTY WITH STENT PLACEMENT  2015    LAD, RCA    COSMETIC SURGERY  5/2008    eyelid    EKG 12-LEAD  4/25/2015         ENDOSCOPY, COLON, DIAGNOSTIC      EYE SURGERY      FINGER SURGERY Left 8/2010    left index    FOOT SURGERY  10/2004    HYSTERECTOMY  1978    JOINT REPLACEMENT      both knees    KNEE ARTHROPLASTY      R 2/98, L 10/98    LUMBAR SPINE SURGERY  3/4/05    REFRACTIVE SURGERY  2012    THYROIDECTOMY  1958    UPPER GASTROINTESTINAL ENDOSCOPY  2015    Dr. Pacheco Appl  Allergies   Allergen Reactions    Atarax [Hydroxyzine] Other (See Comments)     Unsure of reaction     Biaxin [Clarithromycin]     Ceclor [Cefaclor] Other (See Comments)     Looney for 2 days     Oxycontin [Oxycodone] Other (See Comments)     dizziness    Tape [Adhesive Tape]      Tears skin    Tylenol With Codeine #3 [Acetaminophen-Codeine]     Ultram [Tramadol] Other (See Comments)     dizziness    Vioxx [Rofecoxib] Other (See Comments)     dizziness    Advil [Ibuprofen] Nausea And Vomiting and Other (See Comments)     Was told never to take    Bumex [Bumetanide] Nausea And Vomiting    Codeine Other (See Comments)     Inability to focus, glassed over eyes patient states    Darvocet A500 [Propoxyphene N-Acetaminophen] Nausea And Vomiting and Other (See Comments)     dizziness    Darvon [Fd&C Red #40-Fd&C Yellow #10-Propoxyphene] Nausea And Vomiting and Other (See Comments)     dizziness       CURRENT MEDICATIONS  Current Outpatient Medications   Medication Sig Dispense Refill    esomeprazole Magnesium (NEXIUM) 20 MG PACK Take 20 mg by mouth daily      amoxicillin-clavulanate (AUGMENTIN) 875-125 MG per tablet TAKE 1 TABLET BY MOUTH EVERY 12 HOURS FOR 10 DAYS  0    allopurinol (ZYLOPRIM) 300 MG tablet Take 0.5 tablets by mouth daily 30 tablet 3    isosorbide mononitrate (IMDUR) 60 MG extended release tablet Take 60 mg by mouth daily      atorvastatin (LIPITOR) 20 MG tablet Take 1 tablet by mouth daily 30 tablet 5    levothyroxine (SYNTHROID) 112 MCG tablet Take 1 tablet by mouth Daily 30 tablet 3    furosemide (LASIX) 40 MG tablet Take 1 tablet by mouth daily 60 tablet 3    acetaminophen (TYLENOL) 500 MG tablet Take 500 mg by mouth every 6 hours as needed for Pain      losartan (COZAAR) 100 MG tablet Take 1 tablet by mouth daily 90 tablet 3    Cholecalciferol (VITAMIN D3) 5000 units CAPS Take 1 capsule by mouth daily (Patient taking differently: Take 5,000 Units by mouth three times a week )      metoprolol succinate (TOPROL XL) 50 MG extended release tablet Take 50 mg by mouth daily      oxybutynin (DITROPAN) 5 MG tablet Take 5 mg by mouth 2 times daily       famotidine (PEPCID) 20 MG tablet Take 20 mg by mouth daily as needed      hydrALAZINE (APRESOLINE) 100 MG tablet Take 1 tablet by mouth 3 times daily 90 tablet 3    LUTEIN-ZEAXANTHIN PO Take 20 mg by mouth nightly       clopidogrel (PLAVIX) 75 MG tablet Take 1 tablet by mouth daily 90 tablet 3    glipiZIDE (GLUCOTROL) 5 MG tablet Take 5 mg by mouth daily      aspirin 81 MG tablet Take 81 mg by mouth daily. No current facility-administered medications for this visit. Objective:  /78 (Site: Left Upper Arm, Position: Sitting, Cuff Size: Medium Adult)   Pulse 63   Ht 5' 2.99\" (1.6 m)   SpO2 97%   BMI 35.79 kg/m²     General: No distress. Alert. Eyes:  No sclera icterus. No conjunctival injection. ENT: No discharge. Pharynx clear. Neck: Trachea midline. Normal thyroid. Resp: No accessory muscle use. No crackles. No wheezing. No rhonchi. No dullness on percussion. CV: Regular rate. Regular rhythm. No mumur or rub. + pitting edema of bilateral lower legs. M/S:   Upper extremities:  Muscle strength 5/5, FROM, No Synovitis   - painful/limited ROM of b/l shoulders  - Rt elbow flexion contracture, tender trochlear notch  - heberden and shayla nodes bilat. .    - swelling of the Right 2-3 MCP. Rt 3-4 PIP   - Tender joint as below. Right elbows. - CMC sqauaring bilat. - inability to make a fist with the Right hand. Lower Extremities:   Muscle strength 5/5, FROM, No Synovitis   Tender outer hips bilat  Knees: medial joint space tenderness  Ankles: medial joint space tenderness   Feet: MTP compression testing bilat. - 5th toe with limited ROM    - hammer deformities of the b/lc 5th toes. And left 2,3 toes. - posterior calcaneal heal spuring. Spine: limited cervical side bending bilat. Tender/hypertonic traps/rhomboid region. Negative spurling   - tenderness of the lower lumbar spine and sacral region. Neuro: DTR's 2/4 bilat and equal  Psych: Oriented to person, place, time. No anxiety or agitation. Skin: Warm and dry. No nodule on exposed extremities. No rash on exposed extremities.   - nail thickening of the toenails   - fluid vescicle/blistering along bilateral lower legs, (+) erytema in the same distribution  - chalky white subq deposits along the Right 2nd toe   Lymph: No cervical LAD. No supraclavicular LAD.       Physical Exam     Tenderness:   RUE: ulnohumeral and radiohumeral  Right hand: 1st MCP, 2nd MCP, 3rd MCP, 4th MCP, 5th MCP, 2nd PIP, 3rd PIP, 4th PIP, 5th PIP, 2nd DIP, 3rd DIP, 4th DIP and 5th DIP  Left hand: 2nd MCP, 3rd MCP, 4th MCP, 5th MCP, 2nd PIP, 3rd PIP, 4th PIP, 5th PIP, 2nd DIP, 3rd DIP, 4th DIP and 5th DIP  Right foot: 1st MTP, 2nd MTP, 3rd MTP, 4th MTP and 5th MTP  Left foot: 1st MTP, 2nd MTP, 3rd MTP, 4th MTP and 5th MTP    Swelling:   RUE: ulnohumeral and radiohumeral  Right hand: 2nd MCP and 5th MCP    YOUNG-28 tender joint count: 18  YOUNG-28 swollen joint count: 3        LABS:  CBC  Lab Results   Component Value Date    WBC 7.4 08/28/2018    RBC 3.14 08/28/2018    RBC 3.48 01/13/2016    HGB 10.6 04/16/2019    HCT 31.0 04/16/2019    MCV 97.5 08/28/2018    MCH 32.2 08/28/2018    MCHC 33.0 08/28/2018    RDW 14.4 02/27/2018     08/28/2018       CMP  Lab Results   Component Value Date    CALCIUM 8.3 03/11/2019    LABALBU 4.1 10/10/2018    PROT 6.5 10/10/2018     03/11/2019    K 4.2 03/11/2019    K 4.5 08/28/2018    CO2 26 03/11/2019     03/11/2019    BUN 54 03/11/2019    CREATININE 2.2 03/11/2019    ALT 5 10/10/2018    AST 14 10/10/2018       HgBA1c: No components found for: HGBA1C    Lab Results   Component Value Date    TSH 0.154 (L) 08/29/2018     Lab Results   Component Value Date    VITD25 43 03/11/2019         Lab Results   Component Value Date    ANASCRN None Detected 08/30/2018     No results found for: SSA  No results found for: SSB  No results found for: ANTI-SMITH  No results found for: DSDNAAB   No results found for: ANTIRNP  No results found for: C3, C4  Lab Results   Component Value Date    CCPAB 8 08/30/2018     Lab Results   Component Value Date    RF <10.0 02/27/2018       No components found for: CANCASCRN, APANCASCRN  Lab Results   Component Value Date    SEDRATE 45 02/27/2018     Lab Results   Component Value Date    CRP 0.26 02/27/2018       RADIOLOGY:       Assessment/ Plan:    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 Uric acid elevation, ESR elevation.  (+) hx gout per pt.    - (+) synovitis in hands and Right elbows. Heberden and shayla nodes with [facility] squaring. Limited ROM of the right shoulder and right hand. (+) circumferential swelling of the right 5th Finger, right 1st toes, and 5th toe. Red rash along the anterior        - Prior tx Allopurinol 300mg qd & 100mg qd (stopped 2017)   - allopurinol at 150mg daily (decreased by nephrology Jan 2019)   - repeat uric acid level   - Rt 2nd toe drainage of chalky white fluids -- sent for crystal analysis   Briefly discussed pegloticase use    Osteopenia:  ELEVATED FRAX  - DEXA consistent with osteopenia but FRAX score warranting treatment given risk of hip fracture was 3.8%. -  The major risk for fracture was 13 %. - Prolia 60mg q 6 months, (started 5/22/18)    - scheduled for 11/26/18       Osteoarthritis bilateral hands   - labs with elevated ESR 45, Uric acid 10.5 mg/dl. - XR changes consistent with inflammatory osteoarthritis given the gull wing deformities. Also noted Chondrocalcinosis of the left wrist.               - avoiding NSAIDs and colchicine given the CKD   -  Mother , sister and daughter with reported similar arthritic hands. Rheumatism. daughter with Psoriasis. - if sx's continue will discuss other treatment options at the next visit. Osteoarthritis bilateral knees:   - s/p bilateral total knee replacement. - stable. Chronic low back pain:   - imaging with DJD, DDD and thoracolumbar scoliosis. - prior tx: physical therapy s/p surgery, epidural injections, Avoiding NSAIDs given CKD, intolerance with several pain medications. Cymbalta would not be recommended given her  GFR. -limited beside opiods for pain relief.      Cervicalgia:   - known hx of spinal stenosis of lower cervical spine on MRI from 2015    - exam with limited side bending bilaterally. Negative spurling   - Previous side effects with multiple pain medications. - Limited additional treatment options    -  evaluation by pain management may be beneficial.     Medication monitoring:    - repeat uric acid --- goal is  less than 5 mg/dL. No follow-ups on file. Electronically signed by Shantanu Donovan DO on 5/9/2019 at 3:56 PM       PROCEDURE NOTE: 5/9/2019     Rt second toe I&D. Indication: Diagnostic evaluation for possible tophi    After cleaning the area of the distal second right toe with Betadine followed by chlorhexidine the area was then sprayed with ethyl chloride as a topical anesthetic. The area was then expressed with a chalky white thick fluid obtained. The fluid was then sent to the lab for crystal evaluation. No significant blood loss was noted. The incision site was bandaged with a Band-Aid.

## 2019-06-04 NOTE — TELEPHONE ENCOUNTER
Chronic Care Management    Mike Saldaña is a 80 y. o.oldfemale Is followed for Chronic Care Management for kidney disease,   Sarabjit Borden's most recent   Lab Results   Component Value Date    CREATININE 3.39 06/03/2019    LABGLOM 20 08/31/2018   . Immunization History   Administered Date(s) Administered    Influenza Virus Vaccine 11/01/2016    PPD Test 01/04/2017    Pneumococcal Vaccine, unspecified formulation 07/29/2010       Diagnostic Data:    BMP:  Lab Results   Component Value Date     06/03/2019    K 4.7 06/03/2019    K 4.5 08/28/2018     06/03/2019    CO2 26 06/03/2019    BUN 87 06/03/2019    CREATININE 3.39 06/03/2019    LABGLOM 20 08/31/2018    GLUCOSE 138 06/03/2019     No results for input(s): WBC, RBC, HGB, HCT, MCV, MCH, RDW, PLT in the last 72 hours. Lab Results   Component Value Date    FERRITIN 74.26 03/11/2019    IRON 77 02/26/2016    LABIRON 31 03/11/2019     No results found for: RETICABS  Lab Results   Component Value Date    HGB 10.6 04/16/2019    HGB 9.4 03/11/2019    HGB 10.1 08/28/2018    HGB 11.7 08/27/2018    HGB 9.5 07/30/2018     Hgb Goal  10-11 g/dl 3/11/19 4/16/19          Hgb  9.4 10.6          Aranesp 40 mcg 3/15/19 -                      Ferritin 74           Iron 77           T sat 31           Vit B 12            Folate            Retic Count                        Injectafer            Venofer            PO iron                             Assessment:    Diagnosis Orders   1. Stage 4 chronic kidney disease ([address]  Noted Creatinine up 3.3 from previous baseline 2.2-3.0    Pt states BP running 129/-142/ last one week. Reports good appetite, feels good  Started on Med for cough but does not know name of type of med. Pt to keep FU appt with Dr Mary Mueller next week. 2. Anemia of chronic disease  At goal in April. Did not include H&H with recent lab draw. Pt wants to wait to see Dr Mary Mueller prior to checking H&H     3.  Iron deficiency anemia due to dietary causes         Keep Follow up Appt with Primary Nephrologist    Andria Egan APRN - CNP APRN

## 2019-06-05 NOTE — TELEPHONE ENCOUNTER
Pt called back and states she had been on some Augmentin and also Bioxan a couple of months ago, but no other new medications. Pt has appt on 6/13/19 to see you. Any changes?

## 2019-06-05 NOTE — TELEPHONE ENCOUNTER
----- Message from Adrien Cline DO sent at 6/4/2019  5:22 PM EDT -----  The labs revealed a worsening kidney function tests compared to last evaluation. The uric acid is no longer at goal of less than 5 mg/dL.   After this acute kidney injury has resolved I would like to increase your allopurinol to your previous dosage or 300mg daily

## 2019-06-05 NOTE — TELEPHONE ENCOUNTER
----- Message from Xochilt Mahajan MD sent at 6/5/2019 12:48 PM EDT -----  Serum creatinine is worse  Any new medications   Old augmentin  Need to see sooner

## 2019-06-06 NOTE — TELEPHONE ENCOUNTER
I called and informed pt of this. She will go to [facility] in [facility] [facility] to get done. Order faxed to them. Pt states she will go on Monday or Tuesday to have done.

## 2019-06-13 NOTE — PROGRESS NOTES
Pt had been on ranitidine and nexium, but these were stopped by PCP due to pt having cough. Pt was started on omeprazole on 5/22/19.

## 2019-06-13 NOTE — PROGRESS NOTES
disease with heart failure (HCC)    Acute on chronic congestive heart failure (HCC)    CKD (chronic kidney disease)    Coronary artery disease involving native coronary artery of native heart without angina pectoris    Acute diastolic congestive heart failure (HCC)    Accelerated hypertension    Pneumonia of both lungs due to infectious organism    Wheezing    Interstitial nephritis, acute    Acidosis    Anemia of chronic kidney failure    Pneumonia of both lower lobes due to infectious organism ([address]    Hypervolemia    Constipation by delayed colonic transit    Metabolic alkalosis    Severe dehydration    Diabetes mellitus with hyperglycemia (HCC)    TURNER (acute kidney injury) ([address]    Anemia of chronic disease    CKD (chronic kidney disease) stage 4, GFR 15-29 ml/min (HCC)    Osteopenia    Vitamin D deficiency    Anemia of chronic disease    Volume overload    Left atrial dilation    SOB (shortness of breath)    Moderate malnutrition (HCC)    Chronic diastolic congestive heart failure (HCC)    Class 1 obesity in adult    Acute respiratory failure with hypoxia (HCC)    Acute renal failure superimposed on stage 3 chronic kidney disease (HCC)    Metabolic acidosis    Lung nodule    Incidental lung nodule, > 3mm and < 8mm    TURNER (acute kidney injury) ([facility])       Subjective:   Chief complaint:  Chief Complaint   Patient presents with    Chronic Kidney Disease     Stage IV      HPI:This is a follow up visit for Mrs. Jesse Enrique who is here today for return appointment. I see her for chronic kidney disease. She was last seen about 3 months ago. Serum creatinine at that time was 2.2 mg/dL. Today it is 3.38 mg/dL. Recently she was placed on omeprazole for nocturnal cough. Before then she was on esomeprazole. Otherwise no any other new medicines. The cough is better according to her. Initially she was also treated with Augmentin for the cough as well.   She forgot to do a urine eosinophils. She will try to do it soon.     ROS:Constitutional: negative  Eyes: negative  Ears, nose, mouth, throat, and face: negative  Respiratory: negative  Cardiovascular: negative  Gastrointestinal: negative  Genitourinary:negative  Integument/breast: negative  Hematologic/lymphatic: negative  Musculoskeletal:positive for arthralgias and stiff joints  Neurological: positive for coordination problems and gait problems  Behavioral/Psych: negative  Endocrine: negative  Allergic/Immunologic: negative  Medications:     Current Outpatient Medications   Medication Sig Dispense Refill    omeprazole (PRILOSEC) 40 MG delayed release capsule Take 40 mg by mouth daily      allopurinol (ZYLOPRIM) 300 MG tablet Take 0.5 tablets by mouth daily (Patient taking differently: Take 300 mg by mouth daily ) 30 tablet 3    isosorbide mononitrate (IMDUR) 60 MG extended release tablet Take 60 mg by mouth daily      atorvastatin (LIPITOR) 20 MG tablet Take 1 tablet by mouth daily 30 tablet 5    levothyroxine (SYNTHROID) 112 MCG tablet Take 1 tablet by mouth Daily 30 tablet 3    furosemide (LASIX) 40 MG tablet Take 1 tablet by mouth daily 60 tablet 3    acetaminophen (TYLENOL) 500 MG tablet Take 500 mg by mouth every 6 hours as needed for Pain      losartan (COZAAR) 100 MG tablet Take 1 tablet by mouth daily 90 tablet 3    Cholecalciferol (VITAMIN D3) 5000 units CAPS Take 1 capsule by mouth daily (Patient taking differently: Take 5,000 Units by mouth three times a week )      metoprolol succinate (TOPROL XL) 50 MG extended release tablet Take 50 mg by mouth daily      oxybutynin (DITROPAN) 5 MG tablet Take 5 mg by mouth 2 times daily       famotidine (PEPCID) 20 MG tablet Take 20 mg by mouth daily       hydrALAZINE (APRESOLINE) 100 MG tablet Take 1 tablet by mouth 3 times daily 90 tablet 3    LUTEIN-ZEAXANTHIN PO Take 20 mg by mouth nightly       clopidogrel (PLAVIX) 75 MG tablet Take 1 tablet by mouth daily 90 tablet 3  glipiZIDE (GLUCOTROL) 5 MG tablet Take 5 mg by mouth daily      aspirin 81 MG tablet Take 81 mg by mouth daily. No current facility-administered medications for this visit.         Lab Results:    CBC:   Lab Results   Component Value Date    WBC 7.4 08/28/2018    HGB 10.3 (L) 06/03/2019    HCT 29.8 (L) 06/03/2019    MCV 97.5 08/28/2018     08/28/2018     BMP:    Lab Results   Component Value Date     06/03/2019     06/03/2019     03/11/2019    K 4.7 06/03/2019    K 5.0 06/03/2019    K 4.2 03/11/2019     06/03/2019     06/03/2019     03/11/2019    CO2 26 06/03/2019    CO2 27 06/03/2019    CO2 26 03/11/2019    BUN 87 (H) 06/03/2019    BUN 86 (H) 06/03/2019    BUN 54 (H) 03/11/2019    CREATININE 3.39 (H) 06/03/2019    CREATININE 3.38 (H) 06/03/2019    CREATININE 2.2 (H) 03/11/2019    GLUCOSE 138 (H) 06/03/2019    GLUCOSE 141 (H) 06/03/2019    GLUCOSE 195 (H) 03/11/2019      Hepatic:   Lab Results   Component Value Date    AST 19 06/03/2019    AST 14 10/10/2018    AST 14 09/11/2018    ALT 6 06/03/2019    ALT 5 10/10/2018    ALT 7 09/11/2018    BILITOT 0.7 06/03/2019    BILITOT 0.6 10/10/2018    BILITOT 0.6 09/11/2018    ALKPHOS 65 06/03/2019    ALKPHOS 62 10/10/2018    ALKPHOS 61 09/11/2018     BNP: No results found for: BNP  Lipids:   Lab Results   Component Value Date    CHOL 179 10/10/2018    HDL 38.4 (L) 10/10/2018     INR:   Lab Results   Component Value Date    INR 1.02 08/28/2018    INR 0.98 07/30/2018    INR 0.94 12/23/2016     URINE:   Lab Results   Component Value Date    NAUR 25 12/28/2016     Lab Results   Component Value Date    NITRU NEGATIVE 08/28/2018    COLORU YELLOW 08/28/2018    PHUR 5.0 08/28/2018    WBCUA 0-2 08/28/2018    RBCUA NONE SEEN 08/28/2018    YEAST NONE SEEN 08/28/2018    BACTERIA NONE 08/28/2018    LEUKOCYTESUR NEGATIVE 08/28/2018    UROBILINOGEN 0.2 08/28/2018    BILIRUBINUR NEGATIVE 08/28/2018    BLOODU NEGATIVE 08/28/2018    GLUCOSEU NEGATIVE 08/28/2018    KETUA NEGATIVE 08/28/2018      Microalbumen/Creatinine ratio:  No components found for: RUCREAT    Objective:   Vitals: /68 (Site: Right Upper Arm, Position: Sitting, Cuff Size: Medium Adult)   Pulse 56   Wt 192 lb 3.2 oz (87.2 kg)   SpO2 97%   BMI 34.06 kg/m²      Constitutional:  Alert, awake, no apparent distress  Skin:normal  HEENT:Pupils are reactive . Throat is clear  Neck:supple with no thyromegally  Cardiovascular:  S1, S2 without murmur  Respiratory:  Clear  Abdomen: +bs, soft, non tender  Ext: No LE edema  Musculoskeletal:Intact  Neuro:Alert and oriented with no deficit    Electronically signed by Kely Varghese MD on 6/13/2019 at 3:26 PM

## 2019-06-17 NOTE — TELEPHONE ENCOUNTER
Pt called and states her omeprazole is a 40 mg capsule that she cannot cut in half. Do you want to prescribe 20 mg tablet?

## 2019-06-17 NOTE — TELEPHONE ENCOUNTER
I called pt and informed her of this. She will take them every other day for now. If this does not work, she will call us back.

## 2019-07-24 NOTE — TELEPHONE ENCOUNTER
Pls ask pt to get H&H, iron studies and labs to check for immunity against Hep B.  Can be drawn same time as her labs from Dr Loulou Jenkins in Aug.

## 2019-08-02 NOTE — TELEPHONE ENCOUNTER
I called and informed pt of this. She states she will go either today or tomorrow since her daughter has to work at 3 today.

## 2019-08-02 NOTE — ED PROVIDER NOTES
[address] [facility] COMPLAINT       Chief Complaint   Patient presents with    Dehydration       Nurses Notes reviewed and I agree except as noted in the HPI. HISTORY OF PRESENT ILLNESS    Pro Moser is a 80 y.o. female with a past medical history of atrial fibrillation, CHF, DJD, DM, HTN, HLD, and renal insufficiency. The patient has a 2-3 week history of diarrhea that has been persistent since onset. The patient states that her stools have been yellow in color. She denies any black or bloody stools. She experiences some abdominal cramping prior to having a bowel movement but otherwise has not had any abdominal pain. The patient did see her nephrologist, Dr. Nya London, on 7/18 after having the diarrhea for a week, he took her off her diuretic and told her to increase her fluid intake to 2L per day. Patient states that she has not been able to drink that much water, but has been drinking about 40oz per day. She was advised to come to the ED is she became concerned for dehydration. The patient states that this morning she experienced an episode of vomiting after eating chicken broth this morning, the vomiting is a new symptom. No fevers or chills. The patient has a chronic cough. she denies any recent foreign travel, antibiotic use, or unusual food consumption. No additional complaints or concerns at the time of initial evaluation. REVIEW OF SYSTEMS     Review of Systems   Constitutional: Negative for appetite change, chills, fatigue and fever. HENT: Negative for congestion, ear pain, rhinorrhea and sore throat. Eyes: Negative for pain, discharge and visual disturbance. Respiratory: Negative for cough, shortness of breath and wheezing. Cardiovascular: Negative for chest pain, palpitations and leg swelling. Gastrointestinal: Positive for abdominal pain (cramping prior to BM), diarrhea and vomiting. Negative for nausea. daily    CLOPIDOGREL (PLAVIX) 75 MG TABLET    Take 1 tablet by mouth daily    GLIPIZIDE (GLUCOTROL) 5 MG TABLET    Take 5 mg by mouth daily    HYDRALAZINE (APRESOLINE) 100 MG TABLET    Take 1 tablet by mouth 3 times daily    ISOSORBIDE MONONITRATE (IMDUR) 60 MG EXTENDED RELEASE TABLET    Take 60 mg by mouth daily    LEVOTHYROXINE (SYNTHROID) 112 MCG TABLET    Take 1 tablet by mouth Daily    LOSARTAN (COZAAR) 100 MG TABLET    Take 1 tablet by mouth daily    LUTEIN-ZEAXANTHIN PO    Take 20 mg by mouth nightly     METOPROLOL SUCCINATE (TOPROL XL) 50 MG EXTENDED RELEASE TABLET    Take 50 mg by mouth daily    OMEPRAZOLE (PRILOSEC) 40 MG DELAYED RELEASE CAPSULE    Take 40 mg by mouth every other day     OXYBUTYNIN (DITROPAN) 5 MG TABLET    Take 5 mg by mouth 2 times daily        ALLERGIES     is allergic to atarax [hydroxyzine]; biaxin [clarithromycin]; ceclor [cefaclor]; minoxidil; oxycontin [oxycodone]; tape [adhesive tape]; tylenol with codeine #3 [acetaminophen-codeine]; ultram [tramadol]; vioxx [rofecoxib]; advil [ibuprofen]; bumex [bumetanide]; codeine; darvocet a500 [propoxyphene n-acetaminophen]; and darvon [fd&c red #40-fd&c yellow #10-propoxyphene]. FAMILY HISTORY     She indicated that her mother is . She indicated that her father is . She indicated that the status of her brother is unknown. She indicated that the status of her neg hx is unknown.   family history includes Arthritis in her father and mother; Cancer in her brother and mother; Heart Disease in her mother. SOCIAL HISTORY      reports that she has never smoked. She has never used smokeless tobacco. She reports that she does not drink alcohol or use drugs. PHYSICAL EXAM     INITIAL VITALS:  height is 5' 3\" (1.6 m) and weight is 183 lb (83 kg). Her oral temperature is 98.5 °F (36.9 °C). Her blood pressure is 128/51 (abnormal) and her pulse is 82. Her respiration is 18 and oxygen saturation is 94%.       CONSTITUTIONAL: Rendell Saint, her diarrhea is viral or functional she will need to follow up with her PCP and possibly GI for further management if it does not resolve. Patient amenable with plan of care at this time. Patient care turned over to Dr. Bill Almanzar, to await GI panel results for dispo. CRITICAL CARE:   None    CONSULTS:  None    PROCEDURES:  None    FINAL IMPRESSION    No diagnosis found. 1. Diarrhea    2. Chronic kidney disease  DISPOSITION/PLAN   Pending at time of sign out    PATIENT REFERRED TO:  No follow-up provider specified. DISCHARGE MEDICATIONS:  New Prescriptions    No medications on file       (Please note that portions ofthis note were completed with a voice recognition program.  Efforts were made to edit the dictations but occasionally words are mis-transcribed.)    Scribe:  Zeinab La 8/2/19 2:59 PM Scribing for and in the presence of Izabella Lopez MD.    Signed by: Andrews García, 08/02/19 6:53 PM    Provider:  I personally performed the services described in the documentation, reviewed and edited the documentation which was dictated to the scribe in my presence, and it accurately records my words and actions.     Izabella Lopez MD 8/2/19 6:53 PM                  Izabella Lopez MD  08/03/19 [id number]

## 2019-08-02 NOTE — TELEPHONE ENCOUNTER
Daughter called and states pt is still having diarrhea. She now is feeling cold. She has only taken the furosemide twice since 7/18/19. Pt is taking imodium, but it does not seem to be helping with the diarrhea. What do you want to do?

## 2019-08-03 NOTE — ED PROVIDER NOTES
%    Lymphocytes 10.8 %    Monocytes 11.3 %    Eosinophils 3.5 %    Basophils 0.5 %    Immature Granulocytes 0.4 %    Segs Absolute 4.2 1.8 - 7.7 thou/mm3    Lymphocytes # 0.6 (L) 1.0 - 4.8 thou/mm3    Monocytes # 0.6 0.4 - 1.3 thou/mm3    Eosinophils # 0.2 0.0 - 0.4 thou/mm3    Basophils # 0.0 0.0 - 0.1 thou/mm3    Immature Grans (Abs) 0.02 0.00 - 0.07 thou/mm3    nRBC 0 /100 wbc   Basic Metabolic Panel   Result Value Ref Range    Sodium 137 135 - 145 meq/L    Potassium 4.4 3.5 - 5.2 meq/L    Chloride 102 98 - 111 meq/L    CO2 20 (L) 23 - 33 meq/L    Glucose 152 (H) 70 - 108 mg/dL    BUN 79 (H) 7 - 22 mg/dL    CREATININE 3.2 (HH) 0.4 - 1.2 mg/dL    Calcium 10.2 8.5 - 10.5 mg/dL   APTT   Result Value Ref Range    aPTT 30.6 22.0 - 38.0 seconds   Protime-INR   Result Value Ref Range    INR 0.89 0.85 - 1.13   Hepatic Function Panel   Result Value Ref Range    Alb 4.0 3.5 - 5.1 g/dL    Total Bilirubin 0.7 0.3 - 1.2 mg/dL    Bilirubin, Direct <0.2 0.0 - 0.3 mg/dL    Alkaline Phosphatase 61 38 - 126 U/L    AST 17 5 - 40 U/L    ALT 6 (L) 11 - 66 U/L    Total Protein 6.7 6.1 - 8.0 g/dL   Lipase   Result Value Ref Range    Lipase 43.6 5.6 - 51.3 U/L   Troponin   Result Value Ref Range    Troponin T 0.074 (A) ng/ml   Brain Natriuretic Peptide   Result Value Ref Range    Pro-.0 0.0 - 1800.0 pg/mL   Lactic Acid, Plasma   Result Value Ref Range    Lactic Acid 1.0 0.5 - 2.2 mmol/L   Anion Gap   Result Value Ref Range    Anion Gap 15.0 8.0 - 16.0 meq/L   Glomerular Filtration Rate, Estimated   Result Value Ref Range    Est, Glom Filt Rate 14 (A) ml/min/1.73m2   Osmolality   Result Value Ref Range    Osmolality Calc 300.5 (H) 275.0 - 300 mOsmol/kg   EKG 12 Lead   Result Value Ref Range    Ventricular Rate 70 BPM    Atrial Rate 70 BPM    QRS Duration 138 ms    Q-T Interval 418 ms    QTc Calculation (Bazett) 451 ms    R Axis -61 degrees    T Axis 77 degrees       IMAGING STUDIES    CT ABDOMEN PELVIS WO CONTRAST Additional

## 2019-08-05 PROBLEM — D50.8 IRON DEFICIENCY ANEMIA DUE TO DIETARY CAUSES: Chronic | Status: ACTIVE | Noted: 2019-01-01

## 2019-08-08 NOTE — PROGRESS NOTES
Chief Complaint   Patient presents with    6 Month Follow-Up    Coronary Artery Disease    Shortness of Breath   Originally Patient here for a hospital follow up  [address]:  Elevated troponin in an 80year-old female  patient presented with cough, nausea, vomiting and admitted for dehydration  and nausea and vomiting.   Patient with past medical history of CAD, PCI to LAD, PCI to RCA, HTN, Dyslipidemia       Pt here for a 6 month f/u    Denied chest pain or dizziness or edema     Sob on exertion chronic    Occasional palpitations    Leg edema +1 and off Lasix 40 for 2 week back and to see nephrology  No wt gain    recordes reviewed      Patient Active Problem List   Diagnosis    Prerenal acute renal failure ([address]    Essential hypertension    Atrial fib/flutter, transient    DJD (degenerative joint disease)    Renal artery stenosis ([address]    HLD (hyperlipidemia)    DM type 2 (diabetes mellitus, type 2) ([address]    Anemia    CKD (chronic kidney disease) stage 3, GFR 30-59 ml/min ([facility])    Coronary artery disease without angina pectoris    Headache    Abnormal EKG    Cardiovascular risk factor    Angina, class II ([address]    DM (diabetes mellitus) ([address]    Dyslipidemia    S/P angioplasty with stent    Presence of stent in left circumflex coronary artery    Presence of stent in right coronary artery    Type 2 diabetes mellitus with complication ([address]    Renovascular hypertension    Hypertensive crisis    TURNER (acute kidney injury) ([address]    DVT (deep venous thrombosis) ([facility])    Urge incontinence of urine    Hypertensive emergency    Hypertensive heart disease with heart failure ([address]    Acute on chronic congestive heart failure (HCC)    CKD (chronic kidney disease)    Coronary artery disease involving native coronary artery of native heart without angina pectoris    Acute diastolic congestive heart failure (HCC)    Accelerated hypertension    Pneumonia of both lungs due to

## 2019-08-12 NOTE — PROGRESS NOTES
melena, nausea and vomiting. Genitourinary: Negative. Negative for hematuria. Urinary incontinence for at least a few years   Musculoskeletal: Positive for back pain, myalgias and neck pain. Skin: Negative. Negative for rash. Neurological: Positive for tingling (occsaionally in the fingers and feet) and weakness. Negative for dizziness and headaches. Endo/Heme/Allergies: Bruises/bleeds easily. Psychiatric/Behavioral: Negative for depression. The patient is not nervous/anxious and does not have insomnia. PAST MEDICAL HISTORY  Past Medical History:   Diagnosis Date    Anemia     Atrial fib/flutter, transient 2000    Atrial fibrillation ([address]     CHF [facility]congestive [facility]) [facility]     DJD (degenerative joint disease)     DM type 2 (diabetes mellitus, type 2) ([facility]     Gout     HLD (hyperlipidemia)     HTN (hypertension)     Hypothyroidism     Incontinence     Obesity     Pneumonia     Renal artery stenosis ([facility]     Renal insufficiency        SOCIAL HISTORY  Social History     Socioeconomic History    Marital status:       Spouse name: None    Number of children: 3    Years of education: None    Highest education level: None   Occupational History    Occupation: retired   Social Needs    Financial resource strain: None    Food insecurity:     Worry: None     Inability: None    Transportation needs:     Medical: None     Non-medical: None   Tobacco Use    Smoking status: Never Smoker    Smokeless tobacco: Never Used   Substance and Sexual Activity    Alcohol use: No    Drug use: No    Sexual activity: None   Lifestyle    Physical activity:     Days per week: None     Minutes per session: None    Stress: None   Relationships    Social connections:     Talks on phone: None     Gets together: None     Attends [employer] service: None     Active member of club or organization: None     Attends meetings of clubs or organizations: None     Relationship status: Value Date    CALCIUM 10.2 08/02/2019    LABALBU 4.0 08/02/2019    PROT 6.7 08/02/2019     08/02/2019    K 4.4 08/02/2019    K 4.5 08/28/2018    CO2 20 08/02/2019     08/02/2019    BUN 79 08/02/2019    CREATININE 3.2 08/02/2019    ALT 6 08/02/2019    AST 17 08/02/2019       HgBA1c: No components found for: HGBA1C    Lab Results   Component Value Date    TSH 0.154 (L) 08/29/2018     Lab Results   Component Value Date    VITD25 39 06/03/2019         Lab Results   Component Value Date    ANASCRN None Detected 08/30/2018     No results found for: SSA  No results found for: SSB  No results found for: ANTI-SMITH  No results found for: DSDNAAB   No results found for: ANTIRNP  No results found for: C3, C4  Lab Results   Component Value Date    CCPAB 8 08/30/2018     Lab Results   Component Value Date    RF <10.0 02/27/2018       No components found for: CANCASCRN, APANCASCRN  Lab Results   Component Value Date    SEDRATE 45 02/27/2018     Lab Results   Component Value Date    CRP 0.26 02/27/2018       RADIOLOGY:       Assessment/ Plan:    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 Uric acid elevation, ESR elevation.  (+) hx gout per pt.    - (+) synovitis in hands and Right elbows. Heberden and shayla nodes with Aia 16 squaring. Limited ROM of the right shoulder and right hand. (+) circumferential swelling of the right 5th Finger, right 1st toes, and 5th toe. Red rash along the anterior        - Prior tx Allopurinol 300mg qd & 100mg qd (stopped 2017)   -- allopurinol 300mg daily    - repeat uric acid level   - briefly discussed pegloticase use    Osteopenia:  ELEVATED FRAX  - DEXA consistent with osteopenia but FRAX score warranting treatment given risk of hip fracture was 3.8%. -  The major risk for fracture was 13 %.     - Prolia 60mg q 6 months, (started 5/22/18, Nov. 2018)    - repeat Prolia -- discussed risk of hypocalcemia with the prolia -- discussed worsening bone desnity with d/c of therapy w/o follow up therapy.        Osteoarthritis bilateral hands   - labs with elevated ESR 45, Uric acid 10.5 mg/dl. - XR changes consistent with inflammatory osteoarthritis given the gull wing deformities. Also noted Chondrocalcinosis of the left wrist.               - avoiding NSAIDs and colchicine given the CKD      Osteoarthritis bilateral knees:   - s/p bilateral total knee replacement. - stable. Chronic low back pain:   - imaging with DJD, DDD and thoracolumbar scoliosis. - prior tx: physical therapy s/p surgery, epidural injections, Avoiding NSAIDs given CKD, intolerance with several pain medications. Cymbalta would not be recommended given her  GFR. -limited beside opiods for pain relief. Medication monitoring:    - repeat uric acid ordered--- goal is  less than 5 mg/dL. No follow-ups on file. Electronically signed by Fermin Diaz DO on 8/12/2019 at 2:45 PM       PROCEDURE NOTE: 8/12/2019     Rt second toe I&D. Indication: Diagnostic evaluation for possible tophi    After cleaning the area of the distal second right toe with Betadine followed by chlorhexidine the area was then sprayed with ethyl chloride as a topical anesthetic. The area was then expressed with a chalky white thick fluid obtained. The fluid was then sent to the lab for crystal evaluation. No significant blood loss was noted. The incision site was bandaged with a Band-Aid.

## 2019-10-17 PROBLEM — D63.8 ANEMIA, CHRONIC DISEASE: Chronic | Status: ACTIVE | Noted: 2019-01-01

## 2019-10-17 PROBLEM — N18.4 CKD (CHRONIC KIDNEY DISEASE), STAGE IV (HCC): Chronic | Status: ACTIVE | Noted: 2019-01-01

## 2019-10-18 PROBLEM — R52 UNCONTROLLED PAIN: Status: ACTIVE | Noted: 2019-01-01

## 2019-10-19 PROBLEM — K59.00 CONSTIPATION: Status: ACTIVE | Noted: 2017-01-19

## 2019-10-20 PROBLEM — M51.16 LUMBAR DISC HERNIATION WITH RADICULOPATHY: Status: ACTIVE | Noted: 2019-01-01

## 2019-12-16 LAB — AFB CULTURE & SMEAR: NORMAL

## 2019-12-23 LAB — AFB CULTURE & SMEAR: NORMAL

## (undated) DEVICE — KIT INF CTRL 2OZ LUB TBNG L12FT DBL END BRSH SYR OP4

## (undated) DEVICE — FORCEPS BX L100CM DIA1.8MM WRK CHN 2MM PULM S STL RAD JAW 4

## (undated) DEVICE — MEDI-VAC NON-CONDUCTIVE SUCTION TUBING 6MM X 6.1M (20 FT.) L: Brand: CARDINAL HEALTH

## (undated) DEVICE — MEDI-VAC NON-CONDUCTIVE SUCTION TUBING 6MM X 1.8M (6FT.) L: Brand: CARDINAL HEALTH

## (undated) DEVICE — BRUSH CYTO L140CM DIA1.5MM WRK CHN 2MM BRIST SHTH RNG HNDL

## (undated) DEVICE — CONMED DISPOSABLE MICROBIOLOGY BRUSH, Ø1 MM, 1.8 MM WORKING DIAMETER, 110 CM LENGTH: Brand: CONMED

## (undated) DEVICE — YANKAUER,BULB TIP,W/O VENT,RIGID,STERILE: Brand: MEDLINE

## (undated) DEVICE — CO2 CANNULA,SUPERSOFT, ADLT,7'O2,7'CO2: Brand: MEDLINE

## (undated) DEVICE — SOLUTION IV 1000ML 0.45% SOD CHL PH 5 INJ USP VIAFLX PLAS

## (undated) DEVICE — TUBING, SUCTION, 1/4" X 20', STRAIGHT: Brand: MEDLINE INDUSTRIES, INC.

## (undated) DEVICE — TRAP SPEC COLL 40CC MUCOUS CALIB UNIV CONN FOR OPN SUCT

## (undated) DEVICE — CONTAINER,SPECIMEN,PNEU TUBE,4OZ,OR STRL: Brand: MEDLINE

## (undated) DEVICE — TUBING, SUCTION, 1/4" X 6', STRAIGHT: Brand: MEDLINE

## (undated) DEVICE — MASK O2 AD TB L7FT HI CONC PART N RBRTH W RESVR BG SFTY

## (undated) DEVICE — TRAP,MUCUS SPECIMEN, 80CC: Brand: MEDLINE

## (undated) DEVICE — TUBING IV STOPCOCK 48 CM 3 W

## (undated) DEVICE — SYRINGE, 10ML SALINE IN 10ML: Brand: MEDLINE

## (undated) DEVICE — RESUSCITATOR AD MNL O2 RESVR AD MASK

## (undated) DEVICE — SET LNR RED GRN W/ BASE CLEANASCOPE

## (undated) DEVICE — SINGLE USE SUCTION VALVE MAJ-209: Brand: SINGLE USE SUCTION VALVE (STERILE)

## (undated) DEVICE — SINGLE USE BIOPSY VALVE MAJ-210: Brand: SINGLE USE BIOPSY VALVE (STERILE)

## (undated) DEVICE — SET ADMIN 25ML L117IN PMP MOD CK VLV RLER CLMP 2 SMRTSITE

## (undated) DEVICE — SOLUTION IV IRRIG POUR BRL 0.9% SODIUM CHL 2F7124